# Patient Record
Sex: MALE | Race: WHITE | NOT HISPANIC OR LATINO | Employment: OTHER | URBAN - METROPOLITAN AREA
[De-identification: names, ages, dates, MRNs, and addresses within clinical notes are randomized per-mention and may not be internally consistent; named-entity substitution may affect disease eponyms.]

---

## 2018-07-02 RX ORDER — IBUPROFEN 200 MG
200 TABLET ORAL DAILY PRN
COMMUNITY
Start: 2013-03-25 | End: 2019-01-03 | Stop reason: ALTCHOICE

## 2018-07-02 RX ORDER — TAMSULOSIN HYDROCHLORIDE 0.4 MG/1
0.4 CAPSULE ORAL
COMMUNITY
Start: 2017-07-11 | End: 2019-12-20 | Stop reason: HOSPADM

## 2018-07-02 RX ORDER — FINASTERIDE 5 MG/1
TABLET, FILM COATED ORAL
COMMUNITY
Start: 2017-12-21 | End: 2020-11-05 | Stop reason: ALTCHOICE

## 2018-07-02 RX ORDER — LEVOTHYROXINE SODIUM 0.1 MG/1
TABLET ORAL
COMMUNITY
Start: 2018-04-24

## 2018-07-03 ENCOUNTER — OFFICE VISIT (OUTPATIENT)
Dept: CARDIOLOGY CLINIC | Facility: CLINIC | Age: 63
End: 2018-07-03
Payer: COMMERCIAL

## 2018-07-03 VITALS
BODY MASS INDEX: 29.32 KG/M2 | HEART RATE: 70 BPM | DIASTOLIC BLOOD PRESSURE: 80 MMHG | HEIGHT: 72 IN | WEIGHT: 216.5 LBS | SYSTOLIC BLOOD PRESSURE: 124 MMHG

## 2018-07-03 DIAGNOSIS — I44.0 FIRST DEGREE AV BLOCK: ICD-10-CM

## 2018-07-03 DIAGNOSIS — I49.3 PVC (PREMATURE VENTRICULAR CONTRACTION): Primary | ICD-10-CM

## 2018-07-03 PROCEDURE — 99245 OFF/OP CONSLTJ NEW/EST HI 55: CPT | Performed by: INTERNAL MEDICINE

## 2018-07-03 PROCEDURE — 93000 ELECTROCARDIOGRAM COMPLETE: CPT | Performed by: INTERNAL MEDICINE

## 2018-07-03 RX ORDER — ATENOLOL 25 MG/1
25 TABLET ORAL DAILY
Qty: 30 TABLET | Refills: 3 | Status: SHIPPED | OUTPATIENT
Start: 2018-07-03 | End: 2018-08-24 | Stop reason: SDUPTHER

## 2018-07-03 NOTE — PROGRESS NOTES
[facility] [address]    Outpatient New Consult Today's Date: 18        Patient name: Kelsie Servin  YOB: 1955  Sex: male         Chief Complaint: Referreal from Dr Whitney Grayson for PVC      ASSESSMENT:  Problem List Items Addressed This Visit     None      Visit Diagnoses     PVC (premature ventricular contraction)    -  Primary    Relevant Medications    atenolol (TENORMIN) 25 mg tablet    Other Relevant Orders    POCT ECG    Echo complete with contrast if indicated    Stress test only, exercise    MRI cardiac  w wo contrast    TSH, 3rd generation with T4 reflex    First degree AV block        Relevant Medications    atenolol (TENORMIN) 25 mg tablet    Other Relevant Orders    Echo complete with contrast if indicated    Stress test only, exercise    MRI cardiac  w wo contrast    TSH, 3rd generation with T4 reflex            64 yo male  1) PVC- frequent he had  20 5% of his beats (21,187) PVC, 297 couplets, 1 triplet, PVC on 12 lead is left bundle transition in V5, notched pos inf leads, pos lead I, this is from RVOT free wall  He believes he had PVC as early as 25s, but recently symptomatic for past month, has has had palpitations, feeling irregulary heart beat, dizzyness (at night going to bathroom) and  Intermittent SOB w exercise at gym  He is very fit, works out 3 times a week w intensity, and climbs mountains  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 and noted PVC's and bradycardia, first degree av block  2) First degree av block per patient this is new  3) 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, actually had 2 years of doxy, tick bite in [facility] w rash and arthritis, "foggyness" palpitatons, 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  4) Grandfather  45s, suddenly, "heart attack" after biking on camping trip    PLAN:  1   Recommend cardiac MRI r/o ARVD/Sarcoiddosis especially given FMHx of SCD, RVOT free wall is not always benign morphology pvc and can be associated w arvd, and first degree av block associated w sarcoid  2  Echo r/o cardiomypathy, assess valves (prefer this over mri in our center than MRI alone for valves and EF)  3  Exercise treadmill test see if PVC worse w/ exercise and r/o CAD (low risk for CAD given exercise tolerance, lipid profile (, HDL 58, TC <200) and  PVC location RVOT  4  TSH since on synthroid  5  emeric trial atenelol, if feels worse will stop it  If feels better continue  6  May consider PVC ablation if betablocker doesn't work  We went over risks and benefits in detail      Follow up in: 6 weeks    Orders Placed This Encounter   Procedures    MRI cardiac  w wo contrast    TSH, 3rd generation with T4 reflex    Stress test only, exercise    POCT ECG    Echo complete with contrast if indicated     There are no discontinued medications    HPI/Subjective:   64 yo male  1) PVC- frequent he had  20 5% of his beats (21,187) PVC, 297 couplets, 1 triplet, PVC on 12 lead is left bundle transition in V5, notched pos inf leads, pos lead I, this is from RVOT free wall  He believes he had PVC as early as 25s, but recently symptomatic for past month, has has had palpitations, feeling irregulary heart beat, dizzyness (at night going to bathroom) and  Intermittent SOB w exercise at gym  He is very fit, works out 3 times a week w intensity, and climbs mountains   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 and noted PVC's and bradycardia, first degree av block  2) First degree av block per patient this is new  3) 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, actually had 2 years of doxy, tick bite in [facility] w rash and arthritis, "foggyness" palpitatons, 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  4) Grandfather  45s, suddenly, "heart attack" after biking on camping trip  Please note HPI is listed by problem with with update following it, it is copied again in the assessment above and reflects medical decision making as well  Complete 12 point ROS reviewed and otherwise non pertinent or negative except as per HPI pertinent positives in Cardiovascular and Respiratory emphasized  Please see paper chart for outpatient clinic patients where the patient completed the 12 point ROS survey  No past medical history on file  Allergies   Allergen Reactions    Hydromorphone Hallucinations     Patient saw things that were not there     Medical Tape      Itches      I reviewed the Home Medication list and Allergies in the chart  Scheduled Meds:  Current Outpatient Prescriptions   Medication Sig Dispense Refill    finasteride (PROSCAR) 5 mg tablet TAKE 1 TABLET BY MOUTH DAILY      ibuprofen (MOTRIN) 200 mg tablet Take 200 mg by mouth      levothyroxine 100 mcg tablet TAKE 1 TABLET BY MOUTH DAILY      Multiple Vitamins-Minerals (MULTI COMPLETE PO) one dialy      tamsulosin (FLOMAX) 0 4 mg Take 0 4 mg by mouth      aspirin 81 MG tablet Take 81 mg by mouth      atenolol (TENORMIN) 25 mg tablet Take 1 tablet (25 mg total) by mouth daily 30 tablet 3     No current facility-administered medications for this visit  PRN Meds:  No family history on file  Social History     Social History    Marital status: Unknown     Spouse name: N/A    Number of children: N/A    Years of education: N/A     Occupational History    Not on file       Social History Main Topics    Smoking status: Not on file    Smokeless tobacco: Not on file    Alcohol use Not on file    Drug use: Unknown    Sexual activity: Not on file     Other Topics Concern    Not on file     Social History Narrative    No narrative on file         OBJECTIVE:    /80 (BP Location: Left arm, Patient Position: Sitting, Cuff Size: Large)   Pulse 70   Ht 6' (1 829 m)   Wt 98 2 kg (216 lb 8 oz)   BMI 29 36 kg/m²   Vitals:    07/03/18 0753   Weight: 98 2 kg (216 lb 8 oz)     GEN: No acute distress, Alert and oriented, well appearing  HEENT:Head, neck, ears, oral pharynx: Mucus membranes moist, oral pharynx clear, nares clear  External ears normal  EYES: Pupils equal, sclera anicteric, midline, normal conjuctiva  NECK: No JVD, supple, no obvious masses or thryomegaly or goiter  CARDIOVASCULAR:  RRR, No murmur, rub, gallops S1,S2  LUNGS: Clear To auscultation bilaterally, normal effort, no rales, rhonchi, crackles  ABDOMEN:  nondistended,  without obvious organomegaly or ascites  EXTREMITIES/VASCULAR:  No edema  Radial pulses intact, pedal pulses difficult to palpate, warm an well perfused  PSYCH: Normal Affect, no overt suicidal ideation, linear speech pattern without evidence of psychosis  NEURO: Grossly intact, moving all extremiteis equal, face symmetric, alert and responsive, no obvious focal defecits  GAIT:  Ambulates normally without difficulty  HEME: No bleeding, bruising, petechia, purpura  SKIN: No significant rashes, warm, no diaphoresis or pallor  Lab Results:       LABS:      Chemistry    No results found for: NA, K, CL, CO2, BUN, CREATININE, GLU No results found for: CALCIUM, ALKPHOS, AST, ALT, BILITOT         No results found for: CHOL  No results found for: HDL  No results found for: LDLCALC  No results found for: TRIG  No components found for: CHOLHDL    IMAGING: No results found  Cardiac testing:   No results found for this or any previous visit  No results found for this or any previous visit  No results found for this or any previous visit  No results found for this or any previous visit  I reviewed and interpreted the following LABS/EKG/TELE/IMAGING and below is summary of my interpretation (if data available):    LABS:see lipid profile in discussion    Current EKG and Rhythm Strip: NSR w frequent PVC interpolated   RVOT free wall, first degree av block  HOLTER/EVENT Monitor:Reviewes trips  monomoprhic PVC, looks outflow tract, mostly isolated,      50 min spent face to face, 80 min spent on encounter

## 2018-07-03 NOTE — LETTER
July 3, 2018     Marco Kruger MD  [address] [facility],[address], [place] [id number]    Patient: Yolanda Soares   YOB: 1955   Date of Visit: 7/3/2018       Dear Dr Francia Mcrae: Thank you for referring Brisa Peguero to me for evaluation  Below are my notes for this consultation  If you have questions, please do not hesitate to call me  I look forward to following your patient along with you  Sincerely,        Dania Braun MD        CC: No Recipients  Dania Braun MD  7/3/2018  8:42 AM  Sign at close encounter  [address] [facility]    Outpatient New Consult Today's Date: 07/03/18        Patient name: Yolanda Soares  YOB: 1955  Sex: male         Chief Complaint: Referreal from Dr Francia Mcrae for PVC      ASSESSMENT:  Problem List Items Addressed This Visit     None      Visit Diagnoses     PVC (premature ventricular contraction)    -  Primary    Relevant Medications    atenolol (TENORMIN) 25 mg tablet    Other Relevant Orders    POCT ECG    Echo complete with contrast if indicated    Stress test only, exercise    MRI cardiac  w wo contrast    TSH, 3rd generation with T4 reflex    First degree AV block        Relevant Medications    atenolol (TENORMIN) 25 mg tablet    Other Relevant Orders    Echo complete with contrast if indicated    Stress test only, exercise    MRI cardiac  w wo contrast    TSH, 3rd generation with T4 reflex            66 yo male  1) PVC- frequent he had  20 5% of his beats (21,187) PVC, 297 couplets, 1 triplet, PVC on 12 lead is left bundle transition in V5, notched pos inf leads, pos lead I, this is from RVOT free wall  He believes he had PVC as early as 25s, but recently symptomatic for past month, has has had palpitations, feeling irregulary heart beat, dizzyness (at night going to bathroom) and  Intermittent SOB w exercise at gym   He is very fit, works out 3 times a week w intensity, and climbs mountains  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 and noted PVC's and bradycardia, first degree av block  2) First degree av block per patient this is new  3) 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, actually had 2 years of doxy, tick bite in [facility] w rash and arthritis, "foggyness" palpitatons, 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  4) Grandfather  45s, suddenly, "heart attack" after biking on camping trip    PLAN:  1  Recommend cardiac MRI r/o ARVD/Sarcoiddosis especially given FMHx of SCD, RVOT free wall is not always benign morphology pvc and can be associated w arvd, and first degree av block associated w sarcoid  2  Echo r/o cardiomypathy, assess valves (prefer this over mri in our center than MRI alone for valves and EF)  3  Exercise treadmill test see if PVC worse w/ exercise and r/o CAD (low risk for CAD given exercise tolerance, lipid profile (, HDL 58, TC <200) and  PVC location RVOT  4  TSH since on synthroid  5  emeric trial atenelol, if feels worse will stop it  If feels better continue  6  May consider PVC ablation if betablocker doesn't work  We went over risks and benefits in detail      Follow up in: 6 weeks    Orders Placed This Encounter   Procedures    MRI cardiac  w wo contrast    TSH, 3rd generation with T4 reflex    Stress test only, exercise    POCT ECG    Echo complete with contrast if indicated     There are no discontinued medications    HPI/Subjective:   64 yo male  1) PVC- frequent he had  20 5% of his beats (21,187) PVC, 297 couplets, 1 triplet, PVC on 12 lead is left bundle transition in V5, notched pos inf leads, pos lead I, this is from RVOT free wall   He believes he had PVC as early as 25s, but recently symptomatic for past month, has has had palpitations, feeling irregulary heart beat, dizzyness (at night going to bathroom) and  Intermittent SOB w exercise at gym  He is very fit, works out 3 times a week w intensity, and climbs mountains  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 and noted PVC's and bradycardia, first degree av block  2) First degree av block per patient this is new  3) 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, actually had 2 years of doxy, tick bite in [facility] w rash and arthritis, "foggyness" palpitatons, 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  4) Grandfather  45s, suddenly, "heart attack" after biking on camping trip  Please note HPI is listed by problem with with update following it, it is copied again in the assessment above and reflects medical decision making as well  Complete 12 point ROS reviewed and otherwise non pertinent or negative except as per HPI pertinent positives in Cardiovascular and Respiratory emphasized  Please see paper chart for outpatient clinic patients where the patient completed the 12 point ROS survey  No past medical history on file  Allergies   Allergen Reactions    Hydromorphone Hallucinations     Patient saw things that were not there     Medical Tape      Itches      I reviewed the Home Medication list and Allergies in the chart  Scheduled Meds:  Current Outpatient Prescriptions   Medication Sig Dispense Refill    finasteride (PROSCAR) 5 mg tablet TAKE 1 TABLET BY MOUTH DAILY      ibuprofen (MOTRIN) 200 mg tablet Take 200 mg by mouth      levothyroxine 100 mcg tablet TAKE 1 TABLET BY MOUTH DAILY      Multiple Vitamins-Minerals (MULTI COMPLETE PO) one dialy      tamsulosin (FLOMAX) 0 4 mg Take 0 4 mg by mouth      aspirin 81 MG tablet Take 81 mg by mouth      atenolol (TENORMIN) 25 mg tablet Take 1 tablet (25 mg total) by mouth daily 30 tablet 3     No current facility-administered medications for this visit  PRN Meds:  No family history on file      Social History Social History    Marital status: Unknown     Spouse name: N/A    Number of children: N/A    Years of education: N/A     Occupational History    Not on file  Social History Main Topics    Smoking status: Not on file    Smokeless tobacco: Not on file    Alcohol use Not on file    Drug use: Unknown    Sexual activity: Not on file     Other Topics Concern    Not on file     Social History Narrative    No narrative on file         OBJECTIVE:    /80 (BP Location: Left arm, Patient Position: Sitting, Cuff Size: Large)   Pulse 70   Ht 6' (1 829 m)   Wt 98 2 kg (216 lb 8 oz)   BMI 29 36 kg/m²    Vitals:    07/03/18 0753   Weight: 98 2 kg (216 lb 8 oz)     GEN: No acute distress, Alert and oriented, well appearing  HEENT:Head, neck, ears, oral pharynx: Mucus membranes moist, oral pharynx clear, nares clear  External ears normal  EYES: Pupils equal, sclera anicteric, midline, normal conjuctiva  NECK: No JVD, supple, no obvious masses or thryomegaly or goiter  CARDIOVASCULAR:  RRR, No murmur, rub, gallops S1,S2  LUNGS: Clear To auscultation bilaterally, normal effort, no rales, rhonchi, crackles  ABDOMEN:  nondistended,  without obvious organomegaly or ascites  EXTREMITIES/VASCULAR:  No edema  Radial pulses intact, pedal pulses difficult to palpate, warm an well perfused  PSYCH: Normal Affect, no overt suicidal ideation, linear speech pattern without evidence of psychosis  NEURO: Grossly intact, moving all extremiteis equal, face symmetric, alert and responsive, no obvious focal defecits  GAIT:  Ambulates normally without difficulty  HEME: No bleeding, bruising, petechia, purpura  SKIN: No significant rashes, warm, no diaphoresis or pallor       Lab Results:       LABS:      Chemistry    No results found for: NA, K, CL, CO2, BUN, CREATININE, GLU No results found for: CALCIUM, ALKPHOS, AST, ALT, BILITOT         No results found for: CHOL  No results found for: HDL  No results found for: [address]  No results found for: TRIG  No components found for: CHOLHDL    IMAGING: No results found  Cardiac testing:   No results found for this or any previous visit  No results found for this or any previous visit  No results found for this or any previous visit  No results found for this or any previous visit  I reviewed and interpreted the following LABS/EKG/TELE/IMAGING and below is summary of my interpretation (if data available):    LABS:see lipid profile in discussion    Current EKG and Rhythm Strip: NSR w frequent PVC interpolated  RVOT free wall, first degree av block  HOLTER/EVENT Monitor:Reviewes trips  monomoprhic PVC, looks outflow tract, mostly isolated,      50 min spent face to face, 80 min spent on encounter

## 2018-07-17 DIAGNOSIS — I48.91 ATRIAL FIBRILLATION, UNSPECIFIED TYPE (HCC): Primary | ICD-10-CM

## 2018-07-19 LAB
BUN SERPL-MCNC: 23 MG/DL (ref 7–25)
BUN/CREAT SERPL: ABNORMAL (CALC) (ref 6–22)
CALCIUM SERPL-MCNC: 9 MG/DL (ref 8.6–10.3)
CHLORIDE SERPL-SCNC: 111 MMOL/L (ref 98–110)
CO2 SERPL-SCNC: 24 MMOL/L (ref 20–31)
CREAT SERPL-MCNC: 0.9 MG/DL (ref 0.7–1.25)
GLUCOSE SERPL-MCNC: 93 MG/DL (ref 65–99)
POTASSIUM SERPL-SCNC: 4.1 MMOL/L (ref 3.5–5.3)
SL AMB EGFR AFRICAN AMERICAN: 106 ML/MIN/1.73M2
SL AMB EGFR NON AFRICAN AMERICAN: 92 ML/MIN/1.73M2
SODIUM SERPL-SCNC: 142 MMOL/L (ref 135–146)

## 2018-07-23 ENCOUNTER — HOSPITAL ENCOUNTER (OUTPATIENT)
Dept: NON INVASIVE DIAGNOSTICS | Facility: CLINIC | Age: 63
Discharge: HOME | End: 2018-07-23
Payer: COMMERCIAL

## 2018-07-23 ENCOUNTER — HOSPITAL ENCOUNTER (OUTPATIENT)
Dept: RADIOLOGY | Facility: HOSPITAL | Age: 63
Discharge: HOME | End: 2018-07-23
Attending: INTERNAL MEDICINE
Payer: COMMERCIAL

## 2018-07-23 DIAGNOSIS — I44.0 FIRST DEGREE AV BLOCK: ICD-10-CM

## 2018-07-23 DIAGNOSIS — I49.3 PVC (PREMATURE VENTRICULAR CONTRACTION): ICD-10-CM

## 2018-07-23 LAB
CHEST PAIN STATEMENT: NORMAL
MAX DIASTOLIC BP: 79 MMHG
MAX HEART RATE: 142 BPM
MAX PREDICTED HEART RATE: 159 BPM
MAX. SYSTOLIC BP: 162 MMHG
PROTOCOL NAME: NORMAL
REASON FOR TERMINATION: NORMAL
TARGET HR FORMULA: NORMAL
TEST INDICATION: NORMAL
TIME IN EXERCISE PHASE: NORMAL

## 2018-07-23 PROCEDURE — 93306 TTE W/DOPPLER COMPLETE: CPT

## 2018-07-23 PROCEDURE — 93306 TTE W/DOPPLER COMPLETE: CPT | Performed by: INTERNAL MEDICINE

## 2018-07-23 PROCEDURE — 93018 CV STRESS TEST I&R ONLY: CPT | Performed by: INTERNAL MEDICINE

## 2018-07-23 PROCEDURE — 93017 CV STRESS TEST TRACING ONLY: CPT

## 2018-07-23 PROCEDURE — 93016 CV STRESS TEST SUPVJ ONLY: CPT | Performed by: INTERNAL MEDICINE

## 2018-07-23 PROCEDURE — 75561 CARDIAC MRI FOR MORPH W/DYE: CPT

## 2018-07-23 PROCEDURE — A9585 GADOBUTROL INJECTION: HCPCS | Performed by: INTERNAL MEDICINE

## 2018-07-23 RX ADMIN — GADOBUTROL 20 ML: 604.72 INJECTION INTRAVENOUS at 18:49

## 2018-08-13 DIAGNOSIS — R07.9 CHEST PAIN, UNSPECIFIED TYPE: Primary | ICD-10-CM

## 2018-08-13 DIAGNOSIS — R06.02 SHORTNESS OF BREATH: ICD-10-CM

## 2018-08-17 ENCOUNTER — TELEPHONE (OUTPATIENT)
Dept: CARDIOLOGY CLINIC | Facility: CLINIC | Age: 63
End: 2018-08-17

## 2018-08-17 NOTE — TELEPHONE ENCOUNTER
Pt asking the reason for the Cardiac CT if the MRI was done recently  What info will be obtained from the non-contrast CT that could not be seen on the MRI? Pt is scheduled for Monday, 8/20/18 @ 6:30 PM        Thanks much    Sam Acevedo

## 2018-08-18 NOTE — TELEPHONE ENCOUNTER
Not  A cardiac CT  Just a Chest CT, noncontrast  Looking for any signs of sarcoidosis outside the heart, such as pulmonary lymphadenopathy

## 2018-08-20 ENCOUNTER — TRANSCRIBE ORDERS (OUTPATIENT)
Dept: RADIOLOGY | Facility: HOSPITAL | Age: 63
End: 2018-08-20

## 2018-08-20 ENCOUNTER — HOSPITAL ENCOUNTER (OUTPATIENT)
Dept: RADIOLOGY | Facility: HOSPITAL | Age: 63
Discharge: HOME | End: 2018-08-20
Attending: INTERNAL MEDICINE
Payer: COMMERCIAL

## 2018-08-20 DIAGNOSIS — R07.9 CHEST PAIN, UNSPECIFIED TYPE: ICD-10-CM

## 2018-08-20 DIAGNOSIS — R06.02 SHORTNESS OF BREATH: ICD-10-CM

## 2018-08-20 PROCEDURE — 71250 CT THORAX DX C-: CPT

## 2018-08-20 NOTE — TELEPHONE ENCOUNTER
Called pt-left message with details of SS message  Pt should proceed with Chest CT  Advised pt that I am still waiting for a call back from the staff in Dr Dara Patino office in [place] to get pt in sooner

## 2018-08-24 ENCOUNTER — OFFICE VISIT (OUTPATIENT)
Dept: CARDIOLOGY CLINIC | Facility: CLINIC | Age: 63
End: 2018-08-24
Payer: COMMERCIAL

## 2018-08-24 VITALS
SYSTOLIC BLOOD PRESSURE: 96 MMHG | WEIGHT: 214 LBS | BODY MASS INDEX: 28.99 KG/M2 | HEART RATE: 67 BPM | HEIGHT: 72 IN | DIASTOLIC BLOOD PRESSURE: 60 MMHG

## 2018-08-24 DIAGNOSIS — I49.3 PVC (PREMATURE VENTRICULAR CONTRACTION): ICD-10-CM

## 2018-08-24 DIAGNOSIS — I44.0 FIRST DEGREE AV BLOCK: ICD-10-CM

## 2018-08-24 DIAGNOSIS — R07.9 CHEST PAIN, UNSPECIFIED TYPE: Primary | ICD-10-CM

## 2018-08-24 PROCEDURE — 99214 OFFICE O/P EST MOD 30 MIN: CPT | Performed by: INTERNAL MEDICINE

## 2018-08-24 PROCEDURE — 93000 ELECTROCARDIOGRAM COMPLETE: CPT | Performed by: INTERNAL MEDICINE

## 2018-08-24 RX ORDER — ATENOLOL 25 MG/1
25 TABLET ORAL DAILY
Qty: 90 TABLET | Refills: 3 | Status: SHIPPED | OUTPATIENT
Start: 2018-08-24 | End: 2018-10-24

## 2018-08-24 RX ORDER — RIBOFLAVIN (VITAMIN B2) 100 MG
100 TABLET ORAL DAILY
COMMUNITY

## 2018-08-24 RX ORDER — UBIDECARENONE 75 MG
750 CAPSULE ORAL DAILY
COMMUNITY

## 2018-08-24 RX ORDER — MELATONIN
1000 DAILY
COMMUNITY

## 2018-08-24 RX ORDER — VITAMIN B COMPLEX
1 CAPSULE ORAL DAILY
COMMUNITY

## 2018-08-24 NOTE — PROGRESS NOTES
HEART  [facility]    Outpatient follow upToday's Date: 18        Patient name: Brunilda Srivastava  YOB: 1955  Sex: male         Chief Complaint: f/u pvc    ASSESSMENT:  Problem List Items Addressed This Visit     None      Visit Diagnoses     Chest pain, unspecified type    -  Primary    Relevant Orders    POCT ECG    Angiotensin converting enzyme    TSH, 3rd generation with T4 reflex    Comprehensive metabolic panel    CBC and differential    PVC (premature ventricular contraction)        Relevant Medications    atenolol (TENORMIN) 25 mg tablet    First degree AV block        Relevant Medications    atenolol (TENORMIN) 25 mg tablet            66 yo male  1) PVC- frequent he had  20 5% of his beats (21,187) PVC, 297 couplets, 1 triplet, PVC on 12 lead is left bundle transition in V5, notched pos inf leads, pos lead I, this is from RVOT free wall  He believes he had PVC as early as 25s, but recently symptomatic for past month, has has had palpitations, feeling irregulary heart beat, dizzyness (at night going to bathroom) and  Intermittent SOB w exercise at gym  He is very fit, works out 3 times a week w intensity, and climbs mountains  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 and noted PVC's and bradycardia, first degree av block  STress test and echo were normal but Cardiac MRI did show mild RV enlargement and reduced RV funciton and 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, which correlates to region PVC is coming from  He has atypical CP at rest which likely correlates to PVC, improves w exercise     2) First degree av block per patient this is new  3) 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, actually had 2 years of doxy, tick bite in [facility] w rash and arthritis, "foggyness" palpitatons, 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  4) Grandfather  45s, suddenly, "heart attack" after biking on camping trip    PLAN:  1  Referred to Rheumatology at [facility]  Hopefully they will further w/u w Cardiac FDG PET CT which we do not have access to here  2  Check ACE level  TSH, CBC, CMP  3  Continue atenelol 25mg can try taking extra half as needed w limited w low BP and bradycardia  Also try taking supplemental Magnesium  4  CT chest did not show lymphadenopathy   5  Recommended against mountain climbing for now  HE may continue exercise in  Gym or w partner as long as access to health care  Follow up after sees Rheumatology at [facility]  Orders Placed This Encounter   Procedures    Angiotensin converting enzyme    TSH, 3rd generation with T4 reflex    Comprehensive metabolic panel    CBC and differential    POCT ECG     Medications Discontinued During This Encounter   Medication Reason    atenolol (TENORMIN) 25 mg tablet Reorder             HPI/Subjective:   64 yo male  1) PVC- frequent he had  20 5% of his beats (21,187) PVC, 297 couplets, 1 triplet, PVC on 12 lead is left bundle transition in V5, notched pos inf leads, pos lead I, this is from RVOT free wall  He believes he had PVC as early as 26s, but recently symptomatic for past month, has has had palpitations, feeling irregulary heart beat, dizzyness (at night going to bathroom) and  Intermittent SOB w exercise at gym  He is very fit, works out 3 times a week w intensity, and climbs mountains  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 and noted PVC's and bradycardia, first degree av block  STress test and echo were normal but Cardiac MRI did show mild RV enlargement and reduced RV funciton and 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, which correlates to region PVC is coming from  He has atypical CP at rest which likely correlates to PVC, improves w exercise     2) First degree av block per patient this is new  3) 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, actually had 2 years of doxy, tick bite in [place] w rash and arthritis, "foggyness" palpitatons, 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  4) Grandfather  45s, suddenly, "heart attack" after biking on camping trip    Complete 12 point ROS reviewed and otherwise non pertinent or negative except as per HPI pertinent positives in Cardiovascular and Respiratory emphasized  Please see paper chart for outpatient clinic patients where the patient completed the 12 point ROS survey  No past medical history on file  Allergies   Allergen Reactions    Hydromorphone Hallucinations     Patient saw things that were not there     Medical Tape      Itches      I reviewed the Home Medication list and Allergies in the chart  Scheduled Meds:  Current Outpatient Prescriptions   Medication Sig Dispense Refill    Ascorbic Acid (VITAMIN C) 100 MG tablet Take 100 mg by mouth daily      aspirin 81 MG tablet Take 81 mg by mouth      atenolol (TENORMIN) 25 mg tablet Take 1 tablet (25 mg total) by mouth daily 90 tablet 3    b complex vitamins capsule Take 1 capsule by mouth daily      cholecalciferol (VITAMIN D3) 1,000 units tablet Take 1,000 Units by mouth daily      cyanocobalamin (VITAMIN B-12) 100 mcg tablet Take by mouth daily      finasteride (PROSCAR) 5 mg tablet TAKE 1 TABLET BY MOUTH DAILY      ibuprofen (MOTRIN) 200 mg tablet Take 200 mg by mouth      levothyroxine 100 mcg tablet TAKE 1 TABLET BY MOUTH DAILY      Multiple Vitamins-Minerals (MULTI COMPLETE PO) one dialy      tamsulosin (FLOMAX) 0 4 mg Take 0 4 mg by mouth daily with dinner        vitamin A 10,000 units capsule Take 10,000 Units by mouth daily      vitamin E 100 UNIT capsule Take 100 Units by mouth daily       No current facility-administered medications for this visit  PRN Meds:  No family history on file      Social History     Social History    Marital status: /Civil [employer]     Spouse name: N/A    Number of children: N/A    Years of education: N/A     Occupational History    Not on file  Social History Main Topics    Smoking status: Never Smoker    Smokeless tobacco: Never Used    Alcohol use Not on file    Drug use: Unknown    Sexual activity: Not on file     Other Topics Concern    Not on file     Social History Narrative    No narrative on file         OBJECTIVE:    BP 96/60 (BP Location: Right arm, Patient Position: Sitting, Cuff Size: Standard)   Pulse 67   Ht 6' (1 829 m)   Wt 97 1 kg (214 lb)   BMI 29 02 kg/m²   Vitals:    08/24/18 0901   Weight: 97 1 kg (214 lb)     GEN: No acute distress, Alert and oriented, well appearing  HEENT:Head, neck, ears, oral pharynx: Mucus membranes moist, oral pharynx clear, nares clear  External ears normal  EYES: Pupils equal, sclera anicteric, midline, normal conjuctiva  NECK: No JVD, supple, no obvious masses or thryomegaly or goiter  CARDIOVASCULAR:  RRR, No murmur, rub, gallops S1,S2  LUNGS: Clear To auscultation bilaterally, normal effort, no rales, rhonchi, crackles  ABDOMEN:  nondistended,  without obvious organomegaly or ascites  EXTREMITIES/VASCULAR:  No edema  Radial pulses intact, pedal pulses difficult to palpate, warm an well perfused  PSYCH: Normal Affect, no overt suicidal ideation, linear speech pattern without evidence of psychosis  NEURO: Grossly intact, moving all extremiteis equal, face symmetric, alert and responsive, no obvious focal defecits  GAIT:  Ambulates normally without difficulty  HEME: No bleeding, bruising, petechia, purpura  SKIN: No significant rashes, warm, no diaphoresis or pallor       Lab Results:       LABS:      Chemistry        Component Value Date/Time    BUN 23 07/18/2018 0928    CREATININE 0 90 07/18/2018 0928        Component Value Date/Time    CALCIUM 9 0 07/18/2018 0928            No results found for: CHOL  No results found for: HDL  No results found for: [facility]  No results found for: TRIG  No components found for: CHOLHDL    IMAGING: No results found  I reviewed and interpreted the following LABS/EKG/TELE/IMAGING and below is summary of my interpretation (if data available):    LABS:see lipid profile in discussion    Current EKG and Rhythm Strip: NSR w frequent PVC interpolated  RVOT free wall, first degree av block unchanged from prior but PVC does looks more notched and fractionated  50 min spent face to face, 80 min spent on encounter

## 2018-08-28 NOTE — TELEPHONE ENCOUNTER
I called Dr Felipa Hoyt office to see if any sooner appointments than 11/20/18; nothing sooner-they are booking into January

## 2018-09-04 LAB
ACE SERPL-CCNC: 67 U/L (ref 9–67)
ALBUMIN SERPL-MCNC: 4.1 G/DL (ref 3.6–5.1)
ALBUMIN/GLOB SERPL: 1.5 (CALC) (ref 1–2.5)
ALP SERPL-CCNC: 75 U/L (ref 40–115)
ALT SERPL-CCNC: 18 U/L (ref 9–46)
AST SERPL-CCNC: 22 U/L (ref 10–35)
BASOPHILS # BLD AUTO: 60 CELLS/UL (ref 0–200)
BASOPHILS NFR BLD AUTO: 1 %
BILIRUB SERPL-MCNC: 0.9 MG/DL (ref 0.2–1.2)
BUN SERPL-MCNC: 18 MG/DL (ref 7–25)
BUN/CREAT SERPL: NORMAL (CALC) (ref 6–22)
CALCIUM SERPL-MCNC: 9.4 MG/DL (ref 8.6–10.3)
CHLORIDE SERPL-SCNC: 108 MMOL/L (ref 98–110)
CO2 SERPL-SCNC: 25 MMOL/L (ref 20–32)
CREAT SERPL-MCNC: 0.9 MG/DL (ref 0.7–1.25)
EOSINOPHIL # BLD AUTO: 582 CELLS/UL (ref 15–500)
EOSINOPHIL NFR BLD AUTO: 9.7 %
ERYTHROCYTE [DISTWIDTH] IN BLOOD BY AUTOMATED COUNT: 11.8 % (ref 11–15)
GLOBULIN SER CALC-MCNC: 2.8 G/DL (CALC) (ref 1.9–3.7)
GLUCOSE SERPL-MCNC: 90 MG/DL (ref 65–99)
HCT VFR BLD AUTO: 45.6 % (ref 38.5–50)
HGB BLD-MCNC: 15.3 G/DL (ref 13.2–17.1)
LYMPHOCYTES # BLD AUTO: 1764 CELLS/UL (ref 850–3900)
LYMPHOCYTES NFR BLD AUTO: 29.4 %
MCH RBC QN AUTO: 31.9 PG (ref 27–33)
MCHC RBC AUTO-ENTMCNC: 33.6 G/DL (ref 32–36)
MCV RBC AUTO: 95.2 FL (ref 80–100)
MONOCYTES # BLD AUTO: 384 CELLS/UL (ref 200–950)
MONOCYTES NFR BLD AUTO: 6.4 %
NEUTROPHILS # BLD AUTO: 3210 CELLS/UL (ref 1500–7800)
NEUTROPHILS NFR BLD AUTO: 53.5 %
PLATELET # BLD AUTO: 253 THOUSAND/UL (ref 140–400)
PMV BLD REES-ECKER: 9.4 FL (ref 7.5–12.5)
POTASSIUM SERPL-SCNC: 4.6 MMOL/L (ref 3.5–5.3)
PROT SERPL-MCNC: 6.9 G/DL (ref 6.1–8.1)
RBC # BLD AUTO: 4.79 MILLION/UL (ref 4.2–5.8)
SL AMB EGFR AFRICAN AMERICAN: 106 ML/MIN/1.73M2
SL AMB EGFR NON AFRICAN AMERICAN: 92 ML/MIN/1.73M2
SODIUM SERPL-SCNC: 141 MMOL/L (ref 135–146)
TSH SERPL-ACNC: 0.7 MIU/L (ref 0.4–4.5)
WBC # BLD AUTO: 6 THOUSAND/UL (ref 3.8–10.8)

## 2018-09-13 ENCOUNTER — TELEPHONE (OUTPATIENT)
Dept: CARDIOLOGY CLINIC | Facility: CLINIC | Age: 63
End: 2018-09-13

## 2018-09-13 NOTE — TELEPHONE ENCOUNTER
Pt called-asking if OK to have colonoscopy from cardiac standpoint  OK per Dr Alvina Koehler  Pt notified

## 2018-10-22 ENCOUNTER — TELEPHONE (OUTPATIENT)
Dept: CARDIOLOGY CLINIC | Facility: CLINIC | Age: 63
End: 2018-10-22

## 2018-10-22 NOTE — TELEPHONE ENCOUNTER
Lets get him in to see me Wednesday  I see my last appt is 3:20 so I can see him after that  I called him and left a message and told him to hold atenelol for now        Thanks  Arti Sow

## 2018-10-22 NOTE — TELEPHONE ENCOUNTER
P/C from pt-report last Thursday, 10/18/18 got up out of bed to turn off his fan, and says he passed out  All day, then, felt LH all day  Pt decreased his atenolol to every other day  Still not feeling well  Pt has his OV with Dr Collins Casillas at [address] [place] [address] on November 20,2018   (i called there-no sooner cancellations)  Pt does not have any BP or HR readings to provide

## 2018-10-23 NOTE — TELEPHONE ENCOUNTER
Pt is scheduled for Weds at 3:40  Advised pt to call, in the interim, if any problems  Pt understands

## 2018-10-24 ENCOUNTER — OFFICE VISIT (OUTPATIENT)
Dept: CARDIOLOGY CLINIC | Facility: CLINIC | Age: 63
End: 2018-10-24
Payer: COMMERCIAL

## 2018-10-24 VITALS
BODY MASS INDEX: 29.11 KG/M2 | DIASTOLIC BLOOD PRESSURE: 78 MMHG | SYSTOLIC BLOOD PRESSURE: 136 MMHG | HEART RATE: 73 BPM | WEIGHT: 214.9 LBS | HEIGHT: 72 IN

## 2018-10-24 DIAGNOSIS — I49.3 FREQUENT PVCS: ICD-10-CM

## 2018-10-24 DIAGNOSIS — R55 SYNCOPE, UNSPECIFIED SYNCOPE TYPE: Primary | ICD-10-CM

## 2018-10-24 PROCEDURE — 99214 OFFICE O/P EST MOD 30 MIN: CPT | Performed by: INTERNAL MEDICINE

## 2018-10-24 PROCEDURE — 93000 ELECTROCARDIOGRAM COMPLETE: CPT | Performed by: INTERNAL MEDICINE

## 2018-10-24 RX ORDER — FLECAINIDE ACETATE 50 MG/1
50 TABLET ORAL 2 TIMES DAILY
Qty: 60 TABLET | Refills: 3 | Status: SHIPPED | OUTPATIENT
Start: 2018-10-24 | End: 2018-12-20 | Stop reason: HOSPADM

## 2018-10-24 NOTE — PROGRESS NOTES
HEART  [facility] [place] [facility]    Outpatient follow upToday's Date: 10/24/18        Patient name: Lila Howell  YOB: 1955  Sex: male         Chief Complaint: f/u pvc    ASSESSMENT:  Problem List Items Addressed This Visit     None      Visit Diagnoses     Syncope, unspecified syncope type    -  Primary    Relevant Orders    POCT ECG    MRI brain w wo contrast    Frequent PVCs        Relevant Medications    flecainide (TAMBOCOR) 50 mg tablet            64 yo male  1) PVC- frequent he had  20 5% of his beats (21,187) PVC, 297 couplets, 1 triplet, PVC on 12 lead is left bundle transition in V5, notched pos inf leads, pos lead I, this is from RVOT free wall  He believes he had PVC as early as 26s, but recently symptomatic for past month, has has had palpitations, feeling irregulary heart beat, dizzyness (at night going to bathroom) and  Intermittent SOB w exercise at gym  He is very fit, works out 3 times a week w intensity, and climbs mountains  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 and noted PVC's and bradycardia, first degree av block  STress test and echo were normal but Cardiac MRI did show mild RV enlargement and reduced RV funciton and 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 w rash and arthritis, "foggyness" palpitatons, 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  4) Grandfather  45s, suddenly, "heart attack" after biking on camping trip    ACE level bordeline elevated at 67  PLAN:  1  Referred to Rheumatology at [facility]  Hopefully they will further w/u w Cardiac FDG PET CT which we do not have access to here  He has appt 18    2  Try Flecainide 50mg bid for symptomatic PVC's  Maybe better tolerated than atenelol    3  Brain MRI r/o cerebral sarcoid given CNS symptoms, syncope, memory issues, head ache      Follow up after sees Rheumatology at [facility]  Orders Placed This Encounter   Procedures    MRI brain w wo contrast    POCT ECG     Medications Discontinued During This Encounter   Medication Reason    Multiple Vitamins-Minerals (MULTI COMPLETE PO)     vitamin A 10,000 units capsule     atenolol (TENORMIN) 25 mg tablet              HPI/Subjective:     66 yo male  1) PVC- frequent he had  20 5% of his beats (21,187) PVC, 297 couplets, 1 triplet, PVC on 12 lead is left bundle transition in V5, notched pos inf leads, pos lead I, this is from RVOT free wall  He believes he had PVC as early as 26s, but recently symptomatic for past month, has has had palpitations, feeling irregulary heart beat, dizzyness (at night going to bathroom) and  Intermittent SOB w exercise at gym  He is very fit, works out 3 times a week w intensity, and climbs mountains  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 and noted PVC's and bradycardia, first degree av block  STress test and echo were normal but Cardiac MRI did show mild RV enlargement and reduced RV funciton and 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, which correlates to region PVC is coming from  He has atypical CP at rest which likely correlates to PVC, improves w exercise  Feels very tired and has bradycardia on atenelol   3 days ago getting out of bed to adjust the fan he passed out, unclear what happened, no real prodrome  He and the fan on the floor  Since the had strange sensation on top of his head, feels like he is falling  We weaned atenelol and mildly improved  2) First degree av block per patient this is new  3) 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, actually had 2 years of doxy, tick bite in [place] w rash and arthritis, "foggyness" palpitatons, 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  4) Grandfather  45s, suddenly, "heart attack" after biking on camping trip    ACE level bordeline elevated at 79  Complete 12 point ROS reviewed and otherwise non pertinent or negative except as per HPI pertinent positives in Cardiovascular and Respiratory emphasized  Please see paper chart for outpatient clinic patients where the patient completed the 12 point ROS survey  No past medical history on file  Allergies   Allergen Reactions    Hydromorphone Hallucinations     Patient saw things that were not there     Medical Tape      Itches      I reviewed the Home Medication list and Allergies in the chart     Scheduled Meds:  Current Outpatient Prescriptions   Medication Sig Dispense Refill    Ascorbic Acid (VITAMIN C) 100 MG tablet Take 100 mg by mouth daily      aspirin 81 MG tablet Take 81 mg by mouth      b complex vitamins capsule Take 1 capsule by mouth daily      cholecalciferol (VITAMIN D3) 1,000 units tablet Take 1,000 Units by mouth daily      cyanocobalamin (VITAMIN B-12) 100 mcg tablet Take 750 mcg by mouth daily        finasteride (PROSCAR) 5 mg tablet TAKE 1 TABLET BY MOUTH DAILY      FOLIC ACID PO Take 1 tablet by mouth Every 3-4 days      ibuprofen (MOTRIN) 200 mg tablet Take 200 mg by mouth daily as needed        levothyroxine 100 mcg tablet TAKE 1 TABLET BY MOUTH DAILY      MAGNESIUM PO Take 1 tablet by mouth daily      tamsulosin (FLOMAX) 0 4 mg Take 0 4 mg by mouth daily with dinner        vitamin E 100 UNIT capsule Take 100 Units by mouth daily      flecainide (TAMBOCOR) 50 mg tablet Take 1 tablet (50 mg total) by mouth 2 (two) times a day 60 tablet 3     No current facility-administered medications for this visit  PRN Meds:  No family history on file  Social History     Social History    Marital status: /Civil Union     Spouse name: N/A    Number of children: N/A    Years of education: N/A     Occupational History    Not on file  Social History Main Topics    Smoking status: Never Smoker    Smokeless tobacco: Never Used    Alcohol use Not on file    Drug use: Unknown    Sexual activity: Not on file     Other Topics Concern    Not on file     Social History Narrative    No narrative on file         OBJECTIVE:    /78 (BP Location: Right arm, Patient Position: Sitting, Cuff Size: Large)   Pulse 73   Ht 6' (1 829 m)   Wt 97 5 kg (214 lb 14 4 oz)   BMI 29 15 kg/m²   Vitals:    10/24/18 1556   Weight: 97 5 kg (214 lb 14 4 oz)     GEN: No acute distress, Alert and oriented, well appearing  HEENT:Head, neck, ears, oral pharynx: Mucus membranes moist, oral pharynx clear, nares clear  External ears normal  EYES: Pupils equal, sclera anicteric, midline, normal conjuctiva  NECK: No JVD, supple, no obvious masses or thryomegaly or goiter  CARDIOVASCULAR:  RRR, No murmur, rub, gallops S1,S2  LUNGS: Clear To auscultation bilaterally, normal effort, no rales, rhonchi, crackles  ABDOMEN:  nondistended,  without obvious organomegaly or ascites  EXTREMITIES/VASCULAR:  No edema  Radial pulses intact, pedal pulses difficult to palpate, warm an well perfused  PSYCH: Normal Affect, no overt suicidal ideation, linear speech pattern without evidence of psychosis     NEURO: Grossly intact, moving all extremiteis equal, face symmetric, alert and responsive, no obvious focal defecits  GAIT:  Ambulates normally without difficulty  HEME: No bleeding, bruising, petechia, purpura  SKIN: No significant rashes, warm, no diaphoresis or pallor  Lab Results:       LABS:      Chemistry        Component Value Date/Time    K 4 6 08/31/2018 0912     08/31/2018 0912    CO2 25 08/31/2018 0912    BUN 18 08/31/2018 0912        Component Value Date/Time    CALCIUM 9 4 08/31/2018 0912    ALKPHOS 75 08/31/2018 0912            No results found for: CHOL  No results found for: HDL  No results found for: LDLCALC  No results found for: TRIG  No components found for: CHOLHDL            I reviewed and interpreted the following LABS/EKG/TELE/IMAGING and below is summary of my interpretation (if data available):    LABS:see lipid profile in discussion    Current EKG and Rhythm Strip: NSR w frequent PVC interpolated  RVOT free wall, first degree av block unchanged from prior but PVC does looks more notched and fractionated  50 min spent face to face, 80 min spent on encounter  ADDENDUM ON 12/13/18: Patient seen at [address] [facility] office with Sarcoidosis specialist Dr Afia Solitario who 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

## 2018-10-26 DIAGNOSIS — I49.3 PVC (PREMATURE VENTRICULAR CONTRACTION): Primary | ICD-10-CM

## 2018-10-29 LAB
BUN SERPL-MCNC: 21 MG/DL (ref 7–25)
BUN/CREAT SERPL: NORMAL (CALC) (ref 6–22)
CALCIUM SERPL-MCNC: 9.6 MG/DL (ref 8.6–10.3)
CHLORIDE SERPL-SCNC: 106 MMOL/L (ref 98–110)
CO2 SERPL-SCNC: 29 MMOL/L (ref 20–32)
CREAT SERPL-MCNC: 0.97 MG/DL (ref 0.7–1.25)
GLUCOSE SERPL-MCNC: 83 MG/DL (ref 65–139)
POTASSIUM SERPL-SCNC: 4.6 MMOL/L (ref 3.5–5.3)
SL AMB EGFR AFRICAN AMERICAN: 97 ML/MIN/1.73M2
SL AMB EGFR NON AFRICAN AMERICAN: 84 ML/MIN/1.73M2
SODIUM SERPL-SCNC: 141 MMOL/L (ref 135–146)

## 2018-11-06 ENCOUNTER — HOSPITAL ENCOUNTER (OUTPATIENT)
Dept: MRI IMAGING | Facility: HOSPITAL | Age: 63
Discharge: HOME | End: 2018-11-06
Payer: COMMERCIAL

## 2018-11-06 DIAGNOSIS — R55 SYNCOPE, UNSPECIFIED SYNCOPE TYPE: ICD-10-CM

## 2018-11-06 PROCEDURE — 70553 MRI BRAIN STEM W/O & W/DYE: CPT

## 2018-11-06 PROCEDURE — A9585 GADOBUTROL INJECTION: HCPCS | Performed by: INTERNAL MEDICINE

## 2018-11-06 RX ADMIN — GADOBUTROL 10 ML: 604.72 INJECTION INTRAVENOUS at 20:46

## 2018-11-28 ENCOUNTER — TELEPHONE (OUTPATIENT)
Dept: CARDIOLOGY CLINIC | Facility: CLINIC | Age: 63
End: 2018-11-28

## 2018-11-28 NOTE — TELEPHONE ENCOUNTER
Pt called-had consultation with Dr Celsa Cook and had bloodwork done  Pt is asking what the next step is  He wasn't sure if he was to F/U here, or down there  I will call for the correspondence from the office visit

## 2018-12-06 NOTE — TELEPHONE ENCOUNTER
Pt called back to give update-pt was at urologist recently and said the staff had a difficult time getting his HR  They used an auto machine and says his HR was 39-concerned that this was not correct  Pt felt fine, but on some occasions, he has LH - pt decreased his flecainide 50 mg to once daily-felt as if he may pass out  Also, his PET Scan is scheduled for Friday, 12/14/18 @ [facility], had hand x-ray today, will have F/U with Dr Martin Reina on 1/14/19  Should he come in for EKG to check HR? Also, did you get a chance to review the records from Dr Sharlet Aase office? Thanks

## 2018-12-07 NOTE — TELEPHONE ENCOUNTER
Spoke w him  Will stop flecainide all together  If he continues to feel bad should come in for EKG  Likely plan is ablation and ICD for him for cardiac sarcoid if the PET scan confirms this

## 2018-12-17 ENCOUNTER — TELEPHONE (OUTPATIENT)
Dept: NON INVASIVE DIAGNOSTICS | Facility: HOSPITAL | Age: 63
End: 2018-12-17

## 2018-12-17 ENCOUNTER — TELEPHONE (OUTPATIENT)
Dept: CARDIOLOGY CLINIC | Facility: CLINIC | Age: 63
End: 2018-12-17

## 2018-12-17 NOTE — TELEPHONE ENCOUNTER
Discussed PET results w  Usha Dolan  He has FDG update in [place] Lymphnodes suggestive of sarcoidosis  D/w Dr Nettie Gottron who is referring him to Pulmonary for biopsy of this  There is no active inflammation in the heart, but cardiac MRI did show scar which maybe from prior sarcoidosis  Plan  1) proceed w PVC ablation  2) EP Study and if inducible for VT implant dual chamber ICD at that time  If EP study is negative will await for biopsy results and if confirms sarcoid then will implant ICD at that time  D/w Mr Usha Dolan who agrees to proceed

## 2018-12-19 ENCOUNTER — TELEPHONE (OUTPATIENT)
Dept: INPATIENT UNIT | Facility: HOSPITAL | Age: 63
End: 2018-12-19

## 2018-12-19 RX ORDER — CEFAZOLIN SODIUM 2 G/50ML
2000 SOLUTION INTRAVENOUS ONCE
Status: CANCELLED | OUTPATIENT
Start: 2018-12-19 | End: 2018-12-19

## 2018-12-20 ENCOUNTER — ANESTHESIA (OUTPATIENT)
Dept: SURGERY | Facility: HOSPITAL | Age: 63
End: 2018-12-20
Payer: COMMERCIAL

## 2018-12-20 ENCOUNTER — HOSPITAL ENCOUNTER (OUTPATIENT)
Dept: NON INVASIVE DIAGNOSTICS | Facility: HOSPITAL | Age: 63
Discharge: HOME | End: 2018-12-20
Attending: INTERNAL MEDICINE | Admitting: INTERNAL MEDICINE
Payer: COMMERCIAL

## 2018-12-20 ENCOUNTER — ANESTHESIA EVENT (OUTPATIENT)
Dept: SURGERY | Facility: HOSPITAL | Age: 63
End: 2018-12-20
Payer: COMMERCIAL

## 2018-12-20 VITALS
HEIGHT: 72 IN | RESPIRATION RATE: 18 BRPM | BODY MASS INDEX: 28.85 KG/M2 | HEART RATE: 70 BPM | WEIGHT: 213 LBS | SYSTOLIC BLOOD PRESSURE: 121 MMHG | DIASTOLIC BLOOD PRESSURE: 58 MMHG | TEMPERATURE: 98.2 F | OXYGEN SATURATION: 95 %

## 2018-12-20 DIAGNOSIS — I49.3 VENTRICULAR PREMATURE BEATS: ICD-10-CM

## 2018-12-20 LAB
ANION GAP SERPL CALCULATED.3IONS-SCNC: 1 MMOL/L (ref 4–13)
ATRIAL RATE: 65 BPM
ATRIAL RATE: 66 BPM
BASOPHILS # BLD AUTO: 0.06 THOUSANDS/ΜL (ref 0–0.1)
BASOPHILS NFR BLD AUTO: 1 % (ref 0–1)
BUN SERPL-MCNC: 20 MG/DL (ref 5–25)
CALCIUM SERPL-MCNC: 8.9 MG/DL (ref 8.3–10.1)
CHLORIDE SERPL-SCNC: 111 MMOL/L (ref 100–108)
CO2 SERPL-SCNC: 29 MMOL/L (ref 21–32)
CREAT SERPL-MCNC: 1.01 MG/DL (ref 0.6–1.3)
EOSINOPHIL # BLD AUTO: 0.46 THOUSAND/ΜL (ref 0–0.61)
EOSINOPHIL NFR BLD AUTO: 9 % (ref 0–6)
ERYTHROCYTE [DISTWIDTH] IN BLOOD BY AUTOMATED COUNT: 12 % (ref 11.6–15.1)
GFR SERPL CREATININE-BSD FRML MDRD: 79 ML/MIN/1.73SQ M
GLUCOSE P FAST SERPL-MCNC: 93 MG/DL (ref 65–99)
GLUCOSE SERPL-MCNC: 87 MG/DL (ref 65–140)
GLUCOSE SERPL-MCNC: 93 MG/DL (ref 65–140)
HCT VFR BLD AUTO: 43.8 % (ref 36.5–49.3)
HGB BLD-MCNC: 14.3 G/DL (ref 12–17)
IMM GRANULOCYTES # BLD AUTO: 0.02 THOUSAND/UL (ref 0–0.2)
IMM GRANULOCYTES NFR BLD AUTO: 0 % (ref 0–2)
INR PPP: 0.92 (ref 0.86–1.17)
LYMPHOCYTES # BLD AUTO: 1.53 THOUSANDS/ΜL (ref 0.6–4.47)
LYMPHOCYTES NFR BLD AUTO: 30 % (ref 14–44)
MAGNESIUM SERPL-MCNC: 2.5 MG/DL (ref 1.6–2.6)
MCH RBC QN AUTO: 31.4 PG (ref 26.8–34.3)
MCHC RBC AUTO-ENTMCNC: 32.6 G/DL (ref 31.4–37.4)
MCV RBC AUTO: 96 FL (ref 82–98)
MONOCYTES # BLD AUTO: 0.34 THOUSAND/ΜL (ref 0.17–1.22)
MONOCYTES NFR BLD AUTO: 7 % (ref 4–12)
NEUTROPHILS # BLD AUTO: 2.71 THOUSANDS/ΜL (ref 1.85–7.62)
NEUTS SEG NFR BLD AUTO: 53 % (ref 43–75)
NRBC BLD AUTO-RTO: 0 /100 WBCS
P AXIS: 70 DEGREES
P AXIS: 81 DEGREES
PLATELET # BLD AUTO: 225 THOUSANDS/UL (ref 149–390)
PMV BLD AUTO: 9.4 FL (ref 8.9–12.7)
POTASSIUM SERPL-SCNC: 4.9 MMOL/L (ref 3.5–5.3)
PR INTERVAL: 212 MS
PR INTERVAL: 218 MS
PROTHROMBIN TIME: 12.5 SECONDS (ref 11.8–14.2)
QRS AXIS: 36 DEGREES
QRS AXIS: 50 DEGREES
QRSD INTERVAL: 92 MS
QRSD INTERVAL: 94 MS
QT INTERVAL: 402 MS
QT INTERVAL: 440 MS
QTC INTERVAL: 418 MS
QTC INTERVAL: 461 MS
RBC # BLD AUTO: 4.56 MILLION/UL (ref 3.88–5.62)
SODIUM SERPL-SCNC: 141 MMOL/L (ref 136–145)
T WAVE AXIS: 51 DEGREES
T WAVE AXIS: 70 DEGREES
VENTRICULAR RATE: 65 BPM
VENTRICULAR RATE: 66 BPM
WBC # BLD AUTO: 5.12 THOUSAND/UL (ref 4.31–10.16)

## 2018-12-20 PROCEDURE — C1894 INTRO/SHEATH, NON-LASER: HCPCS | Performed by: INTERNAL MEDICINE

## 2018-12-20 PROCEDURE — 83735 ASSAY OF MAGNESIUM: CPT | Performed by: PHYSICIAN ASSISTANT

## 2018-12-20 PROCEDURE — 82948 REAGENT STRIP/BLOOD GLUCOSE: CPT

## 2018-12-20 PROCEDURE — 93654 COMPRE EP EVAL TX VT: CPT | Performed by: INTERNAL MEDICINE

## 2018-12-20 PROCEDURE — 85610 PROTHROMBIN TIME: CPT | Performed by: PHYSICIAN ASSISTANT

## 2018-12-20 PROCEDURE — 93005 ELECTROCARDIOGRAM TRACING: CPT

## 2018-12-20 PROCEDURE — 85025 COMPLETE CBC W/AUTO DIFF WBC: CPT | Performed by: PHYSICIAN ASSISTANT

## 2018-12-20 PROCEDURE — 93621 COMP EP EVL L PAC&REC C SINS: CPT | Performed by: INTERNAL MEDICINE

## 2018-12-20 PROCEDURE — C1730 CATH, EP, 19 OR FEW ELECT: HCPCS | Performed by: INTERNAL MEDICINE

## 2018-12-20 PROCEDURE — C1732 CATH, EP, DIAG/ABL, 3D/VECT: HCPCS | Performed by: INTERNAL MEDICINE

## 2018-12-20 PROCEDURE — C1893 INTRO/SHEATH, FIXED,NON-PEEL: HCPCS | Performed by: INTERNAL MEDICINE

## 2018-12-20 PROCEDURE — 93623 PRGRMD STIMJ&PACG IV RX NFS: CPT | Performed by: INTERNAL MEDICINE

## 2018-12-20 PROCEDURE — 93010 ELECTROCARDIOGRAM REPORT: CPT | Performed by: INTERNAL MEDICINE

## 2018-12-20 PROCEDURE — 80048 BASIC METABOLIC PNL TOTAL CA: CPT | Performed by: PHYSICIAN ASSISTANT

## 2018-12-20 RX ORDER — PROPOFOL 10 MG/ML
INJECTION, EMULSION INTRAVENOUS CONTINUOUS PRN
Status: DISCONTINUED | OUTPATIENT
Start: 2018-12-20 | End: 2018-12-20 | Stop reason: SURG

## 2018-12-20 RX ORDER — CEFAZOLIN SODIUM 2 G/50ML
2000 SOLUTION INTRAVENOUS ONCE
Status: DISCONTINUED | OUTPATIENT
Start: 2018-12-20 | End: 2018-12-20 | Stop reason: HOSPADM

## 2018-12-20 RX ORDER — FENTANYL CITRATE 50 UG/ML
INJECTION, SOLUTION INTRAMUSCULAR; INTRAVENOUS AS NEEDED
Status: DISCONTINUED | OUTPATIENT
Start: 2018-12-20 | End: 2018-12-20 | Stop reason: SURG

## 2018-12-20 RX ORDER — MIDAZOLAM HYDROCHLORIDE 1 MG/ML
INJECTION INTRAMUSCULAR; INTRAVENOUS AS NEEDED
Status: DISCONTINUED | OUTPATIENT
Start: 2018-12-20 | End: 2018-12-20 | Stop reason: SURG

## 2018-12-20 RX ORDER — LIDOCAINE HYDROCHLORIDE 10 MG/ML
INJECTION, SOLUTION INFILTRATION; PERINEURAL CODE/TRAUMA/SEDATION MEDICATION
Status: COMPLETED | OUTPATIENT
Start: 2018-12-20 | End: 2018-12-20

## 2018-12-20 RX ORDER — SODIUM CHLORIDE 9 MG/ML
INJECTION, SOLUTION INTRAVENOUS CONTINUOUS PRN
Status: DISCONTINUED | OUTPATIENT
Start: 2018-12-20 | End: 2018-12-20 | Stop reason: SURG

## 2018-12-20 RX ORDER — ACETAMINOPHEN 325 MG/1
650 TABLET ORAL EVERY 4 HOURS PRN
Status: DISCONTINUED | OUTPATIENT
Start: 2018-12-20 | End: 2018-12-20 | Stop reason: HOSPADM

## 2018-12-20 RX ADMIN — PROPOFOL 50 MCG/KG/MIN: 10 INJECTION, EMULSION INTRAVENOUS at 10:28

## 2018-12-20 RX ADMIN — SODIUM CHLORIDE: 0.9 INJECTION, SOLUTION INTRAVENOUS at 10:19

## 2018-12-20 RX ADMIN — MIDAZOLAM 2 MG: 1 INJECTION INTRAMUSCULAR; INTRAVENOUS at 10:26

## 2018-12-20 RX ADMIN — FENTANYL CITRATE 25 MCG: 50 INJECTION, SOLUTION INTRAMUSCULAR; INTRAVENOUS at 10:30

## 2018-12-20 RX ADMIN — FENTANYL CITRATE 25 MCG: 50 INJECTION, SOLUTION INTRAMUSCULAR; INTRAVENOUS at 11:13

## 2018-12-20 RX ADMIN — ISOPROTERENOL HYDROCHLORIDE 2 MCG/MIN: 0.2 INJECTION, SOLUTION INTRAMUSCULAR; INTRAVENOUS at 11:38

## 2018-12-20 RX ADMIN — LIDOCAINE HYDROCHLORIDE 8 ML: 10 INJECTION, SOLUTION INFILTRATION; PERINEURAL at 10:51

## 2018-12-20 RX ADMIN — LIDOCAINE HYDROCHLORIDE 80 MG: 20 INJECTION, SOLUTION INTRAVENOUS at 10:26

## 2018-12-20 NOTE — ANESTHESIA POSTPROCEDURE EVALUATION
Post-Op Assessment Note      CV Status:  Stable    Mental Status:  Alert and awake    Hydration Status:  Euvolemic    PONV Controlled:  Controlled    Airway Patency:  Patent    Post Op Vitals Reviewed: Yes          Staff: CRNA           BP   138/62   Temp      Pulse     Resp      SpO2   97

## 2018-12-20 NOTE — ANESTHESIA PREPROCEDURE EVALUATION
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,    Pulmonary  Negative pulmonary ROS        GI/Hepatic  Negative GI/hepatic ROS          Prostatic disorder, benign prostatic hyperplasia       Endo/Other  History of thyroid disease , hypothyroidism,      GYN       Hematology  Negative hematology ROS      Musculoskeletal  Negative musculoskeletal ROS        Neurology  Negative neurology ROS      Psychology   Negative psychology ROS              Physical Exam    Airway    Mallampati score: II  TM Distance: >3 FB  Neck ROM: full     Dental   No notable dental hx     Cardiovascular  Rhythm: regular, Rate: normal,     Pulmonary  Pulmonary exam normal Breath sounds clear to auscultation,     Other Findings        Anesthesia Plan  ASA Score- 2     Anesthesia Type- general and IV sedation with anesthesia with ASA Monitors  Additional Monitors:   Airway Plan: ETT  Plan Factors-    Induction- intravenous  Postoperative Plan-     Informed Consent- Anesthetic plan and risks discussed with patient  I personally reviewed this patient with the CRNA  Discussed and agreed on the Anesthesia Plan with the CRNA  Neftali Eaton

## 2018-12-20 NOTE — H&P
H&P Exam - Cardiology   Louie Francis 59 y o  male MRN: [id number]  Unit/Bed#: [facility] 05-01 Encounter: [id number]    Assessment/Plan     Assessment:  1  Symptomatic PVCs   - was tried on Flacainide but did not feel well on medication   - per Dr Errol Arevalo' office note: "frequent he had  20 5% of his beats (21,187) PVC, 297 couplets, 1 triplet, PVC on 12 lead is left bundle transition in V5, notched pos inf leads, pos lead I, this is from RVOT free wall"  2  1st degree AV block  3  Suspicion of sarcoidosis   - following with [facility] with cardiac FDG PET CT  4  H/o Lyme disease      Plan:  1  PVC ablation with EP study for inducible VT  2  If EP study positive, will implant dual chamber ICD and if negative, will await biopsy results to rule out sarcoidosis  History of Present Illness   HPI:  Louie Francis is a 59y o  year old male with a history of symptomatic PVCs, first degree AV block, and possible sarcoidosis  He was referred to Dr Errol Arevalo by Dr Brayan Acuña for his PVCs  He has become more symptomatic with them over the past couple months, feeling palpitations, dizziness, and intermittent shortness of breath with exertion at the gym  In addition to recommending a cardiac MRI to rule out sarcoidosis, a PVC ablation was recommended  He was also started on Flecainide to reduce PVC burden but he felt bad on the medication  He reports today for PVC ablation and EP study to try and induce VT  Should VT be inducible, an ICD will also be placed at this time  If not, ICD implantation can be reevaluated after the biopsy and MRI results come back  Patient is following with [facility] for MRI and biopsy  Review of Systems  ROS as noted above, otherwise 12 point review of systems was performed and is negative  Historical Information   No past medical history on file  No past surgical history on file  Family History: No family history on file      Social History   History Alcohol use Not on file     History   Drug use: Unknown     History   Smoking Status    Never Smoker   Smokeless Tobacco    Never Used         Meds/Allergies   all medications and allergies reviewed  Home Medications:   Prescriptions Prior to Admission   Medication    Ascorbic Acid (VITAMIN C) 100 MG tablet    aspirin 81 MG tablet    b complex vitamins capsule    cholecalciferol (VITAMIN D3) 1,000 units tablet    cyanocobalamin (VITAMIN B-12) 100 mcg tablet    finasteride (PROSCAR) 5 mg tablet    flecainide (TAMBOCOR) 50 mg tablet    FOLIC ACID PO    levothyroxine 100 mcg tablet    MAGNESIUM PO    tamsulosin (FLOMAX) 0 4 mg    vitamin E 100 UNIT capsule    ibuprofen (MOTRIN) 200 mg tablet       Allergies   Allergen Reactions    Hydromorphone Hallucinations     Patient saw things that were not there     Medical Tape      Itches        Objective   Vitals: Blood pressure 126/59, pulse 56, temperature 98 2 °F (36 8 °C), temperature source Oral, resp  rate 18, height 6' (1 829 m), weight 96 6 kg (213 lb), SpO2 96 %  Orthostatic Blood Pressures      Most Recent Value   Blood Pressure  126/59 filed at 12/20/2018 7432   Patient Position - Orthostatic VS  Lying filed at 12/20/2018 0857          No intake or output data in the 24 hours ending 12/20/18 1484    Invasive Devices     Peripheral Intravenous Line            Peripheral IV 12/20/18 Left Antecubital less than 1 day    Peripheral IV 12/20/18 Right Antecubital less than 1 day                Physical Exam  Physical Exam:   GEN: NAD, alert and oriented, well appearing  SKIN: dry without significant lesions or rashes  HEENT: NCAT, PERRL  NECK: No JVD appreciated  CARDIOVASCULAR: RRR without murmurs, rubs, or gallops appreciated  LUNGS: Good respiratory effort  ABDOMEN: Soft, nontender, nondistended  PSYCH: Normal mood and affect  NEURO: CN ll-Xll grossly intact      Lab Results: I have personally reviewed pertinent lab results        Results from last 7 days  Lab Units 18  0853   WBC Thousand/uL 5 12   HEMOGLOBIN g/dL 14 3   HEMATOCRIT % 43 8   PLATELETS Thousands/uL 225       Results from last 7 days  Lab Units 18  0853   POTASSIUM mmol/L 4 9   CHLORIDE mmol/L 111*   CO2 mmol/L 29   BUN mg/dL 20   CREATININE mg/dL 1 01   CALCIUM mg/dL 8 9           Results from last 7 days  Lab Units 18  0853   MAGNESIUM mg/dL 2 5         Imaging: I have personally reviewed pertinent reports  Results for orders placed during the hospital encounter of 18   Echo complete with contrast if indicated    Narrative [facility], [address]  [phone]    Transthoracic Echocardiogram  2D, M-mode, Doppler, and Color Doppler    Study date:  2018    Patient: Rubi Toledo  MR number: [id number]  Account number: [de-identified]  : 1955  Age: 65 years  Gender: Male  Status: Outpatient  Location: [address] [place] [address]  Height: 72 in  Weight: 215 6 lb  BP: 124/ 80 mmHg    Indications: Premature ventricular contractions, First degree AV block    Diagnoses: I44 0 - Atrioventricular block, first degree, I49 3 - Ventricular premature depolarization    Sonographer:  Anshu Ernst RDCS  Primary Physician:  Perla Victor MD  Referring Physician:  Kristen Macias MD  Group:  [id number]  Interpreting Physician:  Sienna Barros MD    SUMMARY    LEFT VENTRICLE:  Systolic function was at the lower limits of normal  Ejection fraction was estimated to be 55 %  There were no regional wall motion abnormalities  MITRAL VALVE:  There was trace regurgitation  TRICUSPID VALVE:  There was mild regurgitation  AORTA:  The root exhibited mild dilatation  The aortic root diameter was 43 mm  HISTORY: PRIOR HISTORY: Premature ventricular contractions, First degree AV block    PROCEDURE: The study was performed in the [address] [place] [address]   This was a routine study  The transthoracic approach was used  The study included complete 2D imaging, M-mode, complete spectral Doppler, and color Doppler  Images were obtained from the parasternal, apical, subcostal, and suprasternal notch acoustic windows  This was a technically difficult study  LEFT VENTRICLE: Size was normal  Systolic function was at the lower limits of normal  Ejection fraction was estimated to be 55 %  There were no regional wall motion abnormalities  Wall thickness was normal  No evidence of apical thrombus  DOPPLER: Left ventricular diastolic function parameters were normal     RIGHT VENTRICLE: The size was normal  Systolic function was normal  Wall thickness was normal     LEFT ATRIUM: Size was normal     RIGHT ATRIUM: Size was normal     MITRAL VALVE: Valve structure was normal  There was normal leaflet separation  DOPPLER: The transmitral velocity was within the normal range  There was no evidence for stenosis  There was trace regurgitation  AORTIC VALVE: The valve was trileaflet  Leaflets exhibited normal thickness and normal cuspal separation  DOPPLER: Transaortic velocity was within the normal range  There was no evidence for stenosis  There was no significant  regurgitation  TRICUSPID VALVE: The valve structure was normal  There was normal leaflet separation  DOPPLER: The transtricuspid velocity was within the normal range  There was no evidence for stenosis  There was mild regurgitation  PULMONIC VALVE: Leaflets exhibited normal thickness, no calcification, and normal cuspal separation  DOPPLER: The transpulmonic velocity was within the normal range  There was no significant regurgitation  PERICARDIUM: There was no pericardial effusion  The pericardium was normal in appearance  AORTA: The root exhibited mild dilatation  SYSTEMIC VEINS: IVC: The inferior vena cava was normal in size      PULMONARY VEINS: DOPPLER: Doppler flow pattern was normal in the pulmonary vein(s)  MEASUREMENT TABLES    2D MEASUREMENTS  Aorta   (Reference normals)  Root diam   43 mm   (--)    SYSTEM MEASUREMENT TABLES    2D  %FS: 24 8 %  Ao Diam: 4 34 cm  Ao asc: 3 74 cm  EDV(Teich): 121 7 ml  EF Biplane: 57 35 %  EF(Teich): 48 88 %  ESV(Cube): 55 17 ml  ESV(Teich): 62 22 ml  IVSd: 0 94 cm  LA Area: 18 93 cm2  LA Diam: 3 79 cm  LVEDV MOD A2C: 136 12 ml  LVEDV MOD A4C: 120 96 ml  LVEDV MOD BP: 129 72 ml  LVEF MOD A2C: 58 1 %  LVEF MOD A4C: 56 23 %  LVESV MOD A2C: 57 04 ml  LVESV MOD A4C: 52 95 ml  LVESV MOD BP: 55 32 ml  LVIDd: 5 06 cm  LVIDs: 3 81 cm  LVLd A2C: 9 93 cm  LVLd A4C: 9 68 cm  LVLs A2C: 8 39 cm  LVLs A4C: 8 16 cm  LVPWd: 0 88 cm  RA Area: 18 24 cm2  RV Diam : 4 14 cm  SI(Cube): 33 9 ml/m2  SI(Teich): 27 04 ml/m2  SV MOD A2C: 79 08 ml  SV MOD A4C: 68 02 ml  SV(Cube): 74 57 ml  SV(Teich): 59 48 ml    CW  TR Vmax: 1 94 m/s  TR maxPG: 15 06 mmHg    MM  TAPSE: 2 43 cm    PW  AVC: 403 43 ms  E': 0 12 m/s  E/E': 5 58  MV A Johnny: 0 65 m/s  MV Dec Snyder: 2 46 m/s2  MV DecT: 271 53 ms  MV E Johnny: 0 67 m/s  MV E/A Ratio: 1 02    [employer] Accredited Echocardiography Laboratory    Prepared and electronically signed by    Neeru Miller MD  Signed 23-Jul-2018 15:31:34         ECHO (7/23/18):  LEFT VENTRICLE:  Systolic function was at the lower limits of normal  Ejection fraction was estimated to be 55 %  There were no regional wall motion abnormalities      MITRAL VALVE:  There was trace regurgitation      TRICUSPID VALVE:  There was mild regurgitation      AORTA:  The root exhibited mild dilatation  The aortic root diameter was 43 mm      HOLTER (7/9/18): strips with multiple PVCs    Code Status: No Order

## 2019-01-03 ENCOUNTER — OFFICE VISIT (OUTPATIENT)
Dept: CARDIOLOGY CLINIC | Facility: CLINIC | Age: 64
End: 2019-01-03

## 2019-01-03 VITALS
DIASTOLIC BLOOD PRESSURE: 80 MMHG | WEIGHT: 212 LBS | BODY MASS INDEX: 28.1 KG/M2 | HEIGHT: 73 IN | HEART RATE: 54 BPM | SYSTOLIC BLOOD PRESSURE: 100 MMHG

## 2019-01-03 DIAGNOSIS — I49.3 PVC (PREMATURE VENTRICULAR CONTRACTION): Primary | ICD-10-CM

## 2019-01-03 PROCEDURE — 99213 OFFICE O/P EST LOW 20 MIN: CPT | Performed by: INTERNAL MEDICINE

## 2019-01-03 PROCEDURE — 93000 ELECTROCARDIOGRAM COMPLETE: CPT | Performed by: INTERNAL MEDICINE

## 2019-01-03 NOTE — PROGRESS NOTES
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)  Feels much better since ablation  Prior to ablation, PVC 20 5% of his beats (21,187) PVC, 297 couplets, 1 triplet, PVC on 12 lead is left bundle transition in V5, notched pos inf leads, pos lead I, this is from RVOT free wall  He believes he had PVC as early as 25s, but recently symptomatic for past month, has has had palpitations, feeling irregulary heart beat, dizzyness (at night going to bathroom) and  Intermittent SOB w exercise at gym  He is very fit, works out 3 times a week w intensity, and climbs mountains  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 and noted PVC's and bradycardia, first degree av block  STress test and echo were normal but Cardiac MRI did show mild RV enlargement and reduced RV funciton and 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, which correlates to region PVC is coming from  PET CT did NOT show sarcoidosis and bx of LN in abdomen did not show sarcoid but may show lymphoma, he is having full excisional bx soon  2) First degree av block   3) 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 w rash and arthritis, "foggyness" palpitatons, 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  4) Grandfather  45s, suddenly, "heart attack" after biking on camping trip  5) Atherosclerosis of aorta on PET CT      PLAN:  1  No further cardiac restrictions, he can resume exercise   PVC's have dramatically improved and no evidence of sarcoidosis  F/u in 6months  2) F/u w [place] for lymphoma workup  3) Diet recommendatgions for low cholesterol diet  He is very fit and not overweight  Low risk profile for CAD otherwise  Orders Placed This Encounter   Procedures    POCT ECG     Medications Discontinued During This Encounter   Medication Reason    ibuprofen (MOTRIN) 200 mg tablet Therapy completed             HPI/Subjective:   66 yo male  1) 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)  Feels much better since ablation  Prior to ablation, PVC 20 5% of his beats (21,187) PVC, 297 couplets, 1 triplet, PVC on 12 lead is left bundle transition in V5, notched pos inf leads, pos lead I, this is from RVOT free wall  He believes he had PVC as early as 25s, but recently symptomatic for past month, has has had palpitations, feeling irregulary heart beat, dizzyness (at night going to bathroom) and  Intermittent SOB w exercise at gym  He is very fit, works out 3 times a week w intensity, and climbs mountains  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 and noted PVC's and bradycardia, first degree av block  STress test and echo were normal but Cardiac MRI did show mild RV enlargement and reduced RV funciton and 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, which correlates to region PVC is coming from  PET CT did NOT show sarcoidosis and bx of LN in abdomen did not show sarcoid but may show lymphoma, he is having full excisional bx soon  2) First degree av block   3) 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, actually had 2 years of doxy, tick bite in [facility] w rash and arthritis, "foggyness" palpitatons, 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  4) Grandfather  45s, suddenly, "heart attack" after biking on camping trip  5) Atherosclerosis of aorta on PET CT  ACE level bordeline elevated at 79         Complete 12 point ROS reviewed and otherwise non pertinent or negative except as per HPI pertinent positives in Cardiovascular and Respiratory emphasized  Please see paper chart for outpatient clinic patients where the patient completed the 12 point ROS survey  No past medical history on file  Allergies   Allergen Reactions    Hydromorphone Hallucinations     Patient saw things that were not there     Medical Tape      Itches      I reviewed the Home Medication list and Allergies in the chart  Scheduled Meds:  Current Outpatient Prescriptions   Medication Sig Dispense Refill    Ascorbic Acid (VITAMIN C) 100 MG tablet Take 100 mg by mouth daily      aspirin 81 MG tablet Take 81 mg by mouth      b complex vitamins capsule Take 1 capsule by mouth daily      cholecalciferol (VITAMIN D3) 1,000 units tablet Take 1,000 Units by mouth daily      cyanocobalamin (VITAMIN B-12) 100 mcg tablet Take 750 mcg by mouth daily        finasteride (PROSCAR) 5 mg tablet TAKE 1 TABLET BY MOUTH DAILY      FOLIC ACID PO Take 1 tablet by mouth Every 3-4 days      levothyroxine 100 mcg tablet TAKE 1 TABLET BY MOUTH DAILY      MAGNESIUM PO Take 1 tablet by mouth daily      tamsulosin (FLOMAX) 0 4 mg Take 0 4 mg by mouth daily with dinner        vitamin E 100 UNIT capsule Take 100 Units by mouth daily       No current facility-administered medications for this visit  PRN Meds:  No family history on file  Social History     Social History    Marital status: /Civil Union     Spouse name: N/A    Number of children: N/A    Years of education: N/A     Occupational History    Not on file       Social History Main Topics    Smoking status: Never Smoker    Smokeless tobacco: Never Used    Alcohol use Yes      Comment: rare    Drug use: No    Sexual activity: Not on file     Other Topics Concern    Not on file     Social History Narrative    No narrative on file         OBJECTIVE:    /80 (BP Location: Left arm, Patient Position: Sitting, Cuff Size: Large)   Pulse (!) 54   Ht 6' 1" (1 854 m)   Wt 96 2 kg (212 lb)   BMI 27 97 kg/m²   Vitals:    01/03/19 1020   Weight: 96 2 kg (212 lb)     GEN: No acute distress, Alert and oriented, well appearing  HEENT:Head, neck, ears, oral pharynx: Mucus membranes moist, oral pharynx clear, nares clear  External ears normal  EYES: Pupils equal, sclera anicteric, midline, normal conjuctiva  NECK: No JVD, supple, no obvious masses or thryomegaly or goiter  CARDIOVASCULAR:  RRR, No murmur, rub, gallops S1,S2  LUNGS: Clear To auscultation bilaterally, normal effort, no rales, rhonchi, crackles  ABDOMEN:  nondistended,  without obvious organomegaly or ascites  EXTREMITIES/VASCULAR:  No edema  Radial pulses intact, pedal pulses difficult to palpate, warm an well perfused  PSYCH: Normal Affect, no overt suicidal ideation, linear speech pattern without evidence of psychosis  NEURO: Grossly intact, moving all extremiteis equal, face symmetric, alert and responsive, no obvious focal defecits  GAIT:  Ambulates normally without difficulty  HEME: No bleeding, bruising, petechia, purpura  SKIN: No significant rashes, warm, no diaphoresis or pallor       Lab Results:       LABS:      Chemistry        Component Value Date/Time    K 4 9 12/20/2018 0853    K 4 6 10/29/2018 1225     (H) 12/20/2018 0853     10/29/2018 1225    CO2 29 12/20/2018 0853    CO2 29 10/29/2018 1225    BUN 20 12/20/2018 0853    BUN 21 10/29/2018 1225    CREATININE 1 01 12/20/2018 0853        Component Value Date/Time    CALCIUM 8 9 12/20/2018 0853    CALCIUM 9 6 10/29/2018 1225    ALKPHOS 75 08/31/2018 0912            No results found for: CHOL  No results found for: HDL  No results found for: LDLCALC  No results found for: TRIG  No results found for: CHOLHDL            I reviewed and interpreted the following LABS/EKG/TELE/IMAGING and below is summary of my interpretation (if data available):    LABS:see lipid profile in discussion    Current EKG and Rhythm Strip: NSR first degree AV block, no PVC    50 min spent face to face, 80 min spent on encounter

## 2019-09-11 ENCOUNTER — OFFICE VISIT (OUTPATIENT)
Dept: CARDIOLOGY CLINIC | Facility: CLINIC | Age: 64
End: 2019-09-11
Payer: COMMERCIAL

## 2019-09-11 VITALS
HEIGHT: 73 IN | HEART RATE: 69 BPM | SYSTOLIC BLOOD PRESSURE: 120 MMHG | BODY MASS INDEX: 26.64 KG/M2 | DIASTOLIC BLOOD PRESSURE: 86 MMHG | WEIGHT: 201 LBS

## 2019-09-11 DIAGNOSIS — I49.3 PVC (PREMATURE VENTRICULAR CONTRACTION): Primary | ICD-10-CM

## 2019-09-11 PROCEDURE — 99213 OFFICE O/P EST LOW 20 MIN: CPT | Performed by: INTERNAL MEDICINE

## 2019-09-11 PROCEDURE — 93000 ELECTROCARDIOGRAM COMPLETE: CPT | Performed by: INTERNAL MEDICINE

## 2019-09-11 NOTE — PROGRESS NOTES
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)  Feels much better since ablation  Prior to ablation, PVC 20 5% of his beats (21,187) PVC, 297 couplets, 1 triplet, PVC on 12 lead is left bundle transition in V5, notched pos inf leads, pos lead I, this is from RVOT free wall  He believes he had PVC as early as 25s, but recently symptomatic for past month, has has had palpitations, feeling irregulary heart beat, dizzyness (at night going to bathroom) and  Intermittent SOB w exercise at gym  He is very fit, works out 3 times a week w intensity, and climbs mountains  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 and noted PVC's and bradycardia, first degree av block  STress test and echo were normal but Cardiac MRI did show mild RV enlargement and reduced RV funciton and 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, which correlates to region PVC is coming from  PET CT did NOT show sarcoidosis and bx of LN in abdomen did not show sarcoid but may show lymphoma, he is having full excisional bx soon  2) First degree av block  Resolved on EKG today  3) 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, actually had 2 years of doxy, tick bite in [facility] w rash and arthritis, "foggyness" palpitatons, 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  4) Grandfather  45s, suddenly, "heart attack" after biking on camping trip  5) Atherosclerosis of aorta on PET CT  6) Lymphadenopathy, bx at [facility] inconclusive but suggestive of B cell lymphoma  He follows there    PLAN:  1  Doing well, no PVC's  Rare palpitations  Reassurance  2) F/u w BARRETT for lymphoma workup  F/u 1 year      Orders Placed This Encounter   Procedures    POCT ECG     There are no discontinued medications    HPI/Subjective:   60 yo male  1) 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)  Feels much better since ablation  Prior to ablation, PVC 20 5% of his beats (21,187) PVC, 297 couplets, 1 triplet, PVC on 12 lead is left bundle transition in V5, notched pos inf leads, pos lead I, this is from RVOT free wall  He believes he had PVC as early as 25s, but recently symptomatic for past month, has has had palpitations, feeling irregulary heart beat, dizzyness (at night going to bathroom) and  Intermittent SOB w exercise at gym  He is very fit, works out 3 times a week w intensity, and climbs mountains  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 and noted PVC's and bradycardia, first degree av block  STress test and echo were normal but Cardiac MRI did show mild RV enlargement and reduced RV funciton and 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, which correlates to region PVC is coming from  PET CT did NOT show sarcoidosis and bx of LN in abdomen did not show sarcoid but may show lymphoma, he is having full excisional bx soon  2) First degree av block  Resolved on EKG today  3) 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, actually had 2 years of doxy, tick bite in [facility] w rash and arthritis, "foggyness" palpitatons, 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  4) Grandfather  45s, suddenly, "heart attack" after biking on camping trip  5) Atherosclerosis of aorta on PET CT  6) Lymphadenopathy, bx at [facility] inconclusive but suggestive of B cell lymphoma  He follows there    Feels good, climbing mountains, rare CP/palpitations  C/o night sweats sometimes       Complete 12 point ROS reviewed and otherwise non pertinent or negative except as per HPI pertinent positives in Cardiovascular and Respiratory emphasized  Please see paper chart for outpatient clinic patients where the patient completed the 12 point ROS survey  No past medical history on file  Allergies   Allergen Reactions    Hydromorphone Hallucinations and Other (See Comments)     hallucinosis  hallucinosis  Patient saw things that were not there     Medical Tape      Itches      I reviewed the Home Medication list and Allergies in the chart  Scheduled Meds:  Current Outpatient Medications   Medication Sig Dispense Refill    Ascorbic Acid (VITAMIN C) 100 MG tablet Take 100 mg by mouth daily      aspirin 81 MG tablet Take 81 mg by mouth      b complex vitamins capsule Take 1 capsule by mouth daily      cholecalciferol (VITAMIN D3) 1,000 units tablet Take 1,000 Units by mouth daily      cyanocobalamin (VITAMIN B-12) 100 mcg tablet Take 750 mcg by mouth daily        finasteride (PROSCAR) 5 mg tablet TAKE 1 TABLET BY MOUTH DAILY      FOLIC ACID PO Take 1 tablet by mouth Every 3-4 days      levothyroxine 100 mcg tablet TAKE 1 TABLET BY MOUTH DAILY      MAGNESIUM PO Take 1 tablet by mouth daily      tamsulosin (FLOMAX) 0 4 mg Take 0 4 mg by mouth daily with dinner        vitamin E 100 UNIT capsule Take 100 Units by mouth daily       No current facility-administered medications for this visit  PRN Meds:  No family history on file      Social History     Socioeconomic History    Marital status: /Civil Union     Spouse name: Not on file    Number of children: Not on file    Years of education: Not on file    Highest education level: Not on file   Occupational History    Not on file   Social Needs    Financial resource strain: Not on file    Food insecurity:     Worry: Not on file     Inability: Not on file    Transportation needs:     Medical: Not on file     Non-medical: Not on file   Tobacco Use    Smoking status: Never Smoker    Smokeless tobacco: Never Used   Substance and Sexual Activity    Alcohol use: Yes     Comment: rare    Drug use: No    Sexual activity: Not on file   Lifestyle    Physical activity:     Days per week: Not on file     Minutes per session: Not on file    Stress: Not on file   Relationships    Social connections:     Talks on phone: Not on file     Gets together: Not on file     Attends [employer] service: Not on file     Active member of club or organization: Not on file     Attends meetings of clubs or organizations: Not on file     Relationship status: Not on file    Intimate partner violence:     Fear of current or ex partner: Not on file     Emotionally abused: Not on file     Physically abused: Not on file     Forced sexual activity: Not on file   Other Topics Concern    Not on file   Social History Narrative    Not on file         OBJECTIVE:    /86   Pulse 69   Ht 6' 1" (1 854 m)   Wt 91 2 kg (201 lb)   BMI 26 52 kg/m²   Vitals:    09/11/19 1620   Weight: 91 2 kg (201 lb)     GEN: No acute distress, Alert and oriented, well appearing  HEENT:Head, neck, ears, oral pharynx: Mucus membranes moist, oral pharynx clear, nares clear  External ears normal  EYES: Pupils equal, sclera anicteric, midline, normal conjuctiva  NECK: No JVD, supple, no obvious masses or thryomegaly or goiter  CARDIOVASCULAR:  RRR, No murmur, rub, gallops S1,S2  LUNGS: Clear To auscultation bilaterally, normal effort, no rales, rhonchi, crackles  ABDOMEN:  nondistended,  without obvious organomegaly or ascites  EXTREMITIES/VASCULAR:  No edema  Radial pulses intact, pedal pulses difficult to palpate, warm an well perfused  PSYCH: Normal Affect, no overt suicidal ideation, linear speech pattern without evidence of psychosis     NEURO: Grossly intact, moving all extremiteis equal, face symmetric, alert and responsive, no obvious focal defecits  GAIT:  Ambulates normally without difficulty  HEME: No bleeding, bruising, petechia, purpura  SKIN: No significant rashes, warm, no diaphoresis or pallor       Lab Results:       LABS:      Chemistry        Component Value Date/Time    K 4 9 12/20/2018 0853    K 4 6 10/29/2018 1225     (H) 12/20/2018 0853     10/29/2018 1225    CO2 29 12/20/2018 0853    CO2 29 10/29/2018 1225    BUN 20 12/20/2018 0853    BUN 21 10/29/2018 1225    CREATININE 1 01 12/20/2018 0853        Component Value Date/Time    CALCIUM 8 9 12/20/2018 0853    CALCIUM 9 6 10/29/2018 1225    ALKPHOS 75 08/31/2018 0912    AST 22 08/31/2018 0912    ALT 18 08/31/2018 0912            No results found for: CHOL  No results found for: HDL  No results found for: LDLCALC  No results found for: TRIG  No results found for: CHOLHDL            I reviewed and interpreted the following LABS/EKG/TELE/IMAGING and below is summary of my interpretation (if data available):    LABS:see lipid profile in discussion    Current EKG and Rhythm Strip: NSR normal EKG, no PVC

## 2019-12-19 ENCOUNTER — APPOINTMENT (OUTPATIENT)
Dept: RADIOLOGY | Facility: HOSPITAL | Age: 64
End: 2019-12-19
Payer: COMMERCIAL

## 2019-12-19 ENCOUNTER — APPOINTMENT (EMERGENCY)
Dept: RADIOLOGY | Facility: HOSPITAL | Age: 64
End: 2019-12-19
Payer: COMMERCIAL

## 2019-12-19 ENCOUNTER — HOSPITAL ENCOUNTER (OUTPATIENT)
Facility: HOSPITAL | Age: 64
Setting detail: OBSERVATION
Discharge: HOME | End: 2019-12-20
Attending: EMERGENCY MEDICINE | Admitting: INTERNAL MEDICINE
Payer: COMMERCIAL

## 2019-12-19 DIAGNOSIS — R42 DIZZINESS: ICD-10-CM

## 2019-12-19 DIAGNOSIS — R77.8 ELEVATED TROPONIN: ICD-10-CM

## 2019-12-19 DIAGNOSIS — R55 SYNCOPE: ICD-10-CM

## 2019-12-19 DIAGNOSIS — R55 SYNCOPE, UNSPECIFIED SYNCOPE TYPE: Primary | ICD-10-CM

## 2019-12-19 PROBLEM — E03.9 HYPOTHYROID: Status: ACTIVE | Noted: 2019-12-19

## 2019-12-19 PROBLEM — R79.89 ELEVATED TROPONIN: Status: ACTIVE | Noted: 2019-12-19

## 2019-12-19 LAB
ALBUMIN SERPL BCP-MCNC: 3.6 G/DL (ref 3.5–5)
ALP SERPL-CCNC: 68 U/L (ref 46–116)
ALT SERPL W P-5'-P-CCNC: 28 U/L (ref 12–78)
ANION GAP SERPL CALCULATED.3IONS-SCNC: 3 MMOL/L (ref 4–13)
AST SERPL W P-5'-P-CCNC: 21 U/L (ref 5–45)
ATRIAL RATE: 63 BPM
BASOPHILS # BLD AUTO: 0.05 THOUSANDS/ΜL (ref 0–0.1)
BASOPHILS NFR BLD AUTO: 1 % (ref 0–1)
BILIRUB SERPL-MCNC: 0.46 MG/DL (ref 0.2–1)
BUN SERPL-MCNC: 23 MG/DL (ref 5–25)
CALCIUM SERPL-MCNC: 9.2 MG/DL (ref 8.3–10.1)
CHLORIDE SERPL-SCNC: 110 MMOL/L (ref 100–108)
CO2 SERPL-SCNC: 29 MMOL/L (ref 21–32)
CREAT SERPL-MCNC: 0.94 MG/DL (ref 0.6–1.3)
EOSINOPHIL # BLD AUTO: 0.54 THOUSAND/ΜL (ref 0–0.61)
EOSINOPHIL NFR BLD AUTO: 8 % (ref 0–6)
ERYTHROCYTE [DISTWIDTH] IN BLOOD BY AUTOMATED COUNT: 12 % (ref 11.6–15.1)
GFR SERPL CREATININE-BSD FRML MDRD: 86 ML/MIN/1.73SQ M
GLUCOSE SERPL-MCNC: 77 MG/DL (ref 65–140)
HCT VFR BLD AUTO: 44.7 % (ref 36.5–49.3)
HGB BLD-MCNC: 14.5 G/DL (ref 12–17)
IMM GRANULOCYTES # BLD AUTO: 0.01 THOUSAND/UL (ref 0–0.2)
IMM GRANULOCYTES NFR BLD AUTO: 0 % (ref 0–2)
LYMPHOCYTES # BLD AUTO: 1.79 THOUSANDS/ΜL (ref 0.6–4.47)
LYMPHOCYTES NFR BLD AUTO: 26 % (ref 14–44)
MCH RBC QN AUTO: 31 PG (ref 26.8–34.3)
MCHC RBC AUTO-ENTMCNC: 32.4 G/DL (ref 31.4–37.4)
MCV RBC AUTO: 96 FL (ref 82–98)
MONOCYTES # BLD AUTO: 0.44 THOUSAND/ΜL (ref 0.17–1.22)
MONOCYTES NFR BLD AUTO: 6 % (ref 4–12)
NEUTROPHILS # BLD AUTO: 4.12 THOUSANDS/ΜL (ref 1.85–7.62)
NEUTS SEG NFR BLD AUTO: 59 % (ref 43–75)
NRBC BLD AUTO-RTO: 0 /100 WBCS
P AXIS: 63 DEGREES
PLATELET # BLD AUTO: 229 THOUSANDS/UL (ref 149–390)
PMV BLD AUTO: 9.1 FL (ref 8.9–12.7)
POTASSIUM SERPL-SCNC: 4.2 MMOL/L (ref 3.5–5.3)
PR INTERVAL: 220 MS
PROT SERPL-MCNC: 7.1 G/DL (ref 6.4–8.2)
QRS AXIS: 55 DEGREES
QRSD INTERVAL: 94 MS
QT INTERVAL: 410 MS
QTC INTERVAL: 419 MS
RBC # BLD AUTO: 4.67 MILLION/UL (ref 3.88–5.62)
SODIUM SERPL-SCNC: 142 MMOL/L (ref 136–145)
T WAVE AXIS: 51 DEGREES
TROPONIN I SERPL-MCNC: 0.05 NG/ML
TROPONIN I SERPL-MCNC: <0.02 NG/ML
TROPONIN I SERPL-MCNC: <0.02 NG/ML
TSH SERPL DL<=0.05 MIU/L-ACNC: 0.84 UIU/ML (ref 0.36–3.74)
TSH SERPL DL<=0.05 MIU/L-ACNC: 0.89 UIU/ML (ref 0.36–3.74)
VENTRICULAR RATE: 63 BPM
WBC # BLD AUTO: 6.95 THOUSAND/UL (ref 4.31–10.16)

## 2019-12-19 PROCEDURE — 99203 OFFICE O/P NEW LOW 30 MIN: CPT | Performed by: PSYCHIATRY & NEUROLOGY

## 2019-12-19 PROCEDURE — 93010 ELECTROCARDIOGRAM REPORT: CPT | Performed by: INTERNAL MEDICINE

## 2019-12-19 PROCEDURE — 80053 COMPREHEN METABOLIC PANEL: CPT

## 2019-12-19 PROCEDURE — 84484 ASSAY OF TROPONIN QUANT: CPT

## 2019-12-19 PROCEDURE — 70498 CT ANGIOGRAPHY NECK: CPT

## 2019-12-19 PROCEDURE — 99285 EMERGENCY DEPT VISIT HI MDM: CPT

## 2019-12-19 PROCEDURE — 84443 ASSAY THYROID STIM HORMONE: CPT | Performed by: PSYCHIATRY & NEUROLOGY

## 2019-12-19 PROCEDURE — 99285 EMERGENCY DEPT VISIT HI MDM: CPT | Performed by: EMERGENCY MEDICINE

## 2019-12-19 PROCEDURE — 71046 X-RAY EXAM CHEST 2 VIEWS: CPT

## 2019-12-19 PROCEDURE — 84484 ASSAY OF TROPONIN QUANT: CPT | Performed by: GENERAL PRACTICE

## 2019-12-19 PROCEDURE — 85025 COMPLETE CBC W/AUTO DIFF WBC: CPT

## 2019-12-19 PROCEDURE — 93005 ELECTROCARDIOGRAM TRACING: CPT

## 2019-12-19 PROCEDURE — 70496 CT ANGIOGRAPHY HEAD: CPT

## 2019-12-19 PROCEDURE — 99219 PR INITIAL OBSERVATION CARE/DAY 50 MINUTES: CPT | Performed by: GENERAL PRACTICE

## 2019-12-19 PROCEDURE — 36415 COLL VENOUS BLD VENIPUNCTURE: CPT

## 2019-12-19 PROCEDURE — 84443 ASSAY THYROID STIM HORMONE: CPT | Performed by: EMERGENCY MEDICINE

## 2019-12-19 PROCEDURE — 99215 OFFICE O/P EST HI 40 MIN: CPT | Performed by: INTERNAL MEDICINE

## 2019-12-19 RX ORDER — ASPIRIN 81 MG/1
81 TABLET, CHEWABLE ORAL DAILY
Status: DISCONTINUED | OUTPATIENT
Start: 2019-12-19 | End: 2019-12-20 | Stop reason: HOSPADM

## 2019-12-19 RX ORDER — FINASTERIDE 5 MG/1
5 TABLET, FILM COATED ORAL DAILY
Status: DISCONTINUED | OUTPATIENT
Start: 2019-12-19 | End: 2019-12-20 | Stop reason: HOSPADM

## 2019-12-19 RX ORDER — ACETAMINOPHEN 325 MG/1
650 TABLET ORAL EVERY 6 HOURS PRN
Status: DISCONTINUED | OUTPATIENT
Start: 2019-12-19 | End: 2019-12-20 | Stop reason: HOSPADM

## 2019-12-19 RX ORDER — RIBOFLAVIN (VITAMIN B2) 100 MG
100 TABLET ORAL DAILY
Status: DISCONTINUED | OUTPATIENT
Start: 2019-12-19 | End: 2019-12-19 | Stop reason: RX

## 2019-12-19 RX ORDER — LEVOTHYROXINE SODIUM 0.1 MG/1
100 TABLET ORAL
Status: DISCONTINUED | OUTPATIENT
Start: 2019-12-19 | End: 2019-12-20 | Stop reason: HOSPADM

## 2019-12-19 RX ORDER — TAMSULOSIN HYDROCHLORIDE 0.4 MG/1
0.4 CAPSULE ORAL
Status: DISCONTINUED | OUTPATIENT
Start: 2019-12-19 | End: 2019-12-20 | Stop reason: HOSPADM

## 2019-12-19 RX ORDER — MELATONIN
1000 DAILY
Status: DISCONTINUED | OUTPATIENT
Start: 2019-12-19 | End: 2019-12-20 | Stop reason: HOSPADM

## 2019-12-19 RX ADMIN — CYANOCOBALAMIN TAB 500 MCG 750 MCG: 500 TAB at 14:10

## 2019-12-19 RX ADMIN — MELATONIN 1000 UNITS: at 14:11

## 2019-12-19 RX ADMIN — ENOXAPARIN SODIUM 40 MG: 40 INJECTION SUBCUTANEOUS at 14:12

## 2019-12-19 RX ADMIN — ZINC 1 TABLET: TAB ORAL at 18:21

## 2019-12-19 RX ADMIN — TAMSULOSIN HYDROCHLORIDE 0.4 MG: 0.4 CAPSULE ORAL at 18:22

## 2019-12-19 RX ADMIN — FINASTERIDE 5 MG: 5 TABLET, FILM COATED ORAL at 18:23

## 2019-12-19 RX ADMIN — Medication 200 UNITS: at 14:09

## 2019-12-19 RX ADMIN — IOHEXOL 85 ML: 350 INJECTION, SOLUTION INTRAVENOUS at 16:16

## 2019-12-19 NOTE — CONSULTS
Electrophysiology-Cardiology (EP)   Anuel Guzman 62 y o  male MRN: [id number]  Unit/Bed#: [facility] 715-01 Encounter: [id number]        IMPRESSION:  1  Syncope w prodrome suspect vasovagal (in setting of going to restroom from sleep, wife worried he was having a "stroke" and thought he appeared confused for 35s)  Troponon 0 05 minimally elevation would be demand related  Do not suspect ACS  Normal stress test July 2018  2  H/o successful PVC ablation, no recurrence seen, EKG and tele is normal> Successful PV ablation in past  He had inflammatory pattern on septum in MRI  3  Aortic dilation 43mm  4  Questionable h/o lymphoma or other inflammatory condition causing lymphadenopathy  Not on chemo, indolent disease  He went to [facility] and [facility], recent LN bx    PLAN:  1  Reassurance  Likely vasovagal syncope  2  TTE to make sure no change in EF  3  Appreciate neuro recommendations  4  CHeck 2 week Zio patch event monitor outpatient, we will set up    F/u routinely w me in EP office  We will sign off  Referring Physian: Saniya Hayes MD    Chief Complain/Reason for Referal: Syncope  Vianey Joelodore is a 62 y o  Patient Active Problem List    Diagnosis Date Noted    Elevated troponin 12/19/2019     Priority: Low    Hypothyroid 12/19/2019     Priority: Low    Syncope 10/24/2018    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       1  Syncope w prodrome suspect vasovagal (in setting of going to restroom from sleep, wife worried he was having a "stroke" and thought he appeared confused for 35s)  Troponon 0 05 minimally elevation would be demand related  Do not suspect ACS  Normal stress test July 2018  2  H/o successful PVC ablation, no recurrence seen> Successful PV ablation in past  He had inflmmatory pattern on septum in MRI  3  Aortic dilation 43mm  4  Questionable h/o lymphoma or other inflammatory condition causing lymphadenopathy  Not on chemo, indolant disease   He went to [facility] and [facility] Noel, recent LN bx    Danny Ely was getting up from bed to go to [facility] felt light headed and slumped over, wife helped him  No injury  She witness his facial expression change and he seemed confused for about 35s so she worried about a stroke  No CP or SOB  He has been getting dizzy on occasion lately but nothing like this  He has been getting w/u for his possible lymphadenopathy and went to [place] and had bx and reassuring results  Past Medical History:   Diagnosis Date    Disease of thyroid gland        Medications Prior to Admission   Medication    Ascorbic Acid (VITAMIN C) 100 MG tablet    aspirin 81 MG tablet    b complex vitamins capsule    cholecalciferol (VITAMIN D3) 1,000 units tablet    Cholecalciferol 25 MCG (1000 UT) capsule    cyanocobalamin (VITAMIN B-12) 100 mcg tablet    finasteride (PROSCAR) 5 mg tablet    FOLIC ACID PO    levothyroxine 100 mcg tablet    MAGNESIUM PO    tamsulosin (FLOMAX) 0 4 mg    vitamin E 100 UNIT capsule       Scheduled Meds:  Current Facility-Administered Medications:  acetaminophen 650 mg Oral Q6H PRN Diana Davidson, DO   aspirin 81 mg Oral Daily Diana Davidson, DO   cholecalciferol 1,000 Units Oral Daily Diana Davidson, DO   cyanocobalamin 750 mcg Oral Daily Diana Davidson, DO   enoxaparin 40 mg Subcutaneous Daily Diana Davidson, DO   finasteride 5 mg Oral Daily Diana Davidson, DO   levothyroxine 100 mcg Oral Early Morning Diana Davidson, DO   multivitamin stress formula 1 tablet Oral Daily Diana Davidson, DO   tamsulosin 0 4 mg Oral Daily [facility], DO   vitamin E (tocopherol) 200 Units Oral Daily Diana Davidson, DO     Continuous Infusions:   PRN Meds:   acetaminophen  Allergies   Allergen Reactions    Hydromorphone Hallucinations and Other (See Comments)     hallucinosis  hallucinosis  Patient saw things that were not there     Medical Tape      Itches      I reviewed the Home Medication list in the chart  History reviewed   No pertinent family history  Social History     Socioeconomic History    Marital status: /Civil Union     Spouse name: Not on file    Number of children: Not on file    Years of education: Not on file    Highest education level: Not on file   Occupational History    Not on file   Social Needs    Financial resource strain: Not on file    Food insecurity:     Worry: Not on file     Inability: Not on file    Transportation needs:     Medical: Not on file     Non-medical: Not on file   Tobacco Use    Smoking status: Never Smoker    Smokeless tobacco: Never Used   Substance and Sexual Activity    Alcohol use: Yes     Comment: rare    Drug use: No    Sexual activity: Not on file   Lifestyle    Physical activity:     Days per week: Not on file     Minutes per session: Not on file    Stress: Not on file   Relationships    Social connections:     Talks on phone: Not on file     Gets together: Not on file     Attends Mandaeism service: Not on file     Active member of club or organization: Not on file     Attends meetings of clubs or organizations: Not on file     Relationship status: Not on file    Intimate partner violence:     Fear of current or ex partner: Not on file     Emotionally abused: Not on file     Physically abused: Not on file     Forced sexual activity: Not on file   Other Topics Concern    Not on file   Social History Narrative    Not on file       Review of Systems -12 Point ROS reviewed and are negative or noted in chart except for Pertinent Positives Pertaining to Cardiovascular and Respiratory in HPI above  Vitals:    12/19/19 1744   BP:    Pulse: 58   Resp:    Temp:    SpO2: 97%     Vitals:    12/19/19 0944 12/19/19 1740   Weight: 88 5 kg (195 lb) 88 kg (194 lb)   No intake or output data in the 24 hours ending 12/19/19 1804      GEN: Now acute distress, Alert and oriented, well appearing  HEENT:Head, neck, ears, oral pharynx: Mucus membranes moist, oral pharynx clear, nares clear   External ears normal  EYES: Pupils equal, sclera anicteric, midline, normal conjuctiva  NECK: No JVD, supple, no obvious masses or thryomegaly or goiter  CARDIOVASCULAR: RRR, No murmur, rub, gallops S1,S2  LUNGS: Clear To auscultation bilaterally, normal effort, no rales, rhonchi, crackles  ABDOMEN: Soft, nondistended, nontender, without obvious organomegaly or ascites  EXTREMITIES/VASCULAR: No edema  Radial pulses intact, pedal pulses difficult to palpate, warm an well perfused  PSYCH: Normal Affect, no overt suicidal ideation, linear speech pattern without evidence of psychosis  NEURO: Grossly intact, moving all extremiteis equal, face symmetric, alert and responsive, no obvious focal defecits  HEME: No bleeding, bruising, petechia, purpura  SKIN: No significant rashes, warm, no diaphoresis or pallor       Lab Results:     CBC with diff: Results from last 7 days   Lab Units 12/19/19  0954   WBC Thousand/uL 6 95   HEMOGLOBIN g/dL 14 5   HEMATOCRIT % 44 7   MCV fL 96   PLATELETS Thousands/uL 229   MCH pg 31 0   MCHC g/dL 32 4   RDW % 12 0   MPV fL 9 1   NRBC AUTO /100 WBCs 0         CMP:  Results from last 7 days   Lab Units 12/19/19  0954   POTASSIUM mmol/L 4 2   CHLORIDE mmol/L 110*   CO2 mmol/L 29   BUN mg/dL 23   CREATININE mg/dL 0 94   CALCIUM mg/dL 9 2   AST U/L 21   ALT U/L 28   ALK PHOS U/L 68   EGFR ml/min/1 73sq m 86         BMP:  Results from last 7 days   Lab Units 12/19/19  0954   POTASSIUM mmol/L 4 2   CHLORIDE mmol/L 110*   CO2 mmol/L 29   BUN mg/dL 23   CREATININE mg/dL 0 94   CALCIUM mg/dL 9 2       BNP:   Results Reviewed     Procedure Component Value Units Date/Time    TSH, 3rd generation with Free T4 reflex [[id number]]  (Normal) Collected:  12/19/19 1630    Lab Status:  Final result Specimen:  Blood from Arm, Left Updated:  12/19/19 1705     TSH 3RD GENERATON 0 838 uIU/mL     Narrative:       Patients undergoing fluorescein dye angiography may retain small amounts of fluorescein in the body for 48-72 hours post procedure  Samples containing fluorescein can produce falsely depressed TSH values  If the patient had this procedure,a specimen should be resubmitted post fluorescein clearance  Troponin I [[id number]]  (Normal) Collected:  12/19/19 1630    Lab Status:  Final result Specimen:  Blood from Arm, Left Updated:  12/19/19 1700     Troponin I <0 02 ng/mL     TSH, 3rd generation with Free T4 reflex [[id number]]  (Normal) Collected:  12/19/19 0954    Lab Status:  Final result Specimen:  Blood from Arm, Right Updated:  12/19/19 1401     TSH 3RD GENERATON 0 889 uIU/mL     Narrative:       Patients undergoing fluorescein dye angiography may retain small amounts of fluorescein in the body for 48-72 hours post procedure  Samples containing fluorescein can produce falsely depressed TSH values  If the patient had this procedure,a specimen should be resubmitted post fluorescein clearance        Troponin I [[id number]]  (Normal) Collected:  12/19/19 1331    Lab Status:  Final result Specimen:  Blood from Arm, Left Updated:  12/19/19 1356     Troponin I <0 02 ng/mL     Comprehensive metabolic panel [[id number]]  (Abnormal) Collected:  12/19/19 0954    Lab Status:  Final result Specimen:  Blood from Arm, Right Updated:  12/19/19 1031     Sodium 142 mmol/L      Potassium 4 2 mmol/L      Chloride 110 mmol/L      CO2 29 mmol/L      ANION GAP 3 mmol/L      BUN 23 mg/dL      Creatinine 0 94 mg/dL      Glucose 77 mg/dL      Calcium 9 2 mg/dL      AST 21 U/L      ALT 28 U/L      Alkaline Phosphatase 68 U/L      Total Protein 7 1 g/dL      Albumin 3 6 g/dL      Total Bilirubin 0 46 mg/dL      eGFR 86 ml/min/1 73sq m     Narrative:       [facility] guidelines for Chronic Kidney Disease (CKD):     Stage 1 with normal or high GFR (GFR > 90 mL/min/1 73 square meters)    Stage 2 Mild CKD (GFR = 60-89 mL/min/1 73 square meters)    Stage 3A Moderate CKD (GFR = 45-59 mL/min/1 73 square meters)    Stage 3B Moderate CKD (GFR = 30-44 mL/min/1 73 square meters)    Stage 4 Severe CKD (GFR = 15-29 mL/min/1 73 square meters)    Stage 5 End Stage CKD (GFR <15 mL/min/1 73 square meters)  Note: GFR calculation is accurate only with a steady state creatinine    Troponin I [[id number]]  (Abnormal) Collected:  19    Lab Status:  Final result Specimen:  Blood from Arm, Right Updated:  19 1031     Troponin I 0 05 ng/mL     CBC and differential [[id number]]  (Abnormal) Collected:  19    Lab Status:  Final result Specimen:  Blood from Arm, Right Updated:  19 1003     WBC 6 95 Thousand/uL      RBC 4 67 Million/uL      Hemoglobin 14 5 g/dL      Hematocrit 44 7 %      MCV 96 fL      MCH 31 0 pg      MCHC 32 4 g/dL      RDW 12 0 %      MPV 9 1 fL      Platelets 555 Thousands/uL      nRBC 0 /100 WBCs      Neutrophils Relative 59 %      Immat GRANS % 0 %      Lymphocytes Relative 26 %      Monocytes Relative 6 %      Eosinophils Relative 8 %      Basophils Relative 1 %      Neutrophils Absolute 4 12 Thousands/µL      Immature Grans Absolute 0 01 Thousand/uL      Lymphocytes Absolute 1 79 Thousands/µL      Monocytes Absolute 0 44 Thousand/µL      Eosinophils Absolute 0 54 Thousand/µL      Basophils Absolute 0 05 Thousands/µL         No results for input(s): BNP in the last 72 hours       Results from last 7 days   Lab Units 19  1630 19  1331 19  0954   TROPONIN I ng/mL <0 02 <0 02 0 05*         Magnesium:       Coags:       TSH:       Lipid Profile:         Cardiac testing:   Results for orders placed during the hospital encounter of 18   Echo complete with contrast if indicated    Narrative [address] [facility], [address] [facility]  [phone]    Transthoracic Echocardiogram  2D, M-mode, Doppler, and Color Doppler    Study date:  2018    Patient: Melani Roper  MR number: [id number]  Account number: [de-identified]  : [id number]  Age: 65 years  Gender: Male  Status: Outpatient  Location: [address] [place] [address]  Height: 72 in  Weight: 215 6 lb  BP: 124/ 80 mmHg    Indications: Premature ventricular contractions, First degree AV block    Diagnoses: I44 0 - Atrioventricular block, first degree, I49 3 - Ventricular premature depolarization    Sonographer:  Jaleel Sumner RDCS  Primary Physician:  Kerrie Mock MD  Referring Physician:  Jaja Reddy MD  Group:  [facility]  Interpreting Physician:  Danielle Borja MD    SUMMARY    LEFT VENTRICLE:  Systolic function was at the lower limits of normal  Ejection fraction was estimated to be 55 %  There were no regional wall motion abnormalities  MITRAL VALVE:  There was trace regurgitation  TRICUSPID VALVE:  There was mild regurgitation  AORTA:  The root exhibited mild dilatation  The aortic root diameter was 43 mm  HISTORY: PRIOR HISTORY: Premature ventricular contractions, First degree AV block    PROCEDURE: The study was performed in the [address] [place] [address]  This was a routine study  The transthoracic approach was used  The study included complete 2D imaging, M-mode, complete spectral Doppler, and color Doppler  Images were obtained from the parasternal, apical, subcostal, and suprasternal notch acoustic windows  This was a technically difficult study  LEFT VENTRICLE: Size was normal  Systolic function was at the lower limits of normal  Ejection fraction was estimated to be 55 %  There were no regional wall motion abnormalities  Wall thickness was normal  No evidence of apical thrombus  DOPPLER: Left ventricular diastolic function parameters were normal     RIGHT VENTRICLE: The size was normal  Systolic function was normal  Wall thickness was normal     LEFT ATRIUM: Size was normal     RIGHT ATRIUM: Size was normal     MITRAL VALVE: Valve structure was normal  There was normal leaflet separation  DOPPLER: The transmitral velocity was within the normal range  There was no evidence for stenosis  There was trace regurgitation  AORTIC VALVE: The valve was trileaflet  Leaflets exhibited normal thickness and normal cuspal separation  DOPPLER: Transaortic velocity was within the normal range  There was no evidence for stenosis  There was no significant  regurgitation  TRICUSPID VALVE: The valve structure was normal  There was normal leaflet separation  DOPPLER: The transtricuspid velocity was within the normal range  There was no evidence for stenosis  There was mild regurgitation  PULMONIC VALVE: Leaflets exhibited normal thickness, no calcification, and normal cuspal separation  DOPPLER: The transpulmonic velocity was within the normal range  There was no significant regurgitation  PERICARDIUM: There was no pericardial effusion  The pericardium was normal in appearance  AORTA: The root exhibited mild dilatation  SYSTEMIC VEINS: IVC: The inferior vena cava was normal in size  PULMONARY VEINS: DOPPLER: Doppler flow pattern was normal in the pulmonary vein(s)      MEASUREMENT TABLES    2D MEASUREMENTS  Aorta   (Reference normals)  Root diam   43 mm   (--)    SYSTEM MEASUREMENT TABLES    2D  %FS: 24 8 %  Ao Diam: 4 34 cm  Ao asc: 3 74 cm  EDV(Teich): 121 7 ml  EF Biplane: 57 35 %  EF(Teich): 48 88 %  ESV(Cube): 55 17 ml  ESV(Teich): 62 22 ml  IVSd: 0 94 cm  LA Area: 18 93 cm2  LA Diam: 3 79 cm  LVEDV MOD A2C: 136 12 ml  LVEDV MOD A4C: 120 96 ml  LVEDV MOD BP: 129 72 ml  LVEF MOD A2C: 58 1 %  LVEF MOD A4C: 56 23 %  LVESV MOD A2C: 57 04 ml  LVESV MOD A4C: 52 95 ml  LVESV MOD BP: 55 32 ml  LVIDd: 5 06 cm  LVIDs: 3 81 cm  LVLd A2C: 9 93 cm  LVLd A4C: 9 68 cm  LVLs A2C: 8 39 cm  LVLs A4C: 8 16 cm  LVPWd: 0 88 cm  RA Area: 18 24 cm2  RV Diam : 4 14 cm  SI(Cube): 33 9 ml/m2  SI(Teich): 27 04 ml/m2  SV MOD A2C: 79 08 ml  SV MOD A4C: 68 02 ml  SV(Cube): 74 57 ml  SV(Teich): 59 48 ml    CW  TR Vmax: 1 94 m/s  TR maxPG: 15 06 mmHg    MM  TAPSE: 2 43 cm    PW  AVC: 403 43 ms  E': 0 12 m/s  E/E': 5 58  MV A Johnny: 0 65 m/s  MV Dec Frontier: 2 46 m/s2  MV DecT: 271 53 ms  MV E Johnny: 0 67 m/s  MV E/A Ratio: 1 02    [facility] Acc[facility]    Prepared and electronically signed by    Rose Raza MD  Signed 23-Jul-2018 15:31:34       No results found for this or any previous visit  No results found for this or any previous visit  No results found for this or any previous visit  EKG/TELE: NSR first degree av block , otherwise normal  No ischemic St changes

## 2019-12-19 NOTE — CONSULTS
Consultation - Neurology   Swathi Miramontes 62 y o  male MRN: [id number]  Unit/Bed#: [facility] Encounter: [id number]      Assessment/Plan   Assessment:  57-yo male with PMHx of PVCs (s/p ablation winter 2018) presents after suffering a syncopal event  Pt has been feeling lightheaded for the last 1 - 1 5 years  He has lost 30 pounds in this time intentionally, and endorses fasting often  He occasionally has palpitations while exercising  Recommend EEG to evaluate for seizure disorder, but suspect syncope is cardiac in nature  Plan:  - check orthostatic vitals, recommend TEDs due to lightheadedness while standing  - recommend cardiology consult  - recommend EEG to evaluate cortical irritation  - recommend CTA head and neck  - recommend evaluating other causes of syncope including TSH and B12    History of Present Illness     Reason for Consult / Principal Problem: syncope  HPI: Swathi Miramontes is a 62 y o male with PMHx of PVCs s/p ablation and 1 episode of a seizure (when he was 24), who presents after suffering a syncopal episode  He got out of bed this morning, walked about 10 feet to the bathroom, and felt "woozy," at which point he grabbed the door frame to steady himself  He ended up falling back, and lost consciousness for about 20-30 seconds  He has no recollection of what occurred at this time, but this episode was witnessed by his wife, who reports no tongue biting, involuntary movements, or urinary incontinence  Pt states he was confused for a period of time after passing out  His wife, a former ER nurse, states he became pale and clammy  He had a similar episode a few months ago, at which time he also lost consciousness  The patient has been experiencing this "wooziness" for the last 1 - 1 5 years  He cannot think of any factors that trigger this feeling, which he describes as a "rush of blood" in his head   This feeling usually occurs while he is standing upright, but can occur at any time, including while seated  He usually responds to this feeling by taking deep breaths, which he states helps lessen the lightheadedness  He denies any increase in stress, medication changes, or changes in lifestyle during this time  He has lost about 30 pounds, intentionally, starting January 2019  His ablation was done in winter of 2018, and since then, pt reports experiencing occasional irregular beats, mainly while he is doing cardio exercise  The patient did mention his dietary habits and stated he likes to "fast " He usually only eats 1 meal a day, and sometimes will go 1-2 days without eating (does drink liquids)  He denies decreased appetite, nausea, or difficulty swallowing, states he "just enjoys eating this way," and has been doing this since 1980  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, 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  He has not had a seizure episode since then, and is not on any AEDs  Review of Systems   Constitutional: Negative for chills and fever  HENT: Negative for hearing loss and trouble swallowing  Eyes: Negative for visual disturbance  Respiratory: Negative for cough and shortness of breath  Cardiovascular: Positive for palpitations  Negative for chest pain  Gastrointestinal: Negative for abdominal pain, nausea and vomiting  Musculoskeletal: Positive for neck pain  Neurological: Positive for syncope and light-headedness  Negative for tremors, weakness and headaches  Historical Information   Past Medical History:   Diagnosis Date    Disease of thyroid gland      Past Surgical History:   Procedure Laterality Date    ABLATION OF DYSRHYTHMIC FOCUS       Social History   Social History     Substance and Sexual Activity   Alcohol Use Yes    Comment: rare     Social History     Substance and Sexual Activity   Drug Use No     Social History     Tobacco Use   Smoking Status Never Smoker   Smokeless Tobacco Never Used     Family History: History reviewed   No pertinent family history  Pt states that his mom and sister both have orthostatic hypotension? Meds/Allergies   current meds:   Current Facility-Administered Medications   Medication Dose Route Frequency    acetaminophen (TYLENOL) tablet 650 mg  650 mg Oral Q6H PRN    aspirin chewable tablet 81 mg  81 mg Oral Daily    cholecalciferol (VITAMIN D3) tablet 1,000 Units  1,000 Units Oral Daily    cyanocobalamin (VITAMIN B-12) tablet 750 mcg  750 mcg Oral Daily    enoxaparin (LOVENOX) subcutaneous injection 40 mg  40 mg Subcutaneous Daily    finasteride (PROSCAR) tablet 5 mg  5 mg Oral Daily    levothyroxine tablet 100 mcg  100 mcg Oral Early Morning    multivitamin stress formula tablet 1 tablet  1 tablet Oral Daily    tamsulosin (FLOMAX) capsule 0 4 mg  0 4 mg Oral Daily With Dinner    vitamin E (tocopherol) capsule 200 Units  200 Units Oral Daily    and PTA meds:   Prior to Admission Medications   Prescriptions Last Dose Informant Patient Reported? Taking?    Ascorbic Acid (VITAMIN C) 100 MG tablet  Self Yes No   Sig: Take 100 mg by mouth daily   Cholecalciferol 25 MCG (1000 UT) capsule   Yes No   Sig: Take 1,000 Units by mouth   FOLIC ACID PO  Self Yes No   Sig: Take 1 tablet by mouth Every 3-4 days   MAGNESIUM PO  Self Yes No   Sig: Take 1 tablet by mouth daily   aspirin 81 MG tablet  Self Yes No   Sig: Take 81 mg by mouth   b complex vitamins capsule  Self Yes No   Sig: Take 1 capsule by mouth daily   cholecalciferol (VITAMIN D3) 1,000 units tablet  Self Yes No   Sig: Take 1,000 Units by mouth daily   cyanocobalamin (VITAMIN B-12) 100 mcg tablet  Self Yes No   Sig: Take 750 mcg by mouth daily     finasteride (PROSCAR) 5 mg tablet  Self Yes No   Sig: TAKE 1 TABLET BY MOUTH DAILY   levothyroxine 100 mcg tablet  Self Yes No   Sig: TAKE 1 TABLET BY MOUTH DAILY   tamsulosin (FLOMAX) 0 4 mg  Self Yes No   Sig: Take 0 4 mg by mouth daily with dinner     vitamin E 100 UNIT capsule  Self Yes No   Sig: Take 100 Units by mouth daily      Facility-Administered Medications: None       Allergies   Allergen Reactions    Hydromorphone Hallucinations and Other (See Comments)     hallucinosis  hallucinosis  Patient saw things that were not there     Medical Tape      Itches        Objective   Vitals:Blood pressure 133/63, pulse 64, temperature 97 6 °F (36 4 °C), temperature source Oral, resp  rate 18, height 6' 1" (1 854 m), weight 88 5 kg (195 lb), SpO2 97 %  ,Body mass index is 25 73 kg/m²  No intake or output data in the 24 hours ending 12/19/19 1620    Invasive Devices: Invasive Devices     Peripheral Intravenous Line            Peripheral IV 12/19/19 Right Antecubital less than 1 day          Drain            External Urinary Catheter Medium 364 days              Physical Exam:   General: Pleasant male in no acute distress, appears his stated age  Head: normocephalic, atraumatic  Eyes: PERRL  Conjunctiva anicteric  Skin: warm, dry  Cardiac: RRR  Normal S1 and S2  No murmurs, rubs, or gallops     Neurologic exam:  Mental status: Awake and alert  Oriented to person, place, and time  Fluent speech with intact naming  Comprehension intact  Affect appropriate     Cranial nerves:  II: visual acuity intact, pupils 3 to 2 mm bilaterally, fields intact to confrontantion  III/IV/VI: EOMI with nystagmus, no gaze preference  V: facial sensation symmetric to light touch  VII: facial expression symmetric to smile, eyebrow elevation  VIII: hearing intact to voice and finger rub  IX/X: palate elevates symmetrically  XI: shoulder shrug symmetric  XII: tongue midline, not atrophic, no fasciculations     Motor: Bulk, tone, and strength were normal throughout  Pronator drift was absent  No abnormal movements     Sensation: Sensation to light touch, temperature, vibration, pinprick intact      Coordination: Finger-to-nose and heel-to-shin intact  No dysmetria      Reflexes: 2+ throughout the upper and lower extremities   Negative Babinski bilaterally      Gait: Normal gait  Absent Romberg       Lab Results:     Troponin downtrending (1st was 0 05, 2nd was <0 02)    CBC:   Results from last 7 days   Lab Units 12/19/19  0954   WBC Thousand/uL 6 95   RBC Million/uL 4 67   HEMOGLOBIN g/dL 14 5   HEMATOCRIT % 44 7   MCV fL 96   PLATELETS Thousands/uL 229   , BMP/CMP:   Results from last 7 days   Lab Units 12/19/19  0954   SODIUM mmol/L 142   POTASSIUM mmol/L 4 2   CHLORIDE mmol/L 110*   CO2 mmol/L 29   BUN mg/dL 23   CREATININE mg/dL 0 94   CALCIUM mg/dL 9 2   AST U/L 21   ALT U/L 28   ALK PHOS U/L 68   EGFR ml/min/1 73sq m 86   , Vitamin B12:   , HgBA1C:   , TSH:   Results from last 7 days   Lab Units 12/19/19  0954   TSH 3RD GENERATON uIU/mL 0 889     Imaging Studies:   CXR: no acute cardiopulmonary disease    EKG, Pathology, and Other Studies:   EKG: NSR w/ first-degree AV block

## 2019-12-19 NOTE — H&P
H&P- Swathi Miramontes 1955, 62 y o  male MRN: [id number]    Unit/Bed#: [facility] Encounter: [id number]    Primary Care Provider: Mushtaq Mendoza MD   Date and time admitted to hospital: 12/19/2019  9:35 AM        * Syncope  Assessment & Plan  Likely vasovagal in setting of orthostasis  Will check orthostatics  Due to pt being symptomatic with standing, will order him TEDs  Hx of ablation - EP consult  EP also consulted neuro    Hypothyroid  Assessment & Plan  Recent TSH WNL  C/w Synthroid    Elevated troponin  Assessment & Plan  Check echo  Trend trops  No CP and low risk - likely related to syncope  Check FLP and A1C    Frequent PVCs  Assessment & Plan  S/p ablation  EP consulted  Keep K at 4 and Mag at 2    VTE Prophylaxis: Enoxaparin (Lovenox)  / sequential compression device   Code Status: Full  POLST: POLST form is not discussed and not completed at this time  Discussion with family: yes    Anticipated Length of Stay:  Patient will be admitted on an Observation basis with an anticipated length of stay of  Less than 2 midnights  Justification for Hospital Stay: need to monitor on tele    Total Time for Visit, including Counseling / Coordination of Care: 45 minutes  Greater than 50% of this total time spent on direct patient counseling and coordination of care  Chief Complaint:   dizziness    History of Present Illness:    Swathi Miramontes is a 62 y o  male w/ frequent PVCs s/p ablation last year who presents with episode of syncope this AM   Went to bathroom to have BM and then when he got up he had brief LOC  Witnessed by wife  Pt admits that he does get lightheaded if he gets up too quickly from sitting position  Usually takes time getting up from bed  Also says sometimes sitting he gets lightheaded  Also gets lightheaded taking a deep breath  No recent viral illness  Eating and drinking well  Says in past he was dx w/ orthostasis  No f/c/cp/sob/n/v/d      Review of Systems:    Review of Systems   Constitutional: Negative  HENT: Negative  Eyes: Negative  Respiratory: Negative  Cardiovascular: Negative  Gastrointestinal: Negative  Endocrine: Negative  Genitourinary: Negative  Musculoskeletal: Negative  Allergic/Immunologic: Negative  Neurological: Positive for dizziness and syncope  Hematological: Negative  Psychiatric/Behavioral: Negative  Past Medical and Surgical History:     Past Medical History:   Diagnosis Date    Disease of thyroid gland        Past Surgical History:   Procedure Laterality Date    ABLATION OF DYSRHYTHMIC FOCUS         Meds/Allergies:    Prior to Admission medications    Medication Sig Start Date End Date Taking? Authorizing Provider   Ascorbic Acid (VITAMIN C) 100 MG tablet Take 100 mg by mouth daily    Historical Provider, MD   aspirin 81 MG tablet Take 81 mg by mouth    Historical Provider, MD   b complex vitamins capsule Take 1 capsule by mouth daily    Historical Provider, MD   cholecalciferol (VITAMIN D3) 1,000 units tablet Take 1,000 Units by mouth daily    Historical Provider, MD   cyanocobalamin (VITAMIN B-12) 100 mcg tablet Take 750 mcg by mouth daily      Historical Provider, MD   finasteride (PROSCAR) 5 mg tablet TAKE 1 TABLET BY MOUTH DAILY 12/21/17   Historical Provider, MD   FOLIC ACID PO Take 1 tablet by mouth Every 3-4 days    Historical Provider, MD   levothyroxine 100 mcg tablet TAKE 1 TABLET BY MOUTH DAILY 4/24/18   Historical Provider, MD   MAGNESIUM PO Take 1 tablet by mouth daily    Historical Provider, MD   tamsulosin (FLOMAX) 0 4 mg Take 0 4 mg by mouth daily with dinner   7/11/17   Historical Provider, MD   vitamin E 100 UNIT capsule Take 100 Units by mouth daily    Historical Provider, MD     I have reviewed home medications using allscripts  Allergies:    Allergies   Allergen Reactions    Hydromorphone Hallucinations and Other (See Comments)     hallucinosis  hallucinosis  Patient saw things that were not there     Medical Tape      Itches        Social History:     Marital Status: /Civil Union     Substance Use History:   Social History     Substance and Sexual Activity   Alcohol Use Yes    Comment: rare     Social History     Tobacco Use   Smoking Status Never Smoker   Smokeless Tobacco Never Used     Social History     Substance and Sexual Activity   Drug Use No       Family History:    History reviewed  No pertinent family history  Physical Exam:     Vitals:   Blood Pressure: 133/63 (12/19/19 0944)  Pulse: 64 (12/19/19 0944)  Temperature: 97 6 °F (36 4 °C) (12/19/19 1022)  Temp Source: Oral (12/19/19 1022)  Respirations: 18 (12/19/19 0944)  Height: 6' 1" (185 4 cm) (12/19/19 0944)  Weight - Scale: 88 5 kg (195 lb) (12/19/19 0944)  SpO2: 97 % (12/19/19 0944)    Physical Exam   Constitutional: He is oriented to person, place, and time  No distress  HENT:   Head: Normocephalic and atraumatic  Eyes: Conjunctivae and EOM are normal    Neck: Normal range of motion  Neck supple  Cardiovascular: Normal rate and regular rhythm  Pulmonary/Chest: Effort normal and breath sounds normal  He has no wheezes  He has no rales  Abdominal: Soft  Bowel sounds are normal  He exhibits no distension  There is no tenderness  Musculoskeletal: Normal range of motion  He exhibits no edema  Neurological: He is alert and oriented to person, place, and time  No cranial nerve deficit  Skin: Skin is warm and dry  He is not diaphoretic  Additional Data:     Lab Results: I have personally reviewed pertinent reports        Results from last 7 days   Lab Units 12/19/19  0954   WBC Thousand/uL 6 95   HEMOGLOBIN g/dL 14 5   HEMATOCRIT % 44 7   PLATELETS Thousands/uL 229   NEUTROS PCT % 59   LYMPHS PCT % 26   MONOS PCT % 6   EOS PCT % 8*     Results from last 7 days   Lab Units 12/19/19  0954   SODIUM mmol/L 142   POTASSIUM mmol/L 4 2   CHLORIDE mmol/L 110*   CO2 mmol/L 29   BUN mg/dL 23   CREATININE mg/dL 0 94 ANION GAP mmol/L 3*   CALCIUM mg/dL 9 2   ALBUMIN g/dL 3 6   TOTAL BILIRUBIN mg/dL 0 46   ALK PHOS U/L 68   ALT U/L 28   AST U/L 21   GLUCOSE RANDOM mg/dL 77                       Imaging: I have personally reviewed pertinent reports  XR chest 2 views   Final Result by Jericho Caldwell DO (12/19 1115)      No acute cardiopulmonary disease  Workstation performed: [id number]             EKG, Pathology, and Other Studies Reviewed on Admission:   · EKG: NSR w/ 1 def AVB    Allscripts / Epic Records Reviewed: Yes     ** Please Note: This note has been constructed using a voice recognition system   **

## 2019-12-19 NOTE — ED PROVIDER NOTES
Emergency Department Note- Manda Keller 62 y o  male MRN: [id number]    Unit/Bed#: [facility] Encounter: [id number]        History of Present Illness   HPI:  Manda Keller is a 62 y o  male who presents with syncope  Patient states he got up from bed and does not recall exactly what happened but his wife who is a nurse states that he became pale diaphoretic and passed out with no seizure-like activity  Patient's wife stated that the episode lasted 20-30 seconds and the patient states he remembers going into the bathroom and then his wife standing over him  Patient states he has had 1 previous syncopal episode  Patient currently still feels a little lightheaded but no vertigo symptoms  No headache no chest pain no shortness of breath no nausea vomiting no current diaphoresis  No numbness tingling or weakness in his extremities  Patient does have a history of PVCs and had an ablation done  Patient also states there was concern for lymphoma but he recently had bone marrow biopsy which was negative for any cancer  Patient denies drinking or smoking  Denies any other drugs  REVIEW OF SYSTEMS    Constitutional: Negative for chills, fatigue and fever  HENT: Negative for ear pain, sore throat and trouble swallowing  Eyes: Negative for photophobia, pain and visual disturbance  Respiratory: Negative for cough, chest tightness and positive shortness of breath  Cardiovascular: Negative for chest pain and palpitations  Gastrointestinal: Negative for abdominal pain, constipation, diarrhea, nausea and vomiting  Genitourinary: Negative for dysuria, flank pain, frequency and hematuria  Musculoskeletal: Negative for back pain and neck pain  Skin: Negative for color change and rash  Neurological: Negative for dizziness, weakness, positive light-headedness and negative headaches  Psychiatric/Behavioral: Negative for confusion  The patient is not nervous/anxious      All systems reviewed and negative except as noted above or in HPI         Historical Information   History reviewed  No pertinent past medical history  History reviewed  No pertinent surgical history  Social History   Social History     Substance and Sexual Activity   Alcohol Use Yes    Comment: rare     Social History     Substance and Sexual Activity   Drug Use No     Social History     Tobacco Use   Smoking Status Never Smoker   Smokeless Tobacco Never Used     Family History: History reviewed  No pertinent family history  Meds/Allergies     (Not in a hospital admission)  Allergies   Allergen Reactions    Hydromorphone Hallucinations and Other (See Comments)     hallucinosis  hallucinosis  Patient saw things that were not there     Medical Tape      Itches        Objective   Vitals: Blood pressure 133/63, pulse 64, resp  rate 18, weight 88 5 kg (195 lb), SpO2 97 %  PHYSICAL EXAM     General Appearance: alert and oriented, nad, non toxic appearing  Skin:  Warm, dry, intact  HEENT: atraumatic, normocephalic, eomi, perll   Neck: Supple, no JVD, no lymphadenopathy, trachea midline, no bruit  Cardiac: rrr, no murmurs, rub, gallops  Pulmonary: lungs cta, no wheezes, rales, rhonchi  Gastrointestinal: abdomen soft nontender, good bs, no mass or bruits, no cva tenderness  Extremities: no pedal edema, good pulses, no calf tenderness, no clubbing, no cyanosis  Neuro:  no focal motor or sensory deficits, cn intact  Psych:  Normal mood and affect, normal judgement and insight      Lab Results: Lab Results: I have personally reviewed pertinent lab results  Imaging: I have personally reviewed pertinent reports  EKG, Pathology, and Other Studies: I have personally reviewed pertinent films in PACS    Assessment/Plan     ED Medical Decision Making:  I offered the patient admission for his syncope and told him he would be monitored on telemetry and he would likely get an echo and carotid Dopplers  Patient was in agreement with this plan      ECG 12 Lead Documentation  Date/Time: today/date: 12/19/2019  Performed by: Dio Navarrete  Authorized by: Dio Navarrete     ECG reviewed by me, the ED Provider: yes    Patient location:  ED   Previous ECG:  Compared to current, no change   Rate:  ECG rate assessment: normal    Rhythm: sinus rhythm    Ectopy:  : none    QRS axis:  Normal  Intervals: normal   Q waves: None   ST segments:  Normal  T waves: normal      Impression:  First-degree AV block  Portions of the record may have been created with voice recognition software  Occasional wrong word or "sound a like" substitutions may have occurred due to the inherent limitations of voice recognition software  Read the chart carefully and recognize, using context, where substitutions have occurred         Reynaldo Ontiveros MD  12/19/19 [id number]

## 2019-12-19 NOTE — ASSESSMENT & PLAN NOTE
Likely vasovagal in setting of orthostasis  Will check orthostatics  Due to pt being symptomatic with standing, will order him TEDs  Hx of ablation - EP consult  EP also consulted neuro

## 2019-12-19 NOTE — PLAN OF CARE
Problem: Potential for Falls  Goal: Patient will remain free of falls  Description  INTERVENTIONS:  - Assess patient frequently for physical needs  -  Identify cognitive and physical deficits and behaviors that affect risk of falls  -  [place] fall precautions as indicated by assessment   - Educate patient/family on patient safety including physical limitations  - Instruct patient to call for assistance with activity based on assessment  - Modify environment to reduce risk of injury  - Consider OT/PT consult to assist with strengthening/mobility  Outcome: Progressing     Problem: PAIN - ADULT  Goal: Verbalizes/displays adequate comfort level or baseline comfort level  Description  Interventions:  - Encourage patient to monitor pain and request assistance  - Assess pain using appropriate pain scale  - Administer analgesics based on type and severity of pain and evaluate response  - Implement non-pharmacological measures as appropriate and evaluate response  - Notify physician/advanced practitioner if interventions unsuccessful or patient reports new pain   Outcome: Progressing     Problem: INFECTION - ADULT  Goal: Absence or prevention of progression during hospitalization  Description  INTERVENTIONS:  - Assess and monitor for signs and symptoms of infection  - Monitor lab/diagnostic results  - Monitor all insertion sites, i e  indwelling lines, tubes, and drains  - Administer medications as ordered  - Instruct and encourage patient and family to use good hand hygiene technique  - Identify and instruct in appropriate isolation precautions for identified infection/condition   Outcome: Progressing     Problem: SAFETY ADULT  Goal: Patient will remain free of falls  Description  INTERVENTIONS:  - Assess patient frequently for physical needs  -  Identify cognitive and physical deficits and behaviors that affect risk of falls    -  [place] fall precautions as indicated by assessment   - Educate patient/family on patient safety including physical limitations  - Instruct patient to call for assistance with activity based on assessment  - Modify environment to reduce risk of injury  - Consider OT/PT consult to assist with strengthening/mobility  Outcome: Progressing     Problem: DISCHARGE PLANNING  Goal: Discharge to home or other facility with appropriate resources  Description  INTERVENTIONS:  - Identify barriers to discharge w/patient and caregiver  - Arrange for needed discharge resources and transportation as appropriate  - Identify discharge learning needs (meds, wound care, etc )  - Arrange for interpretive services to assist at discharge as needed  - Refer to Case Management Department for coordinating discharge planning if the patient needs post-hospital services based on physician/advanced practitioner order or complex needs related to functional status, cognitive ability, or social support system  Outcome: Progressing     Problem: Knowledge Deficit  Goal: Patient/family/caregiver demonstrates understanding of disease process, treatment plan, medications, and discharge instructions  Description  Complete learning assessment and assess knowledge base    Interventions:  - Provide teaching at level of understanding  - Provide teaching via preferred learning methods  Outcome: Progressing     Problem: NEUROSENSORY - ADULT  Goal: Achieves stable or improved neurological status  Description  INTERVENTIONS  - Monitor and report changes in neurological status  - Monitor vital signs such as temperature, blood pressure, glucose, and any other labs ordered   - Monitor for seizure activity and implement precautions if appropriate       Outcome: Progressing     Problem: CARDIOVASCULAR - ADULT  Goal: Maintains optimal cardiac output and hemodynamic stability  Description  INTERVENTIONS:  - Monitor I/O, vital signs and rhythm  - Monitor for S/S and trends of decreased cardiac output  - Administer and titrate ordered vasoactive medications to optimize hemodynamic stability  - Assess quality of pulses, skin color and temperature  - Assess for signs of decreased coronary artery perfusion  - Instruct patient to report change in severity of symptoms  Outcome: Progressing

## 2019-12-20 ENCOUNTER — TELEPHONE (OUTPATIENT)
Dept: CARDIOLOGY CLINIC | Facility: CLINIC | Age: 64
End: 2019-12-20

## 2019-12-20 ENCOUNTER — APPOINTMENT (OUTPATIENT)
Dept: NON INVASIVE DIAGNOSTICS | Facility: HOSPITAL | Age: 64
End: 2019-12-20
Payer: COMMERCIAL

## 2019-12-20 ENCOUNTER — APPOINTMENT (OUTPATIENT)
Dept: NEUROLOGY | Facility: CLINIC | Age: 64
End: 2019-12-20
Payer: COMMERCIAL

## 2019-12-20 VITALS
HEIGHT: 72 IN | TEMPERATURE: 97.6 F | HEART RATE: 85 BPM | BODY MASS INDEX: 26.28 KG/M2 | OXYGEN SATURATION: 96 % | SYSTOLIC BLOOD PRESSURE: 97 MMHG | WEIGHT: 194 LBS | DIASTOLIC BLOOD PRESSURE: 64 MMHG | RESPIRATION RATE: 20 BRPM

## 2019-12-20 DIAGNOSIS — R55 VASOVAGAL SYNCOPE: Primary | ICD-10-CM

## 2019-12-20 PROBLEM — R77.8 ELEVATED TROPONIN: Status: RESOLVED | Noted: 2019-12-19 | Resolved: 2019-12-20

## 2019-12-20 PROBLEM — R79.89 ELEVATED TROPONIN: Status: RESOLVED | Noted: 2019-12-19 | Resolved: 2019-12-20

## 2019-12-20 LAB
ANION GAP SERPL CALCULATED.3IONS-SCNC: 4 MMOL/L (ref 4–13)
BUN SERPL-MCNC: 17 MG/DL (ref 5–25)
CALCIUM SERPL-MCNC: 9.3 MG/DL (ref 8.3–10.1)
CHLORIDE SERPL-SCNC: 112 MMOL/L (ref 100–108)
CHOLEST SERPL-MCNC: 188 MG/DL (ref 50–200)
CO2 SERPL-SCNC: 27 MMOL/L (ref 21–32)
CREAT SERPL-MCNC: 0.86 MG/DL (ref 0.6–1.3)
ERYTHROCYTE [DISTWIDTH] IN BLOOD BY AUTOMATED COUNT: 12.2 % (ref 11.6–15.1)
EST. AVERAGE GLUCOSE BLD GHB EST-MCNC: 100 MG/DL
GFR SERPL CREATININE-BSD FRML MDRD: 92 ML/MIN/1.73SQ M
GLUCOSE SERPL-MCNC: 85 MG/DL (ref 65–140)
HBA1C MFR BLD: 5.1 % (ref 4.2–6.3)
HCT VFR BLD AUTO: 44.3 % (ref 36.5–49.3)
HDLC SERPL-MCNC: 63 MG/DL
HGB BLD-MCNC: 14.4 G/DL (ref 12–17)
LDLC SERPL CALC-MCNC: 113 MG/DL (ref 0–100)
MAGNESIUM SERPL-MCNC: 2.4 MG/DL (ref 1.6–2.6)
MCH RBC QN AUTO: 31.4 PG (ref 26.8–34.3)
MCHC RBC AUTO-ENTMCNC: 32.5 G/DL (ref 31.4–37.4)
MCV RBC AUTO: 97 FL (ref 82–98)
PLATELET # BLD AUTO: 233 THOUSANDS/UL (ref 149–390)
PMV BLD AUTO: 9.4 FL (ref 8.9–12.7)
POTASSIUM SERPL-SCNC: 4.2 MMOL/L (ref 3.5–5.3)
RBC # BLD AUTO: 4.58 MILLION/UL (ref 3.88–5.62)
SODIUM SERPL-SCNC: 143 MMOL/L (ref 136–145)
TRIGL SERPL-MCNC: 62 MG/DL
VIT B12 SERPL-MCNC: 1060 PG/ML (ref 100–900)
WBC # BLD AUTO: 6.48 THOUSAND/UL (ref 4.31–10.16)

## 2019-12-20 PROCEDURE — 80048 BASIC METABOLIC PNL TOTAL CA: CPT | Performed by: GENERAL PRACTICE

## 2019-12-20 PROCEDURE — 93306 TTE W/DOPPLER COMPLETE: CPT | Performed by: INTERNAL MEDICINE

## 2019-12-20 PROCEDURE — 83735 ASSAY OF MAGNESIUM: CPT | Performed by: GENERAL PRACTICE

## 2019-12-20 PROCEDURE — 99225 PR SBSQ OBSERVATION CARE/DAY 25 MINUTES: CPT | Performed by: PSYCHIATRY & NEUROLOGY

## 2019-12-20 PROCEDURE — 83036 HEMOGLOBIN GLYCOSYLATED A1C: CPT | Performed by: GENERAL PRACTICE

## 2019-12-20 PROCEDURE — 99217 PR OBSERVATION CARE DISCHARGE MANAGEMENT: CPT | Performed by: NURSE PRACTITIONER

## 2019-12-20 PROCEDURE — 95819 EEG AWAKE AND ASLEEP: CPT | Performed by: PSYCHIATRY & NEUROLOGY

## 2019-12-20 PROCEDURE — 82607 VITAMIN B-12: CPT | Performed by: PSYCHIATRY & NEUROLOGY

## 2019-12-20 PROCEDURE — 80061 LIPID PANEL: CPT | Performed by: GENERAL PRACTICE

## 2019-12-20 PROCEDURE — 85027 COMPLETE CBC AUTOMATED: CPT | Performed by: GENERAL PRACTICE

## 2019-12-20 PROCEDURE — 95816 EEG AWAKE AND DROWSY: CPT

## 2019-12-20 PROCEDURE — 93306 TTE W/DOPPLER COMPLETE: CPT

## 2019-12-20 PROCEDURE — NC001 PR NO CHARGE: Performed by: PSYCHIATRY & NEUROLOGY

## 2019-12-20 RX ADMIN — Medication 200 UNITS: at 10:35

## 2019-12-20 RX ADMIN — FINASTERIDE 5 MG: 5 TABLET, FILM COATED ORAL at 17:15

## 2019-12-20 RX ADMIN — MELATONIN 1000 UNITS: at 08:21

## 2019-12-20 RX ADMIN — CYANOCOBALAMIN TAB 500 MCG 750 MCG: 500 TAB at 08:21

## 2019-12-20 RX ADMIN — LEVOTHYROXINE SODIUM 100 MCG: 100 TABLET ORAL at 06:41

## 2019-12-20 RX ADMIN — ASPIRIN 81 MG 81 MG: 81 TABLET ORAL at 08:21

## 2019-12-20 RX ADMIN — TAMSULOSIN HYDROCHLORIDE 0.4 MG: 0.4 CAPSULE ORAL at 17:15

## 2019-12-20 RX ADMIN — ENOXAPARIN SODIUM 40 MG: 40 INJECTION SUBCUTANEOUS at 08:22

## 2019-12-20 RX ADMIN — ZINC 1 TABLET: TAB ORAL at 10:35

## 2019-12-20 NOTE — PLAN OF CARE
Problem: Potential for Falls  Goal: Patient will remain free of falls  Description  INTERVENTIONS:  - Assess patient frequently for physical needs  -  Identify cognitive and physical deficits and behaviors that affect risk of falls  -  [place] fall precautions as indicated by assessment   - Educate patient/family on patient safety including physical limitations  - Instruct patient to call for assistance with activity based on assessment  - Modify environment to reduce risk of injury  - Consider OT/PT consult to assist with strengthening/mobility  Outcome: Completed     Problem: PAIN - ADULT  Goal: Verbalizes/displays adequate comfort level or baseline comfort level  Description  Interventions:  - Encourage patient to monitor pain and request assistance  - Assess pain using appropriate pain scale  - Administer analgesics based on type and severity of pain and evaluate response  - Implement non-pharmacological measures as appropriate and evaluate response  - Notify physician/advanced practitioner if interventions unsuccessful or patient reports new pain   Outcome: Completed     Problem: INFECTION - ADULT  Goal: Absence or prevention of progression during hospitalization  Description  INTERVENTIONS:  - Assess and monitor for signs and symptoms of infection  - Monitor lab/diagnostic results  - Monitor all insertion sites, i e  indwelling lines, tubes, and drains  - Administer medications as ordered  - Instruct and encourage patient and family to use good hand hygiene technique  - Identify and instruct in appropriate isolation precautions for identified infection/condition   Outcome: Completed     Problem: SAFETY ADULT  Goal: Patient will remain free of falls  Description  INTERVENTIONS:  - Assess patient frequently for physical needs  -  Identify cognitive and physical deficits and behaviors that affect risk of falls    -  [place] fall precautions as indicated by assessment   - Educate patient/family on patient safety including physical limitations  - Instruct patient to call for assistance with activity based on assessment  - Modify environment to reduce risk of injury  - Consider OT/PT consult to assist with strengthening/mobility  Outcome: Completed     Problem: DISCHARGE PLANNING  Goal: Discharge to home or other facility with appropriate resources  Description  INTERVENTIONS:  - Identify barriers to discharge w/patient and caregiver  - Arrange for needed discharge resources and transportation as appropriate  - Identify discharge learning needs (meds, wound care, etc )  - Arrange for interpretive services to assist at discharge as needed  - Refer to Case Management Department for coordinating discharge planning if the patient needs post-hospital services based on physician/advanced practitioner order or complex needs related to functional status, cognitive ability, or social support system  Outcome: Completed     Problem: Knowledge Deficit  Goal: Patient/family/caregiver demonstrates understanding of disease process, treatment plan, medications, and discharge instructions  Description  Complete learning assessment and assess knowledge base    Interventions:  - Provide teaching at level of understanding  - Provide teaching via preferred learning methods  Outcome: Completed     Problem: NEUROSENSORY - ADULT  Goal: Achieves stable or improved neurological status  Description  INTERVENTIONS  - Monitor and report changes in neurological status  - Monitor vital signs such as temperature, blood pressure, glucose, and any other labs ordered   - Monitor for seizure activity and implement precautions if appropriate       Outcome: Completed     Problem: CARDIOVASCULAR - ADULT  Goal: Maintains optimal cardiac output and hemodynamic stability  Description  INTERVENTIONS:  - Monitor I/O, vital signs and rhythm  - Monitor for S/S and trends of decreased cardiac output  - Administer and titrate ordered vasoactive medications to optimize hemodynamic stability  - Assess quality of pulses, skin color and temperature  - Assess for signs of decreased coronary artery perfusion  - Instruct patient to report change in severity of symptoms  Outcome: Completed

## 2019-12-20 NOTE — DISCHARGE INSTRUCTIONS
PLEASE CALL TO SCHEDULE FOLLOW UP WITH YOUR PRIMARY CARE DOCTOR   - HOLD YOUR FLOMAX FOR THE NEXT WEEK TO SEE HOW YOU ARE FEELING , FOLLOW UP WITH UROLOGY   - PLEASE WEAR WILI STOCKINGS DAILY PUT ON A LITTLE BEFORE GETTING UP TO MOVE AROUND  - HYDRATE WELL AND DECREASE CAFFEINE INTAKE           Bradycardia   WHAT YOU NEED TO KNOW:   Bradycardia is a slow heart rate of fewer than 60 beats per minute  A slow heart rate is normal for some people, such as athletes, and needs no treatment  Bradycardia may also be caused by health conditions that do need treatment  DISCHARGE INSTRUCTIONS:   Follow up with your healthcare provider or cardiologist as directed:  Write down your questions so you remember to ask them during your visits  You may need to see specialists for more treatment  If you have a pacemaker, your cardiologist needs to make sure that it is working as it should  Heart monitoring at home: You may need to use a heart monitor at home to provide more information about your condition  This device is also called a Holter monitor, event monitor, or mobile telemetry  Bring your monitor with you to follow-up visits with your healthcare provider or cardiologist  Ask for more information about the Holter monitor  For more information:   · [address]   [place] , [place]   Phone: [phone]  Web Address: [email or web address]  [email or web address]   Contact your healthcare provider or cardiologist if:   · You are more tired than usual, even with treatment  · You have questions or concerns about your condition or care  Seek care immediately or call 911 if:   · You feel lightheaded or faint  · You have new or worsening dizziness, shortness of breath, chest pain, or confusion  · Your pulse rate is lower than healthcare providers say it should be, even with treatment    © 2017 Ilan [employer] Information is for End User's use only and may not be sold, redistributed or otherwise used for commercial purposes  All illustrations and images included in CareNotes® are the copyrighted property of A D A M , [employer]  or Melecio Rhodes  The above information is an  only  It is not intended as medical advice for individual conditions or treatments  Talk to your doctor, nurse or pharmacist before following any medical regimen to see if it is safe and effective for you  Tamsulosin (By mouth)   Tamsulosin Hydrochloride (isu-BJI-eae-sin mandeep-droe-KLOR-hood)  Treats benign prostatic hyperplasia (enlarged prostate)  Brand Name(s): Flomax   There may be other brand names for this medicine  When This Medicine Should Not Be Used: This medicine is not right for everyone  Do not use it if you had an allergic reaction to tamsulosin  How to Use This Medicine:   Capsule  · Take your medicine as directed  Your dose may need to be changed several times to find what works best for you  · Take this medicine 30 minutes after the same meal each day  Swallow the capsule whole  Do not crush, chew, or open it  · Read and follow the patient instructions that come with this medicine  Talk to your doctor or pharmacist if you have any questions  · Missed dose: Take a dose as soon as you remember  If it is almost time for your next dose, wait until then and take a regular dose  Do not take extra medicine to make up for a missed dose  If you forget to take this medicine for several days in a row, talk to your doctor before you start taking it again  · Store the medicine in a closed container at room temperature, away from heat, moisture, and direct light  Drugs and Foods to Avoid:   Ask your doctor or pharmacist before using any other medicine, including over-the-counter medicines, vitamins, and herbal products  · Some medicines can affect how tamsulosin works   Tell your doctor if you are using another alpha blocker medicine, cimetidine, erythromycin, ketoconazole, paroxetine, terbinafine, warfarin, or medicine for erectile dysfunction  Warnings While Using This Medicine:   · Tell your doctor if you have kidney disease, liver disease, low blood pressure, prostate cancer, or an allergy to sulfa drugs  · Tell your doctor if you plan to have cataract or glaucoma surgery  This medicine may cause eye problems during surgery  · This medicine may make you dizzy or lightheaded  Do not drive or do anything else that could be dangerous until you know how this medicine affects you  Stand or sit up slowly if you feel dizzy  · Your doctor will check your progress and the effects of this medicine at regular visits  Keep all appointments  · Keep all medicine out of the reach of children  Never share your medicine with anyone  Possible Side Effects While Using This Medicine:   Call your doctor right away if you notice any of these side effects:  · Allergic reaction: Itching or hives, swelling in your face or hands, swelling or tingling in your mouth or throat, chest tightness, trouble breathing  · Blistering, peeling, red skin rash  · Lightheadedness, dizziness, fainting  · Painful, prolonged erection of your penis  If you notice these less serious side effects, talk with your doctor:   · Headache  · Problems with ejaculation  · Runny or stuffy nose  If you notice other side effects that you think are caused by this medicine, tell your doctor  Call your doctor for medical advice about side effects  You may report side effects to FDA at 3-759-FDA-4348  © 2017 [address] [employer] Information is for End User's use only and may not be sold, redistributed or otherwise used for commercial purposes  The above information is an  only  It is not intended as medical advice for individual conditions or treatments  Talk to your doctor, nurse or pharmacist before following any medical regimen to see if it is safe and effective for you            Near Syncope   WHAT YOU NEED TO KNOW:   Near syncope, also called presyncope, is the feeling that you may faint (lose consciousness), but you do not  You can control some health conditions that cause near syncope  Your healthcare providers can help you create a plan to manage near syncope and prevent episodes  DISCHARGE INSTRUCTIONS:   Seek care immediately if:   · You have sudden chest pain  · You have trouble breathing or shortness of breath  · You have vision changes, are sweating, and have nausea while you are sitting or lying down  · You feel dizzy or flushed and your heart is fluttering  · You lose consciousness  · You cannot use your arm, hand, foot, or leg, or it feels weak  · You have trouble speaking or understanding others when they speak  Contact your healthcare provider if:   · You have new or worsening symptoms  · Your heart beats faster or slower than usual      · You have questions or concerns about your condition or care  Follow up with your healthcare provider as directed: You may need more tests to help find the cause of your near syncope episodes  The tests will help healthcare providers plan the best treatment for you  Write down your questions so you remember to ask them during your visits  Manage near syncope:   · Sit or lie down when needed  This includes when you feel dizzy, your throat is getting tight, and your vision changes  Raise your legs above the level of your heart  · Take slow, deep breaths if you start to breathe faster with anxiety or fear  This can help decrease dizziness and the feeling that you might faint  · Keep a record of your near syncope episodes  Include your symptoms and your activity before and after the episode  The record can help your healthcare provider find the cause of your near syncope and help you manage episodes  Prevent a near syncope episode:   · Move slowly and let yourself get used to one position before you move to another position    This is very important when you change from a lying or sitting position to a standing position  Take some deep breaths before you stand up from a lying position  Stand up slowly  Sudden movements may cause a fainting spell  Sit on the side of the bed or couch for a few minutes before you stand up  If you are on bedrest, try to be upright for about 2 hours each day, or as directed  Do not lock your legs if you are standing for a long period of time  Move your legs and bend your knees to keep blood flowing  · Follow your healthcare provider's recommendations  Your provider may  recommend that you drink more liquids to prevent dehydration  You may also need to have more salt to keep your blood pressure from dropping too low and causing syncope  Your provider will tell you how much liquid and sodium to have each day  · Watch for signs of low blood sugar  These include hunger, nervousness, sweating, and fast or fluttery heartbeats  Talk with your healthcare provider about ways to keep your blood sugar level steady  · Check your blood pressure often  This is important if you take medicine to lower your blood pressure  Check your blood pressure when you are lying down and when you are standing  Ask how often to check during the day  Keep a record of your blood pressure numbers  Your healthcare provider may use the record to help plan your treatment  · Do not strain if you are constipated  You may faint if you strain to have a bowel movement  Walking is the best way to get your bowels moving  Eat foods high in fiber to make it easier to have a bowel movement  Good examples are high-fiber cereals, beans, vegetables, and whole-grain breads  Prune juice may help make bowel movements softer  · Do not exercise outside on a hot day  The combination of physical activity and heat can lead to dehydration  This can cause syncope    © 2017 T[employer]anie[employer] David Apodaca Information is for End User's use only and may not be sold, redistributed or otherwise used for commercial purposes  All illustrations and images included in CareNotes® are the copyrighted property of A D A M , Inc  or Melecio Rhodes  The above information is an  only  It is not intended as medical advice for individual conditions or treatments  Talk to your doctor, nurse or pharmacist before following any medical regimen to see if it is safe and effective for you

## 2019-12-20 NOTE — SOCIAL WORK
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

## 2019-12-20 NOTE — TELEPHONE ENCOUNTER
----- Message from Marcy Pinedo MD sent at 12/20/2019  3:17 PM EST -----  Please set up/mail a 2 week zio patch for syncope  He was in hospital yesterday   thanks

## 2019-12-20 NOTE — PLAN OF CARE
Problem: Potential for Falls  Goal: Patient will remain free of falls  Description  INTERVENTIONS:  - Assess patient frequently for physical needs  -  Identify cognitive and physical deficits and behaviors that affect risk of falls    -  [place] fall precautions as indicated by assessment   - Educate patient/family on patient safety including physical limitations  - Instruct patient to call for assistance with activity based on assessment  - Modify environment to reduce risk of injury  - Consider OT/PT consult to assist with strengthening/mobility  12/20/2019 0733 by Delonte Starr RN  Outcome: Progressing  12/19/2019 1755 by Delonte Starr RN  Outcome: Progressing     Problem: PAIN - ADULT  Goal: Verbalizes/displays adequate comfort level or baseline comfort level  Description  Interventions:  - Encourage patient to monitor pain and request assistance  - Assess pain using appropriate pain scale  - Administer analgesics based on type and severity of pain and evaluate response  - Implement non-pharmacological measures as appropriate and evaluate response  - Notify physician/advanced practitioner if interventions unsuccessful or patient reports new pain   12/20/2019 0733 by Delonte Starr RN  Outcome: Progressing  12/19/2019 1755 by Delonte Starr RN  Outcome: Progressing     Problem: INFECTION - ADULT  Goal: Absence or prevention of progression during hospitalization  Description  INTERVENTIONS:  - Assess and monitor for signs and symptoms of infection  - Monitor lab/diagnostic results  - Monitor all insertion sites, i e  indwelling lines, tubes, and drains  - Administer medications as ordered  - Instruct and encourage patient and family to use good hand hygiene technique  - Identify and instruct in appropriate isolation precautions for identified infection/condition   12/20/2019 0733 by Delonte Starr RN  Outcome: Progressing  12/19/2019 1755 by Delonte Starr RN  Outcome: Progressing Problem: SAFETY ADULT  Goal: Patient will remain free of falls  Description  INTERVENTIONS:  - Assess patient frequently for physical needs  -  Identify cognitive and physical deficits and behaviors that affect risk of falls  -  [place] fall precautions as indicated by assessment   - Educate patient/family on patient safety including physical limitations  - Instruct patient to call for assistance with activity based on assessment  - Modify environment to reduce risk of injury  - Consider OT/PT consult to assist with strengthening/mobility  12/20/2019 0733 by Javier Welsh RN  Outcome: Progressing  12/19/2019 1755 by Javier Welsh RN  Outcome: Progressing     Problem: DISCHARGE PLANNING  Goal: Discharge to home or other facility with appropriate resources  Description  INTERVENTIONS:  - Identify barriers to discharge w/patient and caregiver  - Arrange for needed discharge resources and transportation as appropriate  - Identify discharge learning needs (meds, wound care, etc )  - Arrange for interpretive services to assist at discharge as needed  - Refer to Case Management Department for coordinating discharge planning if the patient needs post-hospital services based on physician/advanced practitioner order or complex needs related to functional status, cognitive ability, or social support system  12/20/2019 0733 by Javier Welsh RN  Outcome: Progressing  12/19/2019 1755 by Javier Welsh RN  Outcome: Progressing     Problem: Knowledge Deficit  Goal: Patient/family/caregiver demonstrates understanding of disease process, treatment plan, medications, and discharge instructions  Description  Complete learning assessment and assess knowledge base    Interventions:  - Provide teaching at level of understanding  - Provide teaching via preferred learning methods  12/20/2019 0733 by Javeir Welsh RN  Outcome: Progressing  12/19/2019 1755 by Javier Welsh RN  Outcome: Progressing     Problem: NEUROSENSORY - ADULT  Goal: Achieves stable or improved neurological status  Description  INTERVENTIONS  - Monitor and report changes in neurological status  - Monitor vital signs such as temperature, blood pressure, glucose, and any other labs ordered   - Monitor for seizure activity and implement precautions if appropriate       12/20/2019 0733 by Romeo Cruz RN  Outcome: Progressing  12/19/2019 1755 by Romeo Cruz RN  Outcome: Progressing     Problem: CARDIOVASCULAR - ADULT  Goal: Maintains optimal cardiac output and hemodynamic stability  Description  INTERVENTIONS:  - Monitor I/O, vital signs and rhythm  - Monitor for S/S and trends of decreased cardiac output  - Administer and titrate ordered vasoactive medications to optimize hemodynamic stability  - Assess quality of pulses, skin color and temperature  - Assess for signs of decreased coronary artery perfusion  - Instruct patient to report change in severity of symptoms  12/20/2019 0733 by Romeo Cruz RN  Outcome: Progressing  12/19/2019 1755 by Romeo Cruz RN  Outcome: Progressing

## 2019-12-20 NOTE — UTILIZATION REVIEW
Initial Clinical Review    Admission: Date/Time/Statement:  12/19/19 1036    Orders Placed This Encounter   Procedures    Place in Observation     Standing Status:   Standing     Number of Occurrences:   1     Order Specific Question:   Admitting Physician     Answer:   Mushtaq Trujillo [[id number]]     Order Specific Question:   Level of Care     Answer:   Med Surg [[id number]]     ED Arrival Information     Expected Arrival Acuity Means of Arrival Escorted By Service Admission Type    - 12/19/2019 09:08 Emergent Walk-In Self Hospitalist Emergency    Arrival Complaint    Syncope        Chief Complaint   Patient presents with    Syncope     patient complains of dizziness and states that today he had a syncopal episode  Assessment/Plan    62year old male presents to ed from home for evaluation and treatment of syncope  On arrival he reports that his wife witnessed him rising from bed followed by diaphoresis and loss of consciousness  He remembers going to the bathroom and then his wife standing over him  Episode was less than a minute  PMHX  PVCs with ablation, and recent negative bone marrow to rule out lymphoma  EKG shows first degree AV block  Troponin 0 05-0 02  Treated in ed with vitamin D3 and B 12 and E, lovenox sq  Admit to observation for syncope    Plan to check orthostatics  Consult electrophysiology and neurology,  Monitor on telemetry, obtain echo, trend troponin      New orders  for continued  telemetry and orthostatic blood pressure       ED Triage Vitals   12/19/19 1022 12/19/19 0944 12/19/19 0944 12/19/19 0944 12/19/19 0944   97 6 °F (36 4 °C) 64 18 133/63 97 %      Oral          No Pain       12/19/19 88 kg (194 lb)     Additional Vital Signs      Date/Time  Temp  Pulse  Resp  BP  MAP (mmHg)  SpO2  O2 Device  Patient Position - Orthostatic VS   12/20/19 07:38:38  --  62  --  114/67  83  96 %  --     12/20/19 0730  --  --  --  --  --  --  None (Room air)     12/19/19 2120  --  --  --  107/64 --  --  --  Standing - Orthostatic VS   12/19/19 2117  --  --  --  116/61  --  --  --  Sitting - Orthostatic VS   12/19/19 2115  --  --  --  97/63  --  --  --  Lying - Orthostatic VS   12/19/19 19:06:51  --  66  --  133/74  94  98 %  --     12/19/19 1745  --  --  --  --  --  --  None (Room air)     12/19/19 17:44:04  --  58  --  --  --  97 %  --     12/19/19 1740  --  66  16  133/80  --  97 %  --     12/19/19 1708  --  73  --  94/60  --  --  --  Standing - Orthostatic VS   12/19/19 1707  --  61  --  116/60  --  --  --  Sitting - Orthostatic VS   12/19/19 1706  --  58  --  115/60  --  --  --  Lying - Orthostatic VS           Pertinent Labs/Diagnostic Test Results:     Date/Time: today/date: 12/19/2019      ECG reviewed by me, the ED Provider: yes    Patient location:  ED   Previous ECG:  Compared to current, no change   Rate:  ECG rate assessment: normal    Rhythm: sinus rhythm    Ectopy:  : none    QRS axis:  Normal  Intervals: normal   Q waves: None   ST segments:  Normal  T waves: normal       Impression:  First-degree AV block  CTA head and neck w wo contrast   Final (12/20 0811)      1  No acute intracranial CT abnormality  2   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      3  Unremarkable CT angiogram of the head and neck  XR chest 2 views   Final (12/19 1115)      No acute cardiopulmonary disease            Results from last 7 days   Lab Units 12/20/19  0648 12/19/19  0954   WBC Thousand/uL 6 48 6 95   HEMOGLOBIN g/dL 14 4 14 5   HEMATOCRIT % 44 3 44 7   PLATELETS Thousands/uL 233 229   NEUTROS ABS Thousands/µL  --  4 12         Results from last 7 days   Lab Units 12/20/19  0648 12/19/19  0954   SODIUM mmol/L 143 142   POTASSIUM mmol/L 4 2 4 2   CHLORIDE mmol/L 112* 110*   CO2 mmol/L 27 29   ANION GAP mmol/L 4 3*   BUN mg/dL 17 23   CREATININE mg/dL 0 86 0 94   EGFR ml/min/1 73sq m 92 86   CALCIUM mg/dL 9 3 9 2   MAGNESIUM mg/dL 2 4  --      Results from last 7 days   Lab Units 12/19/19  0954   AST U/L 21   ALT U/L 28   ALK PHOS U/L 68   TOTAL PROTEIN g/dL 7 1   ALBUMIN g/dL 3 6   TOTAL BILIRUBIN mg/dL 0 46         Results from last 7 days   Lab Units 12/20/19  0648 12/19/19  0954   GLUCOSE RANDOM mg/dL 85 77         Results from last 7 days   Lab Units 12/20/19  0648   HEMOGLOBIN A1C % 5 1   EAG mg/dl 100       Results from last 7 days   Lab Units 12/19/19  1630 12/19/19  1331 12/19/19  0954   TROPONIN I ng/mL <0 02 <0 02 0 05*             Results from last 7 days   Lab Units 12/19/19  1630 12/19/19  0954   TSH 3RD GENERATON uIU/mL 0 838 0 889     ED Treatment:   Medication Administration from 12/19/2019 0908 to 12/19/2019 1720       Date/Time Order Dose Route Action     12/19/2019 1411 cholecalciferol (VITAMIN D3) tablet 1,000 Units 1,000 Units Oral Given     12/19/2019 1410 cyanocobalamin (VITAMIN B-12) tablet 750 mcg 750 mcg Oral Given     12/19/2019 1409 vitamin E (tocopherol) capsule 200 Units 200 Units Oral Given     12/19/2019 1412 enoxaparin (LOVENOX) subcutaneous injection 40 mg 40 mg Subcutaneous Given        Past Medical History:   Diagnosis Date    Disease of thyroid gland      Present on Admission:   Syncope   Frequent PVCs      Admitting Diagnosis:     Dizziness [R42]  Syncope [R55]  Elevated troponin [R79 89]  Syncope, unspecified syncope type [R55]    Age/Sex: 62 y o  male     Admission Orders:  Scheduled Medications:    Medications:  aspirin 81 mg Oral Daily   cholecalciferol 1,000 Units Oral Daily   cyanocobalamin 750 mcg Oral Daily   enoxaparin 40 mg Subcutaneous Daily   finasteride 5 mg Oral Daily   levothyroxine 100 mcg Oral Early Morning   multivitamin stress formula 1 tablet Oral Daily   tamsulosin 0 4 mg Oral Daily With Dinner   vitamin E (tocopherol) 200 Units Oral Daily     Continuous IV Infusions:     PRN Meds:    acetaminophen 650 mg Oral Q6H PRN       IP CONSULT TO NEUROLOGY  IP CONSULT TO ELECTROPHYSIOLOGY    Network Utilization Review Department  [email or web address]  ATTENTION: Please call with any questions or concerns to [phone] and carefully listen to the prompts so that you are directed to the right person  All voicemails are confidential   [facility] all requests for admission clinical reviews, approved or denied determinations and any other requests to dedicated fax number below belonging to the campus where the patient is receiving treatment   List of dedicated fax numbers for the Facilities:  [address] DENIALS (Administrative/Medical Necessity) [phone]   [address] (Maternity/NICU/Pediatrics) [phone]   [facility] [phone]   Elva Comes [phone]   [place] [facility] [phone]   [facility] [phone]   [address] [place] [address] [phone]   [facility]  [phone]   [address] [place] [address], [address]  [address] [facility] [address] [facility] [phone]

## 2019-12-20 NOTE — RESTORATIVE TECHNICIAN NOTE
Restorative Specialist Mobility Note       Activity: Ambulate in conte, Ambulate in room, Bathroom privileges, Chair, Dangle, Stand at bedside(Educated/encouraged pt to ambulate with assistance 3-4 x's/day  Bed alarm on   Pt callbell, phone/tray within reach )     Assistive Device: None       Marisabel Richard BS, Restorative Technician, [employer]

## 2019-12-20 NOTE — PROGRESS NOTES
Progress Note - Neurology   Zofia Kruse 62 y o  male MRN: [id number]  [facility] Encounter: [id number]    Assessment/Plan:  Suspect orthostatic hypotension as etiology of near syncope  Neurologic exam nonfocal  CTA head and neck unremarkable  EEG normal  Discussed with patient increasing hydration to 64 oz daily as well as limiting caffeine intake  Neurology will sign off  Please call if further questions or concerns  Subjective:   Patient seen and examined  He states he continues to have occasional lightheaded this however this is not atypical to the symptoms he has been having over the last year  States he has realize that he sits on the side of the bed waits for some time before standing up and walking quickly this resolves  Orthostatic vitals-- first one somewhat positive with BP drop from 115/60 to 94/60 however patient reports being symptomatic  ROS:  Ten point ROS completed and negative other than that noted above    Vitals: Blood pressure 97/64, pulse 85, temperature 97 6 °F (36 4 °C), temperature source Oral, resp  rate 20, height 6' (1 829 m), weight 88 kg (194 lb), SpO2 96 %  ,Body mass index is 26 31 kg/m²  Physical Exam: Gen: NAD  HEENT: NCAT, EOMI  Resp: Symmetric chest rise bl  CVS: RRR  Ext: no edema    AO X 4  CN 2-12 grossly intact  Motor: 5/5 ext x 4  Reflexes: 2+/4 bl patellar and biceps  Sensation: Intact to all modalities x 4  Cerebellar: No dysmetria b/l  Gait: normal range  Romberg negative        Lab, Imaging and other studies: I have personally reviewed pertinent reports  Counseling / Coordination of Care  Total time spent today 25 minutes  Greater than 50% of total time was spent with the patient and / or family counseling and / or coordination of care   A description of the counseling / coordination of care: as detailed above including increasing hydration, and reducing caffeine intake as noted in A/P

## 2020-01-16 ENCOUNTER — CLINICAL SUPPORT (OUTPATIENT)
Dept: CARDIOLOGY CLINIC | Facility: CLINIC | Age: 65
End: 2020-01-16
Payer: COMMERCIAL

## 2020-01-16 DIAGNOSIS — R55 VASOVAGAL SYNCOPE: ICD-10-CM

## 2020-01-16 PROCEDURE — 0298T PR EXT ECG > 48HR TO 21 DAY REVIEW AND INTERPRETATN: CPT | Performed by: INTERNAL MEDICINE

## 2020-11-05 ENCOUNTER — OFFICE VISIT (OUTPATIENT)
Dept: CARDIOLOGY CLINIC | Facility: CLINIC | Age: 65
End: 2020-11-05
Payer: COMMERCIAL

## 2020-11-05 VITALS
TEMPERATURE: 98.3 F | SYSTOLIC BLOOD PRESSURE: 120 MMHG | BODY MASS INDEX: 27.36 KG/M2 | HEIGHT: 72 IN | DIASTOLIC BLOOD PRESSURE: 82 MMHG | HEART RATE: 52 BPM | WEIGHT: 202 LBS

## 2020-11-05 DIAGNOSIS — I49.3 FREQUENT PVCS: Primary | ICD-10-CM

## 2020-11-05 PROCEDURE — 93000 ELECTROCARDIOGRAM COMPLETE: CPT | Performed by: INTERNAL MEDICINE

## 2020-11-05 PROCEDURE — 99214 OFFICE O/P EST MOD 30 MIN: CPT | Performed by: INTERNAL MEDICINE

## 2021-03-10 ENCOUNTER — TELEPHONE (OUTPATIENT)
Dept: CARDIOLOGY CLINIC | Facility: CLINIC | Age: 66
End: 2021-03-10

## 2021-03-10 DIAGNOSIS — Z23 ENCOUNTER FOR IMMUNIZATION: ICD-10-CM

## 2021-03-10 NOTE — TELEPHONE ENCOUNTER
Patient of Jhonatan Castanon was last seen on 11/5/20  Patient called today stating he has been having flutters and SOB  Patient is not on any cardiac medications   Please advise, thank you

## 2021-03-15 ENCOUNTER — TELEPHONE (OUTPATIENT)
Dept: CARDIOLOGY CLINIC | Facility: CLINIC | Age: 66
End: 2021-03-15

## 2021-03-15 ENCOUNTER — OFFICE VISIT (OUTPATIENT)
Dept: CARDIOLOGY CLINIC | Facility: CLINIC | Age: 66
End: 2021-03-15
Payer: COMMERCIAL

## 2021-03-15 VITALS
RESPIRATION RATE: 16 BRPM | HEART RATE: 56 BPM | WEIGHT: 204.7 LBS | DIASTOLIC BLOOD PRESSURE: 70 MMHG | HEIGHT: 72 IN | SYSTOLIC BLOOD PRESSURE: 122 MMHG | BODY MASS INDEX: 27.73 KG/M2

## 2021-03-15 DIAGNOSIS — I49.3 FREQUENT PVCS: Primary | ICD-10-CM

## 2021-03-15 DIAGNOSIS — E03.9 HYPOTHYROIDISM, UNSPECIFIED TYPE: ICD-10-CM

## 2021-03-15 PROCEDURE — 99214 OFFICE O/P EST MOD 30 MIN: CPT | Performed by: PHYSICIAN ASSISTANT

## 2021-03-15 PROCEDURE — 93000 ELECTROCARDIOGRAM COMPLETE: CPT | Performed by: PHYSICIAN ASSISTANT

## 2021-03-15 NOTE — PATIENT INSTRUCTIONS
Please place Zio patch on 3/25 after biopsy  Our office will also call you to make sure no issues with placement but if you need guidance our office staff can be reached at [phone]

## 2021-03-15 NOTE — PROGRESS NOTES
Electrophysiology Office Visit  [address] [facility] Cardiology [facility]  [address] [facility], [place], [facility] [address]     Name: Terry Crigler  : 1955  MRN: [id number]    ASSESSMENT:  1  Frequent PVCs     2  Hypothyroidism, unspecified type         PLAN:  1  History of PVCs  - status post successful ablation of RVOT free wall PVC by Dr Han Varma on 2018  - EF of 55% per echo 2019  - on beta-blocker therapy as an outpatient / side effects with flecainide in the past  - of note, VT not inducible during EP study and therefore no ICD implanted  2  VA prolongation at baseline  3  Lymphadenopathy  - has had workup at [facility] rheumatology and [facility]  - no evidence of cardiac sarcoidosis  4  Hypothyroidism   5  History of Lyme disease  - had been maintained on doxycycline for 2 years    IMPRESSION:  1  PVCs - Patient reports that within the last month he has had brief episodes of his heart fluttering consistent with this PVCs in the past and that 1 did last a little longer  Would like to quantify PVC burden and will therefore have him wear a Zio monitor for 2 weeks  Patient is having prostate biopsy on 3/24 - we will have him placed this just afterwards  Will follow up with him in the office after the results  Patient is to follow up in our EP office in 2 months to discuss his results if needed  He is to call our office with any further questions or concerns in the meantime  HPI:   Interim history: Terry Crigler is a 60 y o  male with symptomatic PVCs status post ablation, first degree AV block, and possible sarcoidosis  He presents today for office visit  He was referred to Dr Han Varma by Dr Harvey Bella for his PVCs back in   He had become more symptomatic with his PVCs with palpitations, dizziness , and intermittent shortness of breath with exertion at the gym  Therefore, he was seen in consultation for his ectopy    He was also worked up for sarcoidosis with cardiac MRI which did show patchy subepicardial fibrosis of the basal inferior septum at the right ventricle insertion point  CT of the chest did not show lymphadenopathy  Therefore, he was referred to [address] [place] [address] rheumatology for further workup with cardiac FDG PET CT  In the interim, with his PVCs he was trialed on flecainide but had side effects from this and this was therefore discontinued  He did present to the hospital in December of 2018 and underwent PVC ablation by Dr Jaron Syed  Of note, during his procedure EP study was performed and VT was not inducible  Therefore, ICD was not pursued at that time  From electrophysiology standpoint, he has done well since  He did have a hospitalization in December 2019 for syncope but this was felt to be vasovagal and 2 week Zio patch that was ordered after discharge showed no significant ventricular ectopy  He presents today due to feeling palpitations at home  He reports that this started about a month ago and he has had about 10 to 12 episodes  He has no inciting factor as he can be sitting in his office, watching TV, or just walking about  Has not changed any of his medications recently  He is not significantly fatigued or short of breath throughout the day but did report that 1 episode 2 weeks ago lasted about 45 seconds and he did become short of breath with this  Patient still remains active going to the gym at least 3 times a week  He is still following with Rheumatology with routine CT scans for surveillance of his lymph nodes  PSA was elevated and will therefore undergo prostate biopsy in about a week  He also does have significant pain in his coccyx region that is being worked up for possible cyst     He denies any shortness of breath with exertion, lower extremity edema, or orthopnea      EKG: sinus bradycardia at 56 beats per minute with IN prolongation, normal axis, no evidence of QT prolongation or PVC seen on rhythm strip    ROS:   Review of Systems   Constitution: Positive for malaise/fatigue  Negative for chills and fever  Cardiovascular: Positive for chest pain, dyspnea on exertion and palpitations  Negative for leg swelling, near-syncope and syncope  Respiratory: Positive for shortness of breath  All other systems reviewed and are negative  OBJECTIVE:   Vitals:   /70   Pulse 56   Resp 16   Ht 6' (1 829 m)   Wt 92 9 kg (204 lb 11 2 oz)   BMI 27 76 kg/m²       Physical Exam   Constitutional: He is oriented to person, place, and time  He appears well-developed and well-nourished  No distress  HENT:   Head: Normocephalic and atraumatic  Eyes: Pupils are equal, round, and reactive to light  Neck: Normal range of motion  No JVD present  Cardiovascular: Normal rate and regular rhythm  Exam reveals no gallop and no friction rub  No murmur heard  Pulmonary/Chest: Effort normal and breath sounds normal  No respiratory distress  He has no wheezes  Abdominal: Soft  Musculoskeletal: Normal range of motion  Neurological: He is alert and oriented to person, place, and time  Skin: Skin is warm and dry  He is not diaphoretic  Psychiatric: He has a normal mood and affect  Nursing note and vitals reviewed        Medications:      Current Outpatient Medications:     Ascorbic Acid (VITAMIN C) 100 MG tablet, Take 100 mg by mouth daily, Disp: , Rfl:     aspirin 81 MG tablet, Take 81 mg by mouth, Disp: , Rfl:     b complex vitamins capsule, Take 1 capsule by mouth daily, Disp: , Rfl:     cholecalciferol (VITAMIN D3) 1,000 units tablet, Take 1,000 Units by mouth daily, Disp: , Rfl:     cyanocobalamin (VITAMIN B-12) 100 mcg tablet, Take 750 mcg by mouth daily  , Disp: , Rfl:     levothyroxine 100 mcg tablet, TAKE 1 TABLET BY MOUTH DAILY, Disp: , Rfl:     MAGNESIUM PO, Take 1 tablet by mouth daily, Disp: , Rfl:     Saw Palmetto, Serenoa repens, (SAW PALMETTO CONCENTRATE PO), Take by mouth, Disp: , Rfl:     vitamin E 100 UNIT capsule, Take 100 Units by mouth daily, Disp: , Rfl:      No family history on file  Social History     Socioeconomic History    Marital status: /Civil Union     Spouse name: Not on file    Number of children: Not on file    Years of education: Not on file    Highest education level: Not on file   Occupational History    Not on file   Social Needs    Financial resource strain: Not on file    Food insecurity     Worry: Not on file     Inability: Not on file   [employer] needs     Medical: Not on file     Non-medical: Not on file   Tobacco Use    Smoking status: Never Smoker    Smokeless tobacco: Never Used   Substance and Sexual Activity    Alcohol use: Yes     Comment: rare    Drug use: No    Sexual activity: Not on file   Lifestyle    Physical activity     Days per week: Not on file     Minutes per session: Not on file    Stress: Not on file   Relationships    Social connections     Talks on phone: Not on file     Gets together: Not on file     Attends Congregation service: Not on file     Active member of club or organization: Not on file     Attends meetings of clubs or organizations: Not on file     Relationship status: Not on file    Intimate partner violence     Fear of current or ex partner: Not on file     Emotionally abused: Not on file     Physically abused: Not on file     Forced sexual activity: Not on file   Other Topics Concern    Not on file   Social History Narrative    Not on file     Social History     Tobacco Use   Smoking Status Never Smoker   Smokeless Tobacco Never Used     Social History     Substance and Sexual Activity   Alcohol Use Yes    Comment: rare       Labs & Results:  Below is the patient's most recent value for Albumin, ALT, AST, BUN, Calcium, Chloride, Cholesterol, CO2, Creatinine, GFR, Glucose, HDL, Hematocrit, Hemoglobin, Hemoglobin A1C, LDL, Magnesium, Phosphorus, Platelets, Potassium, PSA, Sodium, Triglycerides, and WBC  Lab Results   Component Value Date    ALT 28 2019    AST 21 2019    BUN 17 2019    CALCIUM 9 3 2019     (H) 2019    CO2 27 2019    CREATININE 0 86 2019    HDL 63 2019    HCT 44 3 2019    HGB 14 4 2019    HGBA1C 5 1 2019    MG 2 4 2019     2019    K 4 2 2019    TRIG 62 2019    WBC 6 48 2019     Note: for a comprehensive list of the patient's lab results, access the Results Review activity  CARDIAC TESTING:   ECHO:   Results for orders placed during the hospital encounter of 19   Echo complete with contrast if indicated    Narrative [address]  [facility], [address] [place] [address]  [phone]    Transthoracic Echocardiogram  2D, M-mode, Doppler, and Color Doppler    Study date:  20-Dec-2019    Patient: Gloria Jacobs  MR number: [id number]  Account number: [de-identified]  : 1955  Age: 62 years  Gender: Male  Status: Outpatient  Location: Bedside  Height: 73 in  Weight: 195 lb  BP: 120/ 86 mmHg    Indications: Syncope and Collapse    Sonographer:  SHAINA Herring  Primary Physician:  Tony Ferreira MD  Referring Physician:  Zohra Valencia DO  Cardiology Fellow: Elisa Mary MD  Interpreting Physician:  Fredna Soulier, DO    SUMMARY    LEFT VENTRICLE:  Systolic function was normal  Ejection fraction was estimated to be 55 %  There were no regional wall motion abnormalities  The changes were consistent with concentric remodeling (increased wall thickness with normal wall mass)  Left ventricular diastolic function parameters were normal     RIGHT VENTRICLE:  The size was at the upper limits of normal   Systolic function was normal     TRICUSPID VALVE:  There was mild regurgitation  COMPARISONS:  There has been no significant interval change  Comparison was made with the previous study of 2018      HISTORY: PRIOR HISTORY: Lyme disease and AV Block    PROCEDURE: The procedure was performed at the bedside  This was a routine study  The transthoracic approach was used  The study included complete 2D imaging, M-mode, complete spectral Doppler, and color Doppler  The heart rate was 58 bpm,  at the start of the study  Images were obtained from the parasternal, apical, subcostal, and suprasternal notch acoustic windows  Image quality was good  LEFT VENTRICLE: Size was normal  Systolic function was normal  Ejection fraction was estimated to be 55 %  There were no regional wall motion abnormalities  Wall thickness was normal  The changes were consistent with concentric remodeling  (increased wall thickness with normal wall mass)  DOPPLER: The pulmonary vein flow pattern was normal  Left ventricular diastolic function parameters were normal     RIGHT VENTRICLE: The size was at the upper limits of normal  Systolic function was normal     LEFT ATRIUM: Size was normal     RIGHT ATRIUM: Size was normal     MITRAL VALVE: Valve structure was normal  There was normal leaflet separation  DOPPLER: The transmitral velocity was within the normal range  There was no evidence for stenosis  There was trace regurgitation  AORTIC VALVE: The valve was probably trileaflet  Leaflets exhibited normal thickness and normal cuspal separation  DOPPLER: Transaortic velocity was within the normal range  There was no evidence for stenosis  There was no regurgitation  TRICUSPID VALVE: The valve structure was normal  There was normal leaflet separation  DOPPLER: The transtricuspid velocity was within the normal range  There was no evidence for stenosis  There was mild regurgitation  Estimated peak PA  pressure was 20 mmHg  PULMONIC VALVE: Leaflets exhibited normal thickness, no calcification, and normal cuspal separation  DOPPLER: The transpulmonic velocity was within the normal range  There was no evidence for stenosis   There was no significant  regurgitation  PERICARDIUM: There was no pericardial effusion  The pericardium was normal in appearance  AORTA: The root exhibited upper limit of normal size when indexed for BSA  SYSTEMIC VEINS: IVC: The inferior vena cava was normal in size and course  Respirophasic changes were normal     SYSTEM MEASUREMENT TABLES    2D mode  AV Diam: 4 cm  AoR Diam; Mean (2D): 4 cm  LA Diam (2D): 3 7 cm  LA Dimension; Mean (2D): 3 7 cm  LA/Ao (2D): 0 93  EDV (2D-Cubed): 104 cm3  EF (2D-Cubed): 76 5 %  ESV (2D-Cubed): 24 4 cm3  FS (2D-Cubed): 38 3 %  FS (2D-Teich): 38 3 %  IVS/LVPW (2D): 1  IVSd (2D): 1 cm  IVSd; Mean chosen (2D): 1 cm  LVIDd (2D): 4 7 cm  LVIDd; Mean (2D): 4 7 cm  LVIDs (2D): 2 9 cm  LVIDs; Mean (2D): 2 9 cm  LVPWd (2D): 1 cm  LVPWd; Mean (2D): 1 cm  Left Ventricular Ejection Fraction; Teichholz; 2D mode;: 68 4 %  Left Ventricular End Diastolic Volume; Teichholz; 2D mode;: 102 cm3  Left Ventricular End Systolic Volume; Teichholz; 2D mode;: 32 2 cm3  SV (2D-Cubed): 79 6 cm3  Stroke Volume; Teichholz; 2D mode;: 69 8 cm3  RVIDd (2D): 4 cm  RVIDd; Mean (2D): 4 cm  Right and Left Ventricular End Diastolic Diameter Ratio; 2D mode;: 0 85    Apical four chamber  LV MOD Diam; Recent value; End Diastole (A4C): 1 51 cm  LV MOD Diam; Recent value; End Systole (A4C): 0 75 cm  LVEF MOD A4C: 51 4 %  Left Ventricle diastolic major axis; Most recent value chosen; Method of Disks, Single Plane; 2D mode; Apical four chamber;: 8 85 cm  Left Ventricle systolic major axis; Most recent value chosen; Method of Disks, Single Plane; 2D mode; Apical four chamber;: 7 94 cm  Left Ventricular Diastolic Area; Most recent value chosen; Method of Disks, Single Plane; 2D mode; Apical four chamber;: 3320 mm2  Left Ventricular End Diastolic Volume; Most recent value chosen; Method of Disks, Single Plane; 2D mode; Apical four chamber;: 103 cm3  Left Ventricular End Systolic Volume;  Most recent value chosen; Method of Disks, Single Plane; 2D mode; Apical four chamber;: 50 cm3  Left Ventricular Systolic Area; Most recent value chosen; Method of Disks, Single Plane; 2D mode; Apical four chamber;: 2100 mm2  SV MOD A4C: 52 9 cm3    M mode  Tricuspid Annular Plane Systolic Excursion; Mean; Mean value chosen; Tricuspid Annulus; M mode;: 2 4 cm  Tricuspid Annular Plane Systolic Excursion; Tricuspid Annulus; M mode;: 2 4 cm    Tissue Doppler Imaging  LV Peak Early Castañeda Tissue Johnny; Medial MA (TDI): 63 1 mm/s  Left Ventricular Peak Early Diastolic Tissue Velocity; Mean; Mean value chosen; Medial Mitral Annulus; Tissue Doppler Imaging;: 63 1 mm/s    Unspecified Scan Mode  MV Peak Johnny/LV Peak Tissue Johnny E-Wave; Medial MA: 11 6  DT; Antegrade Flow: 271 ms  DT; Mean; Antegrade Flow: 271 ms  Dec [place]; Antegrade Flow: 2700 mm/s2  Dec [place]; Mean; Antegrade Flow: 2700 mm/s2  MV A Johnny: 684 mm/s  MV E Johnny: 732 mm/s  MV E/A Ratio: 1 1  MV Peak A Johnny: 684 mm/s  MV Peak E Johnny; Mean; Antegrade Flow: 732 mm/s  MVA (PHT): 278 mm2  PHT: 79 ms  PHT; Mean: 79 ms  Peak Grad; Mean; Regurgitant Flow: 15 mm[Hg]  Vmax; Mean; Regurgitant Flow: 1940 mm/s  Vmax; Regurgitant Flow: 1740 mm/s    [employer] Accredited Echocardiography Laboratory    Prepared and electronically signed by    Kranthi Constantino DO  Signed 20-Dec-2019 10:47:31       No results found for this or any previous visit  CATH:  No results found for this or any previous visit  STRESS TEST:  No results found for this or any previous visit

## 2021-04-22 ENCOUNTER — CLINICAL SUPPORT (OUTPATIENT)
Dept: CARDIOLOGY CLINIC | Facility: CLINIC | Age: 66
End: 2021-04-22
Payer: COMMERCIAL

## 2021-04-22 DIAGNOSIS — I49.3 FREQUENT PVCS: Primary | ICD-10-CM

## 2021-04-22 DIAGNOSIS — I49.3 FREQUENT PVCS: ICD-10-CM

## 2021-04-22 PROCEDURE — 93248 EXT ECG>7D<15D REV&INTERPJ: CPT | Performed by: INTERNAL MEDICINE

## 2021-06-01 ENCOUNTER — HOSPITAL ENCOUNTER (OUTPATIENT)
Dept: NON INVASIVE DIAGNOSTICS | Age: 66
Discharge: HOME | End: 2021-06-01
Payer: COMMERCIAL

## 2021-06-01 DIAGNOSIS — I49.3 FREQUENT PVCS: ICD-10-CM

## 2021-06-01 PROCEDURE — 93306 TTE W/DOPPLER COMPLETE: CPT

## 2021-06-01 PROCEDURE — 93306 TTE W/DOPPLER COMPLETE: CPT | Performed by: INTERNAL MEDICINE

## 2021-12-07 ENCOUNTER — OFFICE VISIT (OUTPATIENT)
Dept: CARDIOLOGY CLINIC | Facility: CLINIC | Age: 66
End: 2021-12-07
Payer: COMMERCIAL

## 2021-12-07 VITALS
BODY MASS INDEX: 28.12 KG/M2 | HEIGHT: 72 IN | SYSTOLIC BLOOD PRESSURE: 114 MMHG | DIASTOLIC BLOOD PRESSURE: 80 MMHG | WEIGHT: 207.6 LBS | RESPIRATION RATE: 16 BRPM | HEART RATE: 60 BPM

## 2021-12-07 DIAGNOSIS — I49.3 PVC (PREMATURE VENTRICULAR CONTRACTION): Primary | ICD-10-CM

## 2021-12-07 PROCEDURE — 99214 OFFICE O/P EST MOD 30 MIN: CPT | Performed by: PHYSICIAN ASSISTANT

## 2024-04-04 NOTE — TELEPHONE ENCOUNTER
He does not need auth for his Zio Patch per [employer] system  Conf# [id number]
I called the pt to advise him that he may apply the monitor since no auth is needed 
[facility], pt is here in the office for [address] with [employer]  He needs a 2 week, non-live Zio Patch  I will give him the monitor to have to apply on his own, at home, after his prostate biopsy, scheduled for March 24, 2021  Does he need auth? And when I get this back, I can call him to be sure he is aware that he can or cannot put it on  Thanks!  Jade Nicholson
never

## 2024-05-07 ENCOUNTER — TELEPHONE (OUTPATIENT)
Dept: CARDIOLOGY CLINIC | Facility: CLINIC | Age: 69
End: 2024-05-07

## 2024-05-07 NOTE — TELEPHONE ENCOUNTER
Received call from patient. He is requesting for a cardiac clearance for his upcoming nerve biopsy at [facility] on 05/20/2024.    Patient has not been seen in office since 2021.    Transferred call to  to schedule f/u.

## 2024-05-13 ENCOUNTER — OFFICE VISIT (OUTPATIENT)
Dept: CARDIOLOGY CLINIC | Facility: CLINIC | Age: 69
End: 2024-05-13
Payer: COMMERCIAL

## 2024-05-13 VITALS
HEIGHT: 72 IN | DIASTOLIC BLOOD PRESSURE: 80 MMHG | BODY MASS INDEX: 28.62 KG/M2 | WEIGHT: 211.3 LBS | SYSTOLIC BLOOD PRESSURE: 122 MMHG | HEART RATE: 67 BPM

## 2024-05-13 DIAGNOSIS — I49.3 PVCS (PREMATURE VENTRICULAR CONTRACTIONS): Primary | ICD-10-CM

## 2024-05-13 PROCEDURE — 93000 ELECTROCARDIOGRAM COMPLETE: CPT | Performed by: PHYSICIAN ASSISTANT

## 2024-05-13 PROCEDURE — 99214 OFFICE O/P EST MOD 30 MIN: CPT | Performed by: PHYSICIAN ASSISTANT

## 2024-05-13 RX ORDER — DULOXETIN HYDROCHLORIDE 20 MG/1
20 CAPSULE, DELAYED RELEASE ORAL DAILY
COMMUNITY

## 2024-05-13 RX ORDER — TADALAFIL 5 MG/1
5 TABLET ORAL DAILY PRN
COMMUNITY
Start: 2024-04-02 | End: 2024-09-29

## 2024-05-13 NOTE — PROGRESS NOTES
"Electrophysiology Follow Up  Heart & Vascular Center  [facility] [address], [place] [address]    Name: Angel Escoto  : 1955  MRN: [id number]    ASSESSMENT/PLAN:  Hx of PVCs  Diagnosed 2018 with symptomatic PVCs  Seen by Dr. Sheffield and sent for cardiac MRI due to concerns of RVOT free wall  AAD-flecainide 50 mg twice daily unable to tolerate due to side effects  Rate control-atenolol attempted unable to tolerate due to side effects  Status post RFA of PVC from RVOT free wall by Dr. Sheffield in 2018  Discussed with Dr. Sheffield patient is okay to discontinue aspirin going forward.  No indication to restart after his procedure  Hypothyroidism  Prostate CA s/p prostatectomy  Numbness in b/l legs   Patient undergo nerve biopsy at [place] on Monday.  He is overall low risk for perioperative cardiovascular complications       Patient has been instructed to follow up in our EP office PRN. He will call our office with any questions or concerns in the meantime.      CC/HPI:   Interim history: Angel sEcoto is a 68 y.o. male with above past medical history presents today for cardiac clearance.  Overall he has been in his usual state of health he has no new concerns or complaints.  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  He intermittently has brief palpitations but significantly improved from PVC ablation.  Echocardiogram from 2021 unremarkable and given no change in clinical condition no indication for repeat prior to procedure.      EKG: Sinus rhythm    Review of Systems      OBJECTIVE:   Vitals:   /80 (BP Location: Left arm, Patient Position: Sitting, Cuff Size: Standard)   Pulse 67   Ht 5' 11.75\" (1.822 m)   Wt 95.8 kg (211 lb 4.8 oz)   BMI 28.86 kg/m²   Body mass index is 28.86 kg/m².        Physical Exam:   Physical Exam  Constitutional:       General: He " is not in acute distress.     Appearance: He is not ill-appearing.   HENT:      Head: Normocephalic.   Cardiovascular:      Rate and Rhythm: Normal rate and regular rhythm.      Heart sounds: No murmur heard.     No friction rub. No gallop.   Pulmonary:      Effort: Pulmonary effort is normal. No respiratory distress.      Breath sounds: No wheezing, rhonchi or rales.   Chest:      Chest wall: No tenderness.   Skin:     General: Skin is warm and dry.   Neurological:      Mental Status: He is alert.            Medications:      Current Outpatient Medications:     cholecalciferol (VITAMIN D3) 1,000 units tablet, Take 1,000 Units by mouth daily, Disp: , Rfl:     cyanocobalamin (VITAMIN B-12) 100 mcg tablet, Take 750 mcg by mouth daily  , Disp: , Rfl:     DULoxetine (CYMBALTA) 20 mg capsule, Take 20 mg by mouth daily, Disp: , Rfl:     levothyroxine 100 mcg tablet, TAKE 1 TABLET BY MOUTH DAILY, Disp: , Rfl:     MAGNESIUM PO, Take 1 tablet by mouth daily, Disp: , Rfl:     Pyridoxine HCl (VITAMIN B6 PO), Take by mouth daily, Disp: , Rfl:     tadalafil (CIALIS) 5 MG tablet, Take 5 mg by mouth daily as needed, Disp: , Rfl:     Ascorbic Acid (VITAMIN C) 100 MG tablet, Take 100 mg by mouth daily (Patient not taking: Reported on 5/13/2024), Disp: , Rfl:     b complex vitamins capsule, Take 1 capsule by mouth daily (Patient not taking: Reported on 5/13/2024), Disp: , Rfl:     Saw Palmetto, Serenoa repens, (SAW PALMETTO CONCENTRATE PO), Take by mouth (Patient not taking: Reported on 5/13/2024), Disp: , Rfl:     vitamin E 100 UNIT capsule, Take 100 Units by mouth daily (Patient not taking: Reported on 5/13/2024), Disp: , Rfl:        Historical Information   Past Medical History:   Diagnosis Date    Disease of thyroid gland        Past Surgical History:   Procedure Laterality Date    ABLATION OF DYSRHYTHMIC FOCUS         Social History     Substance and Sexual Activity   Alcohol Use Yes    Comment: rare     Social History      Substance and Sexual Activity   Drug Use No     Social History     Tobacco Use   Smoking Status Never   Smokeless Tobacco Never       No family history on file.      Labs & Results:  Below is the patient's most recent value for Albumin, ALT, AST, BUN, Calcium, Chloride, Cholesterol, CO2, Creatinine, GFR, Glucose, HDL, Hematocrit, Hemoglobin, Hemoglobin A1C, LDL, Magnesium, Phosphorus, Platelets, Potassium, PSA, Sodium, Triglycerides, and WBC.   Lab Results   Component Value Date    ALT 25 11/09/2023    AST 27 11/09/2023    BUN 18 11/09/2023    CALCIUM 9.5 11/09/2023     11/09/2023    CO2 26 11/09/2023    CREATININE 0.92 11/09/2023    HDL 63 12/20/2019    HCT 44.3 12/20/2019    HGB 14.4 12/20/2019    HGBA1C 5.1 10/28/2022    MG 2.4 12/20/2019     12/20/2019    K 4.5 11/09/2023    TRIG 62 12/20/2019    WBC 6.48 12/20/2019     Note: for a comprehensive list of the patient's lab results, access the Results Review activity.

## 2024-05-13 NOTE — LETTER
Cardiology Pre Operative Clearance      PRE OPERATIVE CARDIAC RISK ASSESSMENT    05/13/24    Angel Escoto  1955  [id number]    Date of Surgery: 5/20/2024    Type of Surgery: Peripheral biopsy of sural nerve     Surgeon: Dr. Miki Silveira    No Cardiac Contraindication for Planned Surgical Procedures    Anticoagulation: not applicable    Physician Comment: OK to hold ASA    Electronically Signed: Minerva Rodriguez PA-C

## 2024-05-14 ENCOUNTER — TELEPHONE (OUTPATIENT)
Dept: CARDIOLOGY CLINIC | Facility: CLINIC | Age: 69
End: 2024-05-14

## 2024-05-14 NOTE — TELEPHONE ENCOUNTER
Margot from [facility] medical records department calling to get EKG graphing paper uploaded into chart. Call disconnected.

## 2024-10-23 ENCOUNTER — LAB REQUISITION (OUTPATIENT)
Dept: LAB | Facility: HOSPITAL | Age: 69
End: 2024-10-23
Payer: COMMERCIAL

## 2024-10-23 DIAGNOSIS — R11.2 NAUSEA & VOMITING: ICD-10-CM

## 2024-10-23 DIAGNOSIS — E87.70 HYPERVOLEMIA: Primary | ICD-10-CM

## 2024-10-23 DIAGNOSIS — N17.9 ACUTE KIDNEY FAILURE, UNSPECIFIED (HCC): ICD-10-CM

## 2024-10-23 LAB
ANION GAP SERPL CALCULATED.3IONS-SCNC: 10 MMOL/L (ref 4–13)
BUN SERPL-MCNC: 88 MG/DL (ref 5–25)
CALCIUM SERPL-MCNC: 7.9 MG/DL (ref 8.4–10.2)
CHLORIDE SERPL-SCNC: 101 MMOL/L (ref 96–108)
CO2 SERPL-SCNC: 25 MMOL/L (ref 21–32)
CREAT SERPL-MCNC: 4.94 MG/DL (ref 0.6–1.3)
GFR SERPL CREATININE-BSD FRML MDRD: 11 ML/MIN/1.73SQ M
GLUCOSE SERPL-MCNC: 97 MG/DL (ref 65–140)
POTASSIUM SERPL-SCNC: 3.5 MMOL/L (ref 3.5–5.3)
SODIUM SERPL-SCNC: 136 MMOL/L (ref 135–147)

## 2024-10-23 PROCEDURE — 80048 BASIC METABOLIC PNL TOTAL CA: CPT | Performed by: INTERNAL MEDICINE

## 2024-10-23 RX ORDER — PROCHLORPERAZINE MALEATE 5 MG/1
5 TABLET ORAL EVERY 6 HOURS PRN
Qty: 60 TABLET | Refills: 3 | Status: SHIPPED | OUTPATIENT
Start: 2024-10-23

## 2024-10-23 RX ORDER — TORSEMIDE 100 MG/1
100 TABLET ORAL DAILY
Qty: 30 TABLET | Refills: 3 | Status: SHIPPED | OUTPATIENT
Start: 2024-10-23

## 2024-10-25 DIAGNOSIS — E87.70 HYPERVOLEMIA, UNSPECIFIED HYPERVOLEMIA TYPE: Primary | ICD-10-CM

## 2024-10-30 DIAGNOSIS — N10 ACUTE INTERSTITIAL NEPHRITIS: Primary | ICD-10-CM

## 2024-10-30 RX ORDER — PREDNISONE 10 MG/1
TABLET ORAL
Qty: 75 TABLET | Refills: 0 | Status: SHIPPED | OUTPATIENT
Start: 2024-11-02 | End: 2024-11-27

## 2024-10-30 RX ORDER — PREDNISONE 1 MG/1
TABLET ORAL
Qty: 43 TABLET | Refills: 0 | Status: SHIPPED | OUTPATIENT
Start: 2024-11-27 | End: 2024-12-12

## 2024-11-01 DIAGNOSIS — R11.2 NAUSEA AND VOMITING, UNSPECIFIED VOMITING TYPE: Primary | ICD-10-CM

## 2024-11-01 RX ORDER — FAMOTIDINE 20 MG/1
20 TABLET, FILM COATED ORAL DAILY
Qty: 14 TABLET | Refills: 0 | Status: SHIPPED | OUTPATIENT
Start: 2024-11-01

## 2024-11-08 ENCOUNTER — LAB REQUISITION (OUTPATIENT)
Dept: LAB | Facility: HOSPITAL | Age: 69
End: 2024-11-08
Payer: COMMERCIAL

## 2024-11-08 LAB
ANION GAP SERPL CALCULATED.3IONS-SCNC: 10 MMOL/L (ref 4–13)
BUN SERPL-MCNC: 62 MG/DL (ref 5–25)
CALCIUM SERPL-MCNC: 8.3 MG/DL (ref 8.4–10.2)
CHLORIDE SERPL-SCNC: 102 MMOL/L (ref 96–108)
CO2 SERPL-SCNC: 31 MMOL/L (ref 21–32)
CREAT SERPL-MCNC: 1.75 MG/DL (ref 0.6–1.3)
GFR SERPL CREATININE-BSD FRML MDRD: 39 ML/MIN/1.73SQ M
GLUCOSE SERPL-MCNC: 94 MG/DL (ref 65–140)
POTASSIUM SERPL-SCNC: 3.7 MMOL/L (ref 3.5–5.3)
SODIUM SERPL-SCNC: 143 MMOL/L (ref 135–147)

## 2024-11-08 PROCEDURE — 80048 BASIC METABOLIC PNL TOTAL CA: CPT | Performed by: INTERNAL MEDICINE

## 2024-11-11 ENCOUNTER — TELEPHONE (OUTPATIENT)
Dept: NEPHROLOGY | Facility: CLINIC | Age: 69
End: 2024-11-11

## 2024-11-11 NOTE — TELEPHONE ENCOUNTER
Called pt to offer 11/18 at 11am with Dr Vzaquez in the QO. Pt accepted the appt and we will add to the schedule. Thanks!

## 2024-11-11 NOTE — TELEPHONE ENCOUNTER
Pt's wife called in and stated that pt was just discharged from dialysis last Friday and was advised by Dr Vazquez to schedule an appt this week for SALLIE. No availability with Dr Vazquez until May. No availability with AP in the QO until Jan. Please advise for scheduling.

## 2024-11-11 NOTE — TELEPHONE ENCOUNTER
Sorry I didn't realize, this pt would need a consultation appt to be scheduled. He has not been seen in office or hospital. Needs to schedule with Dr Vazquez (or Dr Banks if that is ok), but the pt is requesting Dr Vazquez.

## 2024-11-12 ENCOUNTER — TELEPHONE (OUTPATIENT)
Dept: NEPHROLOGY | Facility: HOSPITAL | Age: 69
End: 2024-11-12

## 2024-11-12 DIAGNOSIS — E03.9 HYPOTHYROIDISM, UNSPECIFIED TYPE: ICD-10-CM

## 2024-11-12 DIAGNOSIS — I49.3 FREQUENT PVCS: Primary | ICD-10-CM

## 2024-11-12 NOTE — TELEPHONE ENCOUNTER
LVM for pt regarding the following message      Devonte Vazquez, DO   Creatinine is much improved.  Can you have him repeat a BMP and have him come in in [place] on November 18 at 11:30 AM during my admin time, he is discussed this with Niesha and Dang to open up the time slot thank you     Asked pt to give us a call back with any questions or concerns. Pt is scheduled for appointment I reminded him to get labs done prior the appointment.     Labs in [facility].

## 2024-11-12 NOTE — TELEPHONE ENCOUNTER
Pt called back. He needs the labs fax[facility].  Please call the pt once they are faxed.

## 2024-11-12 NOTE — TELEPHONE ENCOUNTER
Patient calling back after receiving a missed call. He confirmed his appointment for 11/18 and states he will get labs done tomorrow at the [facility] in [place].  BEKAH

## 2024-11-12 NOTE — TELEPHONE ENCOUNTER
Called pt and let him know I have faxed lab slips to [facility] in [place]. Pt verbalized understanding.

## 2024-11-13 ENCOUNTER — HOSPITAL ENCOUNTER (OUTPATIENT)
Dept: INTERVENTIONAL RADIOLOGY/VASCULAR | Facility: HOSPITAL | Age: 69
Discharge: HOME | End: 2024-11-13
Attending: INTERNAL MEDICINE
Payer: COMMERCIAL

## 2024-11-13 DIAGNOSIS — N18.6 CKD (CHRONIC KIDNEY DISEASE) REQUIRING CHRONIC DIALYSIS (HCC): ICD-10-CM

## 2024-11-13 DIAGNOSIS — Z99.2 CKD (CHRONIC KIDNEY DISEASE) REQUIRING CHRONIC DIALYSIS (HCC): ICD-10-CM

## 2024-11-13 PROCEDURE — 36589 REMOVAL TUNNELED CV CATH: CPT | Performed by: INTERNAL MEDICINE

## 2024-11-13 PROCEDURE — 36589 REMOVAL TUNNELED CV CATH: CPT

## 2024-11-13 NOTE — SEDATION DOCUMENTATION
Rt chest Permacath removal under sterile technique without difficulty. DSD placed. Pt tolerated procedure well.

## 2024-11-13 NOTE — DISCHARGE INSTRUCTIONS
Perma-cath Removal   WHAT YOU NEED TO KNOW:   A perma-cath is a catheter placed through a vein into or near your right atrium. Your right atrium is the right upper chamber of your heart. A perma-cath is used for dialysis in an emergency or until a long-term device is ready to use. Or you no longer need dialysis.    DISCHARGE INSTRUCTIONS:   Call 911 for any of the following:   You feel lightheaded, short of breath, and have chest pain.    You start bleeding    Contact Interventional Radiology at [phone]  if:  Blood soaks through your bandage.   You have new swelling in your arm, neck, face, or chest on your right side.  Your bruises or pain get worse.   You have a fever or chills.  Persistent nausea or vomiting.   Your incision is red, swollen, or draining pus.   You have questions or concerns about your condition or care.  Self-care:   Resume your normal diet. Small sips of flat soda will help with nausea.  Keep your dressings dry. Do not get your perma-cath site wet until the incision closes.  You may take a tub bath, but do not get your dressings wet. Water in your wound can cause bacteria to grow and cause an infection. If your dressing gets wet, remove the wet dressing and apply a dry bandaid. Keep it covered until the incision closes. This should only take a few days to heal. Do not use soaps or ointments.  Immediately after your catheter is removed, no strenuous activity for twenty four hours and stay in an upright sitting position for two hours.   Follow up with your healthcare provider as directed: Write down your questions so you remember to ask them during your visits.

## 2024-11-13 NOTE — BRIEF OP NOTE (RAD/CATH)
INTERVENTIONAL RADIOLOGY PROCEDURE NOTE    Date: 11/13/2024    Procedure:   Procedure Summary       Date: 11/13/24 Room / Location: [facility] Interventional Radiology    Anesthesia Start:  Anesthesia Stop:     Procedure: IR TUNNELED DIALYSIS CATHETER REMOVAL Diagnosis:       CKD (chronic kidney disease) requiring chronic dialysis (HCC)      (No longer needed)    Scheduled Providers:  Responsible Provider:     Anesthesia Type: Not recorded ASA Status: Not recorded            Preoperative diagnosis:   1. CKD (chronic kidney disease) requiring chronic dialysis (HCC)         Postoperative diagnosis: Same.    Surgeon: Jimy Mcdermott MD     Assistant: None. No qualified resident was available.    Blood loss: None    Specimens: None     Findings: Removal of right-sided dialysis catheter.    Complications: None immediate.    Anesthesia: local

## 2024-11-18 ENCOUNTER — CONSULT (OUTPATIENT)
Dept: NEPHROLOGY | Facility: HOSPITAL | Age: 69
End: 2024-11-18
Payer: COMMERCIAL

## 2024-11-18 VITALS
WEIGHT: 212.8 LBS | HEART RATE: 73 BPM | HEIGHT: 72 IN | SYSTOLIC BLOOD PRESSURE: 128 MMHG | DIASTOLIC BLOOD PRESSURE: 68 MMHG | OXYGEN SATURATION: 95 % | BODY MASS INDEX: 28.82 KG/M2

## 2024-11-18 DIAGNOSIS — N04.9 NEPHROTIC SYNDROME: Primary | ICD-10-CM

## 2024-11-18 DIAGNOSIS — K64.9 HEMORRHOIDS, UNSPECIFIED HEMORRHOID TYPE: ICD-10-CM

## 2024-11-18 DIAGNOSIS — N10 ACUTE INTERSTITIAL NEPHRITIS: ICD-10-CM

## 2024-11-18 DIAGNOSIS — C83.10 MANTLE CELL LYMPHOMA (HCC): ICD-10-CM

## 2024-11-18 DIAGNOSIS — R11.2 NAUSEA AND VOMITING, UNSPECIFIED VOMITING TYPE: ICD-10-CM

## 2024-11-18 DIAGNOSIS — N05.0 MINIMAL CHANGE DISEASE: ICD-10-CM

## 2024-11-18 DIAGNOSIS — E08.21 DIABETES MELLITUS DUE TO UNDERLYING CONDITION WITH DIABETIC NEPHROPATHY, WITHOUT LONG-TERM CURRENT USE OF INSULIN (HCC): ICD-10-CM

## 2024-11-18 PROCEDURE — 99204 OFFICE O/P NEW MOD 45 MIN: CPT | Performed by: INTERNAL MEDICINE

## 2024-11-18 RX ORDER — CHOLECALCIFEROL (VITAMIN D3) 25 MCG
2000 TABLET ORAL DAILY
Qty: 180 TABLET | Refills: 3 | Status: SHIPPED | OUTPATIENT
Start: 2024-11-18

## 2024-11-18 RX ORDER — ATOVAQUONE 750 MG/5ML
1500 SUSPENSION ORAL
COMMUNITY
Start: 2024-10-20 | End: 2024-11-18 | Stop reason: SDUPTHER

## 2024-11-18 RX ORDER — CYANOCOBALAMIN (VITAMIN B-12) 500 MCG
1000 TABLET ORAL DAILY
Qty: 180 TABLET | Refills: 3 | Status: SHIPPED | OUTPATIENT
Start: 2024-11-18

## 2024-11-18 RX ORDER — ECHINACEA PURPUREA EXTRACT 125 MG
2 TABLET ORAL
COMMUNITY
Start: 2024-10-19

## 2024-11-18 RX ORDER — SENNOSIDES 8.6 MG
8.6 TABLET ORAL AS NEEDED
COMMUNITY
Start: 2024-10-19

## 2024-11-18 RX ORDER — FAMOTIDINE 20 MG/1
20 TABLET, FILM COATED ORAL DAILY
Qty: 90 TABLET | Refills: 3 | Status: SHIPPED | OUTPATIENT
Start: 2024-11-18

## 2024-11-18 RX ORDER — LOSARTAN POTASSIUM 25 MG/1
25 TABLET ORAL DAILY
Qty: 30 TABLET | Refills: 11 | Status: SHIPPED | OUTPATIENT
Start: 2024-11-18 | End: 2025-11-18

## 2024-11-18 RX ORDER — ATOVAQUONE 750 MG/5ML
1500 SUSPENSION ORAL DAILY
Qty: 300 ML | Refills: 0 | Status: SHIPPED | OUTPATIENT
Start: 2024-11-18 | End: 2024-12-18

## 2024-11-18 RX ORDER — MULTIVITAMIN WITH IRON
100 TABLET ORAL DAILY
Qty: 90 TABLET | Refills: 3 | Status: SHIPPED | OUTPATIENT
Start: 2024-11-18 | End: 2024-11-18

## 2024-11-18 RX ORDER — ACETAMINOPHEN 325 MG/1
TABLET ORAL EVERY 6 HOURS PRN
COMMUNITY
Start: 2024-10-19

## 2024-11-18 RX ORDER — AMLODIPINE BESYLATE 10 MG/1
10 TABLET ORAL DAILY
COMMUNITY
Start: 2024-10-20 | End: 2024-11-18

## 2024-11-18 RX ORDER — LOSARTAN POTASSIUM 25 MG/1
25 TABLET ORAL DAILY
COMMUNITY
Start: 2024-10-23 | End: 2024-11-18 | Stop reason: SDUPTHER

## 2024-11-18 RX ORDER — PREDNISONE 10 MG/1
TABLET ORAL
Qty: 75 TABLET | Refills: 0 | Status: SHIPPED | OUTPATIENT
Start: 2024-11-18 | End: 2024-12-12

## 2024-11-18 RX ORDER — SEVELAMER CARBONATE 800 MG/1
1600 TABLET, FILM COATED ORAL
COMMUNITY

## 2024-11-18 RX ORDER — PREDNISONE 1 MG/1
TABLET ORAL
Qty: 43 TABLET | Refills: 0 | Status: SHIPPED | OUTPATIENT
Start: 2024-11-27 | End: 2024-12-11

## 2024-11-18 RX ORDER — CALCIUM CARBONATE 500 MG/1
1 TABLET, CHEWABLE ORAL DAILY
COMMUNITY
Start: 2024-10-19

## 2024-11-18 NOTE — ASSESSMENT & PLAN NOTE
Ongoing follow-up with the [facility]  Orders:    Basic metabolic panel; Future

## 2024-11-18 NOTE — PROGRESS NOTES
OFFICE CONSULT - Nephrology   Angel Escoto 69 y.o. male MRN: [id number]    Encounter: [id number]      ASSESSMENT AND PLAN    {Assess/PlanSmartLinks:28327}    DISCUSSION:      HPI:  Angel Escoto is a 69 y.o.male who was referred by {POD REFERRING DR:20884} for evaluation of No chief complaint on file.  .  ***    I personally spent over half of a total *** minutes face to face with the patient in counseling and discussion and/or coordination of care as described above.    ROS:    Review of Systems    Allergies: Hydromorphone and Medical tape    Medications:   Current Outpatient Medications:     acetaminophen (TYLENOL) 325 mg tablet, every 6 (six) hours as needed, Disp: , Rfl:     atovaquone (MEPRON) 750 mg/5 mL suspension, Take 10 mL (1,500 mg total) by mouth daily, Disp: 300 mL, Rfl: 0    calcium carbonate (TUMS) 500 mg chewable tablet, Chew 1 tablet daily, Disp: , Rfl:     cholecalciferol (VITAMIN D3) 1,000 units tablet, Take 2 tablets (2,000 Units total) by mouth daily, Disp: 180 tablet, Rfl: 3    cyanocobalamin (VITAMIN B-12) 100 mcg tablet, Take 750 mcg by mouth daily  , Disp: , Rfl:     DULoxetine (CYMBALTA) 20 mg capsule, Take 20 mg by mouth daily, Disp: , Rfl:     famotidine (PEPCID) 20 mg tablet, Take 1 tablet (20 mg total) by mouth in the morning, Disp: 90 tablet, Rfl: 3    levothyroxine 100 mcg tablet, TAKE 1 TABLET BY MOUTH DAILY, Disp: , Rfl:     losartan (COZAAR) 25 mg tablet, Take 1 tablet (25 mg total) by mouth daily, Disp: 30 tablet, Rfl: 11    [START ON 11/27/2024] predniSONE 1 mg tablet, Take 5 tablets (5 mg total) by mouth daily for 5 days, THEN 2.5 tablets (2.5 mg total) daily for 5 days, THEN 1 tablet (1 mg total) daily for 5 days. Do not start before November 27, 2024., Disp: 43 tablet, Rfl: 0    predniSONE 10 mg tablet, Take 5 tablets (50 mg total) by mouth daily for 5 days, THEN 4 tablets (40 mg total) daily for 5 days, THEN 3 tablets (30 mg total) daily for 5 days, THEN 2 tablets (20 mg  total) daily for 5 days, THEN 1 tablet (10 mg total) daily for 5 days., Disp: 75 tablet, Rfl: 0    prochlorperazine (COMPAZINE) 5 mg tablet, Take 1 tablet (5 mg total) by mouth every 6 (six) hours as needed for nausea or vomiting, Disp: 60 tablet, Rfl: 3    senna (SENOKOT) 8.6 mg, Take 8.6 mg by mouth as needed, Disp: , Rfl:     sevelamer carbonate (RENVELA) 800 mg tablet, Take 1,600 mg by mouth 3 (three) times a day with meals, Disp: , Rfl:     torsemide (DEMADEX) 100 mg tablet, Take 1 tablet (100 mg total) by mouth daily, Disp: 30 tablet, Rfl: 3    vitamin B-12 (CYANOCOBALAMIN) 500 MCG TABS, Take 2 tablets (1,000 mcg total) by mouth daily, Disp: 180 tablet, Rfl: 3    Ascorbic Acid (VITAMIN C) 100 MG tablet, Take 100 mg by mouth daily (Patient not taking: Reported on 5/13/2024), Disp: , Rfl:     b complex vitamins capsule, Take 1 capsule by mouth daily (Patient not taking: Reported on 5/13/2024), Disp: , Rfl:     MAGNESIUM PO, Take 1 tablet by mouth daily (Patient not taking: Reported on 11/18/2024), Disp: , Rfl:     Pyridoxine HCl (VITAMIN B6 PO), Take by mouth daily (Patient not taking: Reported on 11/18/2024), Disp: , Rfl:     Saw Palmetto, Serenoa repens, (SAW PALMETTO CONCENTRATE PO), Take by mouth (Patient not taking: Reported on 5/13/2024), Disp: , Rfl:     sodium chloride (OCEAN) 0.65 % nasal spray, 2 sprays into each nostril (Patient not taking: Reported on 11/18/2024), Disp: , Rfl:     tadalafil (CIALIS) 5 MG tablet, Take 5 mg by mouth daily as needed, Disp: , Rfl:     vitamin E 100 UNIT capsule, Take 100 Units by mouth daily (Patient not taking: Reported on 5/13/2024), Disp: , Rfl:     Past Medical History:   Diagnosis Date    Disease of thyroid gland      Past Surgical History:   Procedure Laterality Date    ABLATION OF DYSRHYTHMIC FOCUS      IR TUNNELED DIALYSIS CATHETER REMOVAL  11/13/2024     History reviewed. No pertinent family history.   reports that he has never smoked. He has never used  "smokeless tobacco. He reports current alcohol use. He reports that he does not use drugs.      OBJECTIVE:    Vitals:    11/18/24 1102   BP: 128/68   Pulse: 73   SpO2: 95%   Weight: 96.5 kg (212 lb 12.8 oz)   Height: 5' 11.75\" (1.822 m)        Body mass index is 29.06 kg/m².     [unfilled]     Weight (last 2 days)       Date/Time Weight    11/18/24 1102 96.5 (212.8)             Physical exam:  Physical Exam      Data Review   No results found for this or any previous visit.           Invalid input(s): \"ALBUMIN\"          Portions of the record may have been created with voice recognition software. Occasional wrong word or \"sound a like\" substitutions may have occurred due to the inherent limitations of voice recognition software. Read the chart carefully and recognize, using context, where substitutions have occurred.If you have any questions, please contact the dictating provider.  "

## 2024-11-18 NOTE — PROGRESS NOTES
Name: Angel Escoto      : 1955      MRN: [id number]  Encounter Provider: Devonte Vazquez DO  Encounter Date: 2024   Encounter department: 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 Kel Isaac at the [facility] and was last seen via telemedicine on on  he will then see him on   I did speak with Dr. Isaac on the phone he has been collaborating with the patient's oncologist.  This instance appears to be a minimal-change disease that has been exacerbated by his mantle cell lymphoma which is prompting them to start treatment.  Patient will be started on Rituxan starting next week  For continuity of care patient should continue to follow with Dr. Isaac for his nephrology needs who is working closely with the patient's oncologist at the [facility] and this was discussed with the patient in detail  Given his lower extremity edema we will hold amlodipine and start losartan 25 mg daily, check blood work regularly.  Will put in for a BMP but if Yoandy has blood work scheduled he should continue with that  He is on torsemide 100 mg daily this will likely be down titrated as his edema improves and his nephrotic  syndrome improves he is making about 3.5 L of urine a day    Orders:    losartan (COZAAR) 25 mg tablet; Take 1 tablet (25 mg total) by mouth daily    Ambulatory Referral to Colorectal Surgery; Future    cholecalciferol (VITAMIN D3) 1,000 units tablet; Take 2 tablets (2,000 Units total) by mouth daily    vitamin B-12 (CYANOCOBALAMIN) 500 MCG TABS; Take 2 tablets (1,000 mcg total) by mouth daily    atovaquone (MEPRON) 750 mg/5 mL suspension; Take 10 mL (1,500 mg total) by mouth daily    Basic metabolic panel; Future    Hemorrhoids, unspecified hemorrhoid type  Patient is having significant hemorrhoids I placed a referral to our colorectal surgeons for evaluation    Nausea and vomiting, unspecified vomiting type  This is now improved and he is on Compazine but has not required this    Orders:    famotidine (PEPCID) 20 mg tablet; Take 1 tablet (20 mg total) by mouth in the morning    vitamin B-12 (CYANOCOBALAMIN) 500 MCG TABS; Take 2 tablets (1,000 mcg total) by mouth daily    atovaquone (MEPRON) 750 mg/5 mL suspension; Take 10 mL (1,500 mg total) by mouth daily    Basic metabolic panel; Future    Acute interstitial nephritis  He is currently on a steroid taper and his biopsy did report this  Orders:    predniSONE 10 mg tablet; Take 5 tablets (50 mg total) by mouth daily for 5 days, THEN 4 tablets (40 mg total) daily for 5 days, THEN 3 tablets (30 mg total) daily for 5 days, THEN 2 tablets (20 mg total) daily for 5 days, THEN 1 tablet (10 mg total) daily for 5 days.    predniSONE 1 mg tablet; Take 5 tablets (5 mg total) by mouth daily for 5 days, THEN 2.5 tablets (2.5 mg total) daily for 5 days, THEN 1 tablet (1 mg total) daily for 5 days. Do not start before November 27, 2024.    atovaquone (MEPRON) 750 mg/5 mL suspension; Take 10 mL (1,500 mg total) by mouth daily    Basic metabolic panel; Future    Minimal change disease  He has minimal-change disease likely secondary to his mantle cell lymphoma he is on a prednisone  taper with initiation of rituximab [facility]  He is working with the [facility] we will be initiating rituximab next week    Mantle cell lymphoma (HCC)  Ongoing follow-up with the [facility]  Orders:    Basic metabolic panel; Future    We will see the patient as needed  He was in agreement and had no further questions  He should obtain blood work per the recommendations laid forth by [place]    History of Present Illness     Angel Escoto is a 69 y.o. male who presents for evaluation.  He has a history of mantle cell lymphoma that has remained indolent for 7 years, he presented after a recent trip to [place] with nephrotic syndrome and acute kidney injury he had a renal biopsy that showed minimal-change disease with acute interstitial nephritis he was started on steroids and dialysis he did have renal recovery he is currently on prednisone 20 mg daily.  He was seeing oncology and nephrology at the [facility], his biopsy results were negative for mantle cell in the kidneys however there was concerns that the nephrotic syndrome had contributed to the patient's nephrotic syndrome.  He subsequently presented as an outpatient to the dialysis unit he required dialysis for approximately 2 weeks he was placed on prednisone and then subsequently a prednisone taper, he currently denies any acute chest pain or shortness of breath, no fevers or chills, no nausea vomiting.  He is urinating without difficulty, he has some lower extremity edema, this is improving his weight is improving    History obtained from: patient    Review of Systems   Constitutional:  Negative for chills and fever.   HENT:  Negative for ear pain and sore throat.    Eyes:  Negative for pain and visual disturbance.   Respiratory:  Negative for cough and shortness of breath.    Cardiovascular:  Negative for chest pain and palpitations.   Gastrointestinal:  Negative for abdominal pain and  vomiting.   Genitourinary:  Negative for dysuria and hematuria.   Musculoskeletal:  Negative for arthralgias and back pain.   Skin:  Negative for color change and rash.   Neurological:  Negative for seizures and syncope.   All other systems reviewed and are negative.    Past Medical History   Past Medical History:   Diagnosis Date    Disease of thyroid gland      Past Surgical History:   Procedure Laterality Date    ABLATION OF DYSRHYTHMIC FOCUS      IR TUNNELED DIALYSIS CATHETER REMOVAL  11/13/2024     History reviewed. No pertinent family history.   reports that he has never smoked. He has never used smokeless tobacco. He reports current alcohol use. He reports that he does not use drugs.  Current Outpatient Medications on File Prior to Visit   Medication Sig Dispense Refill    acetaminophen (TYLENOL) 325 mg tablet every 6 (six) hours as needed      calcium carbonate (TUMS) 500 mg chewable tablet Chew 1 tablet daily      cyanocobalamin (VITAMIN B-12) 100 mcg tablet Take 750 mcg by mouth daily        DULoxetine (CYMBALTA) 20 mg capsule Take 20 mg by mouth daily      levothyroxine 100 mcg tablet TAKE 1 TABLET BY MOUTH DAILY      prochlorperazine (COMPAZINE) 5 mg tablet Take 1 tablet (5 mg total) by mouth every 6 (six) hours as needed for nausea or vomiting 60 tablet 3    senna (SENOKOT) 8.6 mg Take 8.6 mg by mouth as needed      sevelamer carbonate (RENVELA) 800 mg tablet Take 1,600 mg by mouth 3 (three) times a day with meals      torsemide (DEMADEX) 100 mg tablet Take 1 tablet (100 mg total) by mouth daily 30 tablet 3    [DISCONTINUED] amLODIPine (NORVASC) 10 mg tablet Take 10 mg by mouth daily      [DISCONTINUED] atovaquone (MEPRON) 750 mg/5 mL suspension Take 1,500 mg by mouth      [DISCONTINUED] cholecalciferol (VITAMIN D3) 1,000 units tablet Take 1,000 Units by mouth daily      [DISCONTINUED] famotidine (PEPCID) 20 mg tablet Take 1 tablet (20 mg total) by mouth in the morning 14 tablet 0    [DISCONTINUED]  predniSONE 10 mg tablet Take 5 tablets (50 mg total) by mouth daily for 5 days, THEN 4 tablets (40 mg total) daily for 5 days, THEN 3 tablets (30 mg total) daily for 5 days, THEN 2 tablets (20 mg total) daily for 5 days, THEN 1 tablet (10 mg total) daily for 5 days. Do not start before November 2, 2024. 75 tablet 0    Ascorbic Acid (VITAMIN C) 100 MG tablet Take 100 mg by mouth daily (Patient not taking: Reported on 5/13/2024)      b complex vitamins capsule Take 1 capsule by mouth daily (Patient not taking: Reported on 5/13/2024)      MAGNESIUM PO Take 1 tablet by mouth daily (Patient not taking: Reported on 11/18/2024)      Pyridoxine HCl (VITAMIN B6 PO) Take by mouth daily (Patient not taking: Reported on 11/18/2024)      Saw Palmetto, Serenoa repens, (SAW PALMETTO CONCENTRATE PO) Take by mouth (Patient not taking: Reported on 5/13/2024)      sodium chloride (OCEAN) 0.65 % nasal spray 2 sprays into each nostril (Patient not taking: Reported on 11/18/2024)      tadalafil (CIALIS) 5 MG tablet Take 5 mg by mouth daily as needed      vitamin E 100 UNIT capsule Take 100 Units by mouth daily (Patient not taking: Reported on 5/13/2024)      [DISCONTINUED] losartan (COZAAR) 25 mg tablet Take 25 mg by mouth daily (Patient not taking: Reported on 11/18/2024)      [DISCONTINUED] predniSONE 1 mg tablet Take 5 tablets (5 mg total) by mouth daily for 5 days, THEN 2.5 tablets (2.5 mg total) daily for 5 days, THEN 1 tablet (1 mg total) daily for 5 days. Do not start before November 27, 2024. (Patient not taking: Reported on 11/18/2024) 43 tablet 0     No current facility-administered medications on file prior to visit.     Allergies   Allergen Reactions    Hydromorphone Hallucinations, Other (See Comments), Delirium, Confusion and Visual Disturbance     hallucinosis    Patient saw things that were not there    Other reaction(s): Delirium, Hallucinations, Mental Status Changes, Other (see comments), Visual Disturbance    hallucinosis   hallucinosis   hallucinosis   hallucinosis   Patient saw things that were not there    hallucinosis   hallucinosis   Patient saw things that were not there    hallucinosis   hallucinosis   hallucinosis   hallucinosis   Patient saw things that were not there    hallucinosis   hallucinosis   hallucinosis   hallucinosis   hallucinosis   hallucinosis   Patient saw things that were not there    hallucinosis   hallucinosis   Patient saw things that were not there    hallucinosis    Medical Tape Rash     Itches    Skin irritate with EKG leads             Medical History Reviewed by provider this encounter:     .  Past Medical History   Past Medical History:   Diagnosis Date    Disease of thyroid gland      Past Surgical History:   Procedure Laterality Date    ABLATION OF DYSRHYTHMIC FOCUS      IR TUNNELED DIALYSIS CATHETER REMOVAL  11/13/2024     History reviewed. No pertinent family history.   reports that he has never smoked. He has never used smokeless tobacco. He reports current alcohol use. He reports that he does not use drugs.  Current Outpatient Medications on File Prior to Visit   Medication Sig Dispense Refill    acetaminophen (TYLENOL) 325 mg tablet every 6 (six) hours as needed      calcium carbonate (TUMS) 500 mg chewable tablet Chew 1 tablet daily      cyanocobalamin (VITAMIN B-12) 100 mcg tablet Take 750 mcg by mouth daily        DULoxetine (CYMBALTA) 20 mg capsule Take 20 mg by mouth daily      levothyroxine 100 mcg tablet TAKE 1 TABLET BY MOUTH DAILY      prochlorperazine (COMPAZINE) 5 mg tablet Take 1 tablet (5 mg total) by mouth every 6 (six) hours as needed for nausea or vomiting 60 tablet 3    senna (SENOKOT) 8.6 mg Take 8.6 mg by mouth as needed      sevelamer carbonate (RENVELA) 800 mg tablet Take 1,600 mg by mouth 3 (three) times a day with meals      torsemide (DEMADEX) 100 mg tablet Take 1 tablet (100 mg total) by mouth daily 30 tablet 3    [DISCONTINUED] amLODIPine (NORVASC) 10 mg  tablet Take 10 mg by mouth daily      [DISCONTINUED] atovaquone (MEPRON) 750 mg/5 mL suspension Take 1,500 mg by mouth      [DISCONTINUED] cholecalciferol (VITAMIN D3) 1,000 units tablet Take 1,000 Units by mouth daily      [DISCONTINUED] famotidine (PEPCID) 20 mg tablet Take 1 tablet (20 mg total) by mouth in the morning 14 tablet 0    [DISCONTINUED] predniSONE 10 mg tablet Take 5 tablets (50 mg total) by mouth daily for 5 days, THEN 4 tablets (40 mg total) daily for 5 days, THEN 3 tablets (30 mg total) daily for 5 days, THEN 2 tablets (20 mg total) daily for 5 days, THEN 1 tablet (10 mg total) daily for 5 days. Do not start before November 2, 2024. 75 tablet 0    Ascorbic Acid (VITAMIN C) 100 MG tablet Take 100 mg by mouth daily (Patient not taking: Reported on 5/13/2024)      b complex vitamins capsule Take 1 capsule by mouth daily (Patient not taking: Reported on 5/13/2024)      MAGNESIUM PO Take 1 tablet by mouth daily (Patient not taking: Reported on 11/18/2024)      Pyridoxine HCl (VITAMIN B6 PO) Take by mouth daily (Patient not taking: Reported on 11/18/2024)      Saw Palmetto, Serenoa repens, (SAW PALMETTO CONCENTRATE PO) Take by mouth (Patient not taking: Reported on 5/13/2024)      sodium chloride (OCEAN) 0.65 % nasal spray 2 sprays into each nostril (Patient not taking: Reported on 11/18/2024)      tadalafil (CIALIS) 5 MG tablet Take 5 mg by mouth daily as needed      vitamin E 100 UNIT capsule Take 100 Units by mouth daily (Patient not taking: Reported on 5/13/2024)      [DISCONTINUED] losartan (COZAAR) 25 mg tablet Take 25 mg by mouth daily (Patient not taking: Reported on 11/18/2024)      [DISCONTINUED] predniSONE 1 mg tablet Take 5 tablets (5 mg total) by mouth daily for 5 days, THEN 2.5 tablets (2.5 mg total) daily for 5 days, THEN 1 tablet (1 mg total) daily for 5 days. Do not start before November 27, 2024. (Patient not taking: Reported on 11/18/2024) 43 tablet 0     No current  facility-administered medications on file prior to visit.     Allergies   Allergen Reactions    Hydromorphone Hallucinations, Other (See Comments), Delirium, Confusion and Visual Disturbance     hallucinosis    Patient saw things that were not there    Other reaction(s): Delirium, Hallucinations, Mental Status Changes, Other (see comments), Visual Disturbance   hallucinosis   hallucinosis   hallucinosis   hallucinosis   Patient saw things that were not there    hallucinosis   hallucinosis   Patient saw things that were not there    hallucinosis   hallucinosis   hallucinosis   hallucinosis   Patient saw things that were not there    hallucinosis   hallucinosis   hallucinosis   hallucinosis   hallucinosis   hallucinosis   Patient saw things that were not there    hallucinosis   hallucinosis   Patient saw things that were not there    hallucinosis    Medical Tape Rash     Itches    Skin irritate with EKG leads      Current Outpatient Medications on File Prior to Visit   Medication Sig Dispense Refill    acetaminophen (TYLENOL) 325 mg tablet every 6 (six) hours as needed      calcium carbonate (TUMS) 500 mg chewable tablet Chew 1 tablet daily      cyanocobalamin (VITAMIN B-12) 100 mcg tablet Take 750 mcg by mouth daily        DULoxetine (CYMBALTA) 20 mg capsule Take 20 mg by mouth daily      levothyroxine 100 mcg tablet TAKE 1 TABLET BY MOUTH DAILY      prochlorperazine (COMPAZINE) 5 mg tablet Take 1 tablet (5 mg total) by mouth every 6 (six) hours as needed for nausea or vomiting 60 tablet 3    senna (SENOKOT) 8.6 mg Take 8.6 mg by mouth as needed      sevelamer carbonate (RENVELA) 800 mg tablet Take 1,600 mg by mouth 3 (three) times a day with meals      torsemide (DEMADEX) 100 mg tablet Take 1 tablet (100 mg total) by mouth daily 30 tablet 3    [DISCONTINUED] amLODIPine (NORVASC) 10 mg tablet Take 10 mg by mouth daily      [DISCONTINUED] atovaquone (MEPRON) 750 mg/5 mL suspension Take 1,500 mg by mouth       [DISCONTINUED] cholecalciferol (VITAMIN D3) 1,000 units tablet Take 1,000 Units by mouth daily      [DISCONTINUED] famotidine (PEPCID) 20 mg tablet Take 1 tablet (20 mg total) by mouth in the morning 14 tablet 0    [DISCONTINUED] predniSONE 10 mg tablet Take 5 tablets (50 mg total) by mouth daily for 5 days, THEN 4 tablets (40 mg total) daily for 5 days, THEN 3 tablets (30 mg total) daily for 5 days, THEN 2 tablets (20 mg total) daily for 5 days, THEN 1 tablet (10 mg total) daily for 5 days. Do not start before November 2, 2024. 75 tablet 0    Ascorbic Acid (VITAMIN C) 100 MG tablet Take 100 mg by mouth daily (Patient not taking: Reported on 5/13/2024)      b complex vitamins capsule Take 1 capsule by mouth daily (Patient not taking: Reported on 5/13/2024)      MAGNESIUM PO Take 1 tablet by mouth daily (Patient not taking: Reported on 11/18/2024)      Pyridoxine HCl (VITAMIN B6 PO) Take by mouth daily (Patient not taking: Reported on 11/18/2024)      Saw Palmetto, Serenoa repens, (SAW PALMETTO CONCENTRATE PO) Take by mouth (Patient not taking: Reported on 5/13/2024)      sodium chloride (OCEAN) 0.65 % nasal spray 2 sprays into each nostril (Patient not taking: Reported on 11/18/2024)      tadalafil (CIALIS) 5 MG tablet Take 5 mg by mouth daily as needed      vitamin E 100 UNIT capsule Take 100 Units by mouth daily (Patient not taking: Reported on 5/13/2024)      [DISCONTINUED] losartan (COZAAR) 25 mg tablet Take 25 mg by mouth daily (Patient not taking: Reported on 11/18/2024)      [DISCONTINUED] predniSONE 1 mg tablet Take 5 tablets (5 mg total) by mouth daily for 5 days, THEN 2.5 tablets (2.5 mg total) daily for 5 days, THEN 1 tablet (1 mg total) daily for 5 days. Do not start before November 27, 2024. (Patient not taking: Reported on 11/18/2024) 43 tablet 0     No current facility-administered medications on file prior to visit.      Social History     Tobacco Use    Smoking status: Never    Smokeless tobacco:  "Never   Vaping Use    Vaping status: Never Used   Substance and Sexual Activity    Alcohol use: Yes     Comment: rare    Drug use: No    Sexual activity: Not on file        Objective   /68   Pulse 73   Ht 5' 11.75\" (1.822 m)   Wt 96.5 kg (212 lb 12.8 oz)   SpO2 95%   BMI 29.06 kg/m²      Physical Exam  Vitals and nursing note reviewed.   Constitutional:       General: He is not in acute distress.     Appearance: He is well-developed.   HENT:      Head: Normocephalic and atraumatic.   Eyes:      Conjunctiva/sclera: Conjunctivae normal.   Cardiovascular:      Rate and Rhythm: Normal rate and regular rhythm.      Heart sounds: No murmur heard.  Pulmonary:      Effort: Pulmonary effort is normal. No respiratory distress.      Breath sounds: Normal breath sounds.   Abdominal:      Palpations: Abdomen is soft.      Tenderness: There is no abdominal tenderness.   Musculoskeletal:         General: No swelling. Normal range of motion.      Cervical back: Neck supple.   Skin:     General: Skin is warm and dry.      Capillary Refill: Capillary refill takes less than 2 seconds.   Neurological:      Mental Status: He is alert.   Psychiatric:         Mood and Affect: Mood normal.           "

## 2024-11-18 NOTE — PATIENT INSTRUCTIONS
"Patient Education     Chronic kidney disease   The Basics   Written by the doctors and editors at [facility]   What is chronic kidney disease? -- Chronic kidney disease, or \"CKD,\" is when the kidneys stop working as well as they should. When they are working normally, the kidneys filter blood and remove waste and excess salt and water (figure 1).  In people with CKD, the kidneys slowly lose the ability to filter blood. In time, the kidneys can stop working completely. That is why it is so important to keep CKD from getting worse.  What are the symptoms of CKD? -- At first, CKD causes no symptoms. As the disease gets worse, it can:   Make your feet, ankles, or legs swell (called \"edema\")   Give you high blood pressure   Make you very tired   Damage your bones  Will I need tests? -- Yes. Your doctor will want to see you regularly. You will probably have appointments at least once a year, and you will get regular tests to check your kidneys. These include blood and urine tests.  If your CKD gets worse over time, you will probably need to see a \"nephrologist.\" This is a doctor who takes care of people with kidney disease.  Is there anything I can do to keep my kidneys from getting worse? -- Yes. If you have CKD, you can protect your kidneys if you:   Take all of your prescribed medicines every day, and follow all of your doctor's instructions for how to take them.   Keep your blood sugar in a healthy range, if you have diabetes.   Change your diet, if your doctor or nurse recommends to. They might suggest working with a dietitian (nutrition expert).   Quit smoking, if you smoke.   Lose weight, if you have excess body weight.   Avoid medicines that can harm the kidneys - One example is nonsteroidal antiinflammatory drugs (\"NSAIDs\"). These medicines include ibuprofen (sample brand names: Advil, Motrin) and naproxen (sample brand name: Aleve). There are other medicines that people with CKD need to avoid, too. Check with your " "doctor, nurse, or kidney specialist before starting any new medicines or supplements, even those you can buy without a prescription.  How is CKD treated? -- People in the early stages of CKD can take medicines to keep the disease from getting worse. For example, many people with CKD should take medicines known as \"ACE inhibitors\" or \"angiotensin receptor blockers.\" If your doctor or nurse prescribes these medicines, it is very important that you take them every day as directed. If they cause side effects or cost too much, tell your doctor or nurse. They might have solutions to offer.  What happens if my kidneys stop working completely? -- If your kidneys can no longer filter blood properly, you can choose between 3 different treatments to take over their job. Your choices are:   Kidney transplant - After transplant surgery, the new kidney can do the job of your own kidneys. If you have a kidney transplant, you will need to take medicines for the rest of your life to keep your body from reacting badly to the new kidney. (You only need 1 kidney to live.)   Hemodialysis - This is a procedure in which a dialysis machine takes over the job of the kidneys. The machine pumps blood out of the body, filters it, and returns it to the body. If you choose hemodialysis, you will need to be hooked up to the machine at least 3 times a week for several hours for the rest of your life. Before you start, you will also need to have surgery to prepare a blood vessel for attachment to the machine.   Peritoneal dialysis - This involves piping a special fluid into the belly every day. If you choose peritoneal dialysis, you will need surgery to have a tube implanted in your belly. Then, you will have to learn how to pipe the fluid in and out through that tube.  How do I choose between the different treatment options? -- You and your doctor will need to work together to find a treatment that's right for you. Kidney transplant surgery is " usually the best option for most people. But often, there are no kidneys available for transplant.  Ask your doctor to explain all of your options and how they might work for you. Then, talk openly with them about how you feel about all of the options. You might even decide that you do not want any treatment. That is your choice.  All topics are updated as new evidence becomes available and our peer review process is complete.  This topic retrieved from [employer] on: May 18, 2024.  Topic 21540 Version 34.0  Release: 32.4.3 - C32.137  © 2024 [employer] and/or its affiliates. All rights reserved.  figure 1: Anatomy of the urinary tract     Urine is made by the kidneys. It passes from the kidneys into the bladder through 2 tubes called the ureters. Then, it leaves the bladder through another tube called the urethra.  Graphic 59935 Version 8.0  Consumer Information Use and Disclaimer   Disclaimer: This generalized information is a limited summary of diagnosis, treatment, and/or medication information. It is not meant to be comprehensive and should be used as a tool to help the user understand and/or assess potential diagnostic and treatment options. It does NOT include all information about conditions, treatments, medications, side effects, or risks that may apply to a specific patient. It is not intended to be medical advice or a substitute for the medical advice, diagnosis, or treatment of a health care provider based on the health care provider's examination and assessment of a patient's specific and unique circumstances. Patients must speak with a health care provider for complete information about their health, medical questions, and treatment options, including any risks or benefits regarding use of medications. This information does not endorse any treatments or medications as safe, effective, or approved for treating a specific patient. [employer] and its affiliates disclaim any warranty or liability  relating to this information or the use thereof.The use of this information is governed by the Terms of Use, available at https://www.wolterskluwer.com/en/know/clinical-effectiveness-terms. 2024© [employer], [employer] and its affiliates and/or licensors. All rights reserved.  Copyright   © 2024 [facility] and/or its affiliates. All rights reserved.

## 2024-11-18 NOTE — ASSESSMENT & PLAN NOTE
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

## 2024-11-18 NOTE — ASSESSMENT & PLAN NOTE
He is currently on a steroid taper and his biopsy did report this  Orders:    predniSONE 10 mg tablet; Take 5 tablets (50 mg total) by mouth daily for 5 days, THEN 4 tablets (40 mg total) daily for 5 days, THEN 3 tablets (30 mg total) daily for 5 days, THEN 2 tablets (20 mg total) daily for 5 days, THEN 1 tablet (10 mg total) daily for 5 days.    predniSONE 1 mg tablet; Take 5 tablets (5 mg total) by mouth daily for 5 days, THEN 2.5 tablets (2.5 mg total) daily for 5 days, THEN 1 tablet (1 mg total) daily for 5 days. Do not start before November 27, 2024.    atovaquone (MEPRON) 750 mg/5 mL suspension; Take 10 mL (1,500 mg total) by mouth daily    Basic metabolic panel; Future

## 2024-11-18 NOTE — ASSESSMENT & PLAN NOTE
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 Kel Isaac at the [facility] and was last seen via telemedicine on on October 24 he will then see him on December 9  I did speak with Dr. Isaac on the phone he has been collaborating with the patient's oncologist.  This instance appears to be a minimal-change disease that has been exacerbated by his mantle cell lymphoma which is prompting them to start treatment.  Patient will be started on Rituxan starting next week  For continuity of care patient should continue to follow with Dr. Isaac for his nephrology needs who is working closely with the patient's oncologist at the [facility] and this was discussed with the patient in detail  Given his lower extremity edema we will hold amlodipine and start losartan 25 mg daily, check blood work regularly.  Will put in for a BMP but if Yoandy has blood work scheduled he should continue with that  He is on torsemide 100 mg daily this will likely be down titrated as his edema improves and his nephrotic syndrome improves he is making about 3.5 L of urine a day    Orders:    losartan (COZAAR) 25 mg tablet; Take 1 tablet (25 mg total) by mouth daily    Ambulatory Referral to Colorectal Surgery; Future    cholecalciferol (VITAMIN D3) 1,000 units tablet; Take 2 tablets (2,000 Units total) by mouth daily    vitamin B-12 (CYANOCOBALAMIN) 500 MCG TABS; Take 2 tablets (1,000 mcg total)  by mouth daily    atovaquone (MEPRON) 750 mg/5 mL suspension; Take 10 mL (1,500 mg total) by mouth daily    Basic metabolic panel; Future

## 2024-12-03 ENCOUNTER — HOSPITAL ENCOUNTER (OUTPATIENT)
Facility: HOSPITAL | Age: 69
Setting detail: OBSERVATION
Discharge: HOME | End: 2024-12-05
Attending: EMERGENCY MEDICINE | Admitting: INTERNAL MEDICINE
Payer: COMMERCIAL

## 2024-12-03 ENCOUNTER — APPOINTMENT (EMERGENCY)
Dept: RADIOLOGY | Facility: HOSPITAL | Age: 69
End: 2024-12-03
Payer: COMMERCIAL

## 2024-12-03 ENCOUNTER — APPOINTMENT (EMERGENCY)
Dept: NON INVASIVE DIAGNOSTICS | Facility: HOSPITAL | Age: 69
End: 2024-12-03
Payer: COMMERCIAL

## 2024-12-03 DIAGNOSIS — K59.01 SLOW TRANSIT CONSTIPATION: ICD-10-CM

## 2024-12-03 DIAGNOSIS — C83.10 MANTLE CELL LYMPHOMA, UNSPECIFIED BODY REGION (HCC): ICD-10-CM

## 2024-12-03 DIAGNOSIS — R06.00 DYSPNEA: ICD-10-CM

## 2024-12-03 DIAGNOSIS — I82.409 DVT (DEEP VENOUS THROMBOSIS) (HCC): Primary | ICD-10-CM

## 2024-12-03 DIAGNOSIS — R79.89 ELEVATED D-DIMER: ICD-10-CM

## 2024-12-03 DIAGNOSIS — N18.9 ACUTE ON CHRONIC RENAL INSUFFICIENCY: ICD-10-CM

## 2024-12-03 DIAGNOSIS — N28.9 ACUTE ON CHRONIC RENAL INSUFFICIENCY: ICD-10-CM

## 2024-12-03 PROBLEM — I82.401 ACUTE DEEP VEIN THROMBOSIS (DVT) OF RIGHT LOWER EXTREMITY (HCC): Status: ACTIVE | Noted: 2024-12-03

## 2024-12-03 PROBLEM — D64.9 ANEMIA: Status: ACTIVE | Noted: 2024-12-03

## 2024-12-03 PROBLEM — N17.9 AKI (ACUTE KIDNEY INJURY) (HCC): Status: ACTIVE | Noted: 2024-12-03

## 2024-12-03 PROBLEM — R06.02 SOB (SHORTNESS OF BREATH): Status: ACTIVE | Noted: 2024-12-03

## 2024-12-03 PROBLEM — N18.4 CKD (CHRONIC KIDNEY DISEASE) STAGE 4, GFR 15-29 ML/MIN (HCC): Status: ACTIVE | Noted: 2024-12-03

## 2024-12-03 LAB
ALBUMIN SERPL BCG-MCNC: 2.8 G/DL (ref 3.5–5)
ALP SERPL-CCNC: 61 U/L (ref 34–104)
ALT SERPL W P-5'-P-CCNC: 16 U/L (ref 7–52)
ANION GAP SERPL CALCULATED.3IONS-SCNC: 4 MMOL/L (ref 4–13)
APTT PPP: 25 SECONDS (ref 23–34)
AST SERPL W P-5'-P-CCNC: 21 U/L (ref 13–39)
BASOPHILS # BLD AUTO: 0.06 THOUSANDS/ÂΜL (ref 0–0.1)
BASOPHILS NFR BLD AUTO: 1 % (ref 0–1)
BILIRUB SERPL-MCNC: 0.48 MG/DL (ref 0.2–1)
BNP SERPL-MCNC: 72 PG/ML (ref 0–100)
BUN SERPL-MCNC: 44 MG/DL (ref 5–25)
CALCIUM ALBUM COR SERPL-MCNC: 9.5 MG/DL (ref 8.3–10.1)
CALCIUM SERPL-MCNC: 8.5 MG/DL (ref 8.4–10.2)
CHLORIDE SERPL-SCNC: 104 MMOL/L (ref 96–108)
CO2 SERPL-SCNC: 30 MMOL/L (ref 21–32)
CREAT SERPL-MCNC: 2.35 MG/DL (ref 0.6–1.3)
D DIMER PPP FEU-MCNC: 14.14 UG/ML FEU
EOSINOPHIL # BLD AUTO: 0.14 THOUSAND/ÂΜL (ref 0–0.61)
EOSINOPHIL NFR BLD AUTO: 2 % (ref 0–6)
ERYTHROCYTE [DISTWIDTH] IN BLOOD BY AUTOMATED COUNT: 13.2 % (ref 11.6–15.1)
GFR SERPL CREATININE-BSD FRML MDRD: 27 ML/MIN/1.73SQ M
GLUCOSE SERPL-MCNC: 102 MG/DL (ref 65–140)
HCT VFR BLD AUTO: 28.2 % (ref 36.5–49.3)
HGB BLD-MCNC: 9 G/DL (ref 12–17)
IMM GRANULOCYTES # BLD AUTO: 0.12 THOUSAND/UL (ref 0–0.2)
IMM GRANULOCYTES NFR BLD AUTO: 2 % (ref 0–2)
INR PPP: 0.89 (ref 0.85–1.19)
LYMPHOCYTES # BLD AUTO: 1.51 THOUSANDS/ÂΜL (ref 0.6–4.47)
LYMPHOCYTES NFR BLD AUTO: 20 % (ref 14–44)
MCH RBC QN AUTO: 31.8 PG (ref 26.8–34.3)
MCHC RBC AUTO-ENTMCNC: 31.9 G/DL (ref 31.4–37.4)
MCV RBC AUTO: 100 FL (ref 82–98)
MONOCYTES # BLD AUTO: 0.59 THOUSAND/ÂΜL (ref 0.17–1.22)
MONOCYTES NFR BLD AUTO: 8 % (ref 4–12)
NEUTROPHILS # BLD AUTO: 5.23 THOUSANDS/ÂΜL (ref 1.85–7.62)
NEUTS SEG NFR BLD AUTO: 67 % (ref 43–75)
NRBC BLD AUTO-RTO: 0 /100 WBCS
PLATELET # BLD AUTO: 228 THOUSANDS/UL (ref 149–390)
PMV BLD AUTO: 8.6 FL (ref 8.9–12.7)
POTASSIUM SERPL-SCNC: 4.4 MMOL/L (ref 3.5–5.3)
PROT SERPL-MCNC: 5.4 G/DL (ref 6.4–8.4)
PROTHROMBIN TIME: 12.6 SECONDS (ref 12.3–15)
RBC # BLD AUTO: 2.83 MILLION/UL (ref 3.88–5.62)
SODIUM SERPL-SCNC: 138 MMOL/L (ref 135–147)
WBC # BLD AUTO: 7.65 THOUSAND/UL (ref 4.31–10.16)

## 2024-12-03 PROCEDURE — 99284 EMERGENCY DEPT VISIT MOD MDM: CPT

## 2024-12-03 PROCEDURE — 99222 1ST HOSP IP/OBS MODERATE 55: CPT | Performed by: INTERNAL MEDICINE

## 2024-12-03 PROCEDURE — 85730 THROMBOPLASTIN TIME PARTIAL: CPT | Performed by: EMERGENCY MEDICINE

## 2024-12-03 PROCEDURE — 85379 FIBRIN DEGRADATION QUANT: CPT | Performed by: EMERGENCY MEDICINE

## 2024-12-03 PROCEDURE — 99285 EMERGENCY DEPT VISIT HI MDM: CPT | Performed by: EMERGENCY MEDICINE

## 2024-12-03 PROCEDURE — 85025 COMPLETE CBC W/AUTO DIFF WBC: CPT | Performed by: EMERGENCY MEDICINE

## 2024-12-03 PROCEDURE — 85610 PROTHROMBIN TIME: CPT | Performed by: EMERGENCY MEDICINE

## 2024-12-03 PROCEDURE — 93005 ELECTROCARDIOGRAM TRACING: CPT

## 2024-12-03 PROCEDURE — 80053 COMPREHEN METABOLIC PANEL: CPT | Performed by: EMERGENCY MEDICINE

## 2024-12-03 PROCEDURE — 93971 EXTREMITY STUDY: CPT

## 2024-12-03 PROCEDURE — 36415 COLL VENOUS BLD VENIPUNCTURE: CPT | Performed by: EMERGENCY MEDICINE

## 2024-12-03 PROCEDURE — 71045 X-RAY EXAM CHEST 1 VIEW: CPT

## 2024-12-03 PROCEDURE — 83880 ASSAY OF NATRIURETIC PEPTIDE: CPT | Performed by: EMERGENCY MEDICINE

## 2024-12-03 RX ORDER — HEPARIN SODIUM 10000 [USP'U]/100ML
3-30 INJECTION, SOLUTION INTRAVENOUS
Status: DISCONTINUED | OUTPATIENT
Start: 2024-12-03 | End: 2024-12-04

## 2024-12-03 RX ORDER — PREDNISONE 1 MG/1
1 TABLET ORAL DAILY
Status: DISCONTINUED | OUTPATIENT
Start: 2024-12-07 | End: 2024-12-05 | Stop reason: HOSPADM

## 2024-12-03 RX ORDER — ACETAMINOPHEN 325 MG/1
650 TABLET ORAL EVERY 6 HOURS PRN
Status: DISCONTINUED | OUTPATIENT
Start: 2024-12-03 | End: 2024-12-05 | Stop reason: HOSPADM

## 2024-12-03 RX ORDER — CALCIUM CARBONATE 500 MG/1
500 TABLET, CHEWABLE ORAL DAILY PRN
Status: DISCONTINUED | OUTPATIENT
Start: 2024-12-03 | End: 2024-12-05 | Stop reason: HOSPADM

## 2024-12-03 RX ORDER — HEPARIN SODIUM 1000 [USP'U]/ML
3600 INJECTION, SOLUTION INTRAVENOUS; SUBCUTANEOUS EVERY 6 HOURS PRN
Status: DISCONTINUED | OUTPATIENT
Start: 2024-12-03 | End: 2024-12-04

## 2024-12-03 RX ORDER — SENNOSIDES 8.6 MG
1 TABLET ORAL DAILY PRN
Status: DISCONTINUED | OUTPATIENT
Start: 2024-12-03 | End: 2024-12-04

## 2024-12-03 RX ORDER — TORSEMIDE 20 MG/1
100 TABLET ORAL DAILY
Status: DISCONTINUED | OUTPATIENT
Start: 2024-12-04 | End: 2024-12-04

## 2024-12-03 RX ORDER — HEPARIN SODIUM 1000 [USP'U]/ML
7200 INJECTION, SOLUTION INTRAVENOUS; SUBCUTANEOUS EVERY 6 HOURS PRN
Status: DISCONTINUED | OUTPATIENT
Start: 2024-12-03 | End: 2024-12-04

## 2024-12-03 RX ORDER — TADALAFIL 5 MG/1
5 TABLET ORAL DAILY
Status: DISCONTINUED | OUTPATIENT
Start: 2024-12-03 | End: 2024-12-05 | Stop reason: HOSPADM

## 2024-12-03 RX ORDER — ATOVAQUONE 750 MG/5ML
1500 SUSPENSION ORAL
Status: DISCONTINUED | OUTPATIENT
Start: 2024-12-04 | End: 2024-12-04

## 2024-12-03 RX ORDER — HEPARIN SODIUM 1000 [USP'U]/ML
7200 INJECTION, SOLUTION INTRAVENOUS; SUBCUTANEOUS ONCE
Status: COMPLETED | OUTPATIENT
Start: 2024-12-03 | End: 2024-12-03

## 2024-12-03 RX ORDER — FAMOTIDINE 20 MG/1
20 TABLET, FILM COATED ORAL DAILY
Status: DISCONTINUED | OUTPATIENT
Start: 2024-12-03 | End: 2024-12-05 | Stop reason: HOSPADM

## 2024-12-03 RX ORDER — PREDNISONE 1 MG/1
2.5 TABLET ORAL DAILY
Status: DISCONTINUED | OUTPATIENT
Start: 2024-12-04 | End: 2024-12-05 | Stop reason: HOSPADM

## 2024-12-03 RX ORDER — LEVOTHYROXINE SODIUM 100 UG/1
100 TABLET ORAL
Status: DISCONTINUED | OUTPATIENT
Start: 2024-12-04 | End: 2024-12-05 | Stop reason: HOSPADM

## 2024-12-03 RX ORDER — LOSARTAN POTASSIUM 25 MG/1
25 TABLET ORAL DAILY
Status: DISCONTINUED | OUTPATIENT
Start: 2024-12-04 | End: 2024-12-04

## 2024-12-03 RX ADMIN — HEPARIN SODIUM 7200 UNITS: 1000 INJECTION INTRAVENOUS; SUBCUTANEOUS at 19:15

## 2024-12-03 RX ADMIN — HEPARIN SODIUM 18 UNITS/KG/HR: 10000 INJECTION, SOLUTION INTRAVENOUS at 19:14

## 2024-12-03 NOTE — Clinical Note
Case was discussed with GLENROY Silver and the patient's admission status was agreed to be Admission Status: observation status to the service of Dr. GLENROY Silver .

## 2024-12-03 NOTE — ED PROVIDER NOTES
Time reflects when diagnosis was documented in both MDM as applicable and the Disposition within this note       Time User Action Codes Description Comment    12/3/2024  7:00 PM Finn Dorantes Add [I82.409] DVT (deep venous thrombosis) (HCC)     12/3/2024  7:00 PM Finn Dorantes Modify [I82.409] DVT (deep venous thrombosis) (HCC) acute right leg    12/3/2024  7:00 PM Finn Dorantes Add [R06.00] Dyspnea     12/3/2024  7:01 PM Finn Dorantes Add [N28.9,  N18.9] Acute on chronic renal insufficiency     12/3/2024  7:01 PM Finn Dorantes Add [R79.89] Elevated d-dimer     12/3/2024  8:08 PM Romina Alexah Add [C83.10] Mantle cell lymphoma, unspecified body region (HCC)           ED Disposition       ED Disposition   No Disposition Selected    Condition   --    Date/Time   Tue Dec 3, 2024  7:05 PM    Comment   Case was discussed with GLENROY Silver and the patient's admission status was agreed to be Admission Status: observation status to the service of Dr. GLENROY Silver .               Assessment & Plan       Medical Decision Making  Diff includes PE, chf, pulm edema, right leg dvt, peripheral edema, will check for renal insufficiency    Amount and/or Complexity of Data Reviewed  Labs: ordered.  Radiology: ordered and independent interpretation performed.    Risk  Prescription drug management.        ED Course as of 12/04/24 0008   Tue Dec 03, 2024   1901 Reviewed new anemia with patient - he admits to feeling fatigue recently.  He denies any blood in stool, but he was having blood from hemorrhoids before this week.       Medications   heparin (porcine) 25,000 units in 0.45% NaCl 250 mL infusion (premix) (18 Units/kg/hr × 90 kg (Order-Specific) Intravenous New Bag 12/3/24 1914)   heparin (porcine) injection 7,200 Units (has no administration in time range)   heparin (porcine) injection 3,600 Units (has no administration in time range)   Cholecalciferol (VITAMIN D3) tablet 2,000 Units (has no administration in time range)   losartan  (COZAAR) tablet 25 mg (has no administration in time range)   famotidine (PEPCID) tablet 20 mg (20 mg Oral Not Given 12/3/24 2040)   calcium carbonate (TUMS) chewable tablet 500 mg (has no administration in time range)   levothyroxine tablet 100 mcg (has no administration in time range)   predniSONE tablet 2.5 mg (has no administration in time range)     Followed by   predniSONE tablet 1 mg (has no administration in time range)   senna (SENOKOT) tablet 8.6 mg (has no administration in time range)   tadalafil (CIALIS) tablet 5 mg (5 mg Oral Not Given 12/3/24 2223)   torsemide (DEMADEX) tablet 100 mg (has no administration in time range)   acetaminophen (TYLENOL) tablet 650 mg (has no administration in time range)   atovaquone (MEPRON) oral suspension 1,500 mg (has no administration in time range)   heparin (porcine) injection 7,200 Units (7,200 Units Intravenous Given 12/3/24 1915)       ED Risk Strat Scores                           SBIRT 22yo+      Flowsheet Row Most Recent Value   Initial Alcohol Screen: US AUDIT-C     1. How often do you have a drink containing alcohol? 0 Filed at: 12/03/2024 1634   2. How many drinks containing alcohol do you have on a typical day you are drinking?  0 Filed at: 12/03/2024 1634   3a. Male UNDER 65: How often do you have five or more drinks on one occasion? 0 Filed at: 12/03/2024 1634   3b. FEMALE Any Age, or MALE 65+: How often do you have 4 or more drinks on one occassion? 0 Filed at: 12/03/2024 1634   Audit-C Score 0 Filed at: 12/03/2024 1634   JOSÉ MIGUEL: How many times in the past year have you...    Used an illegal drug or used a prescription medication for non-medical reasons? Never Filed at: 12/03/2024 1634            Wells' Criteria for PE      Flowsheet Row Most Recent Value   Wells' Criteria for PE    Clinical signs and symptoms of DVT 3 Filed at: 12/03/2024 1646   PE is primary diagnosis or equally likely 0 Filed at: 12/03/2024 1646   HR >100 0 Filed at: 12/03/2024 1646    Immobilization at least 3 days or Surgery in the previous 4 weeks 0 Filed at: 12/03/2024 1646   Previous, objectively diagnosed PE or DVT 0 Filed at: 12/03/2024 1646   Hemoptysis 0 Filed at: 12/03/2024 1646   Malignancy with treatment within 6 months or palliative 1 Filed at: 12/03/2024 1646   Wells' Criteria Total 4 Filed at: 12/03/2024 1646          Wells' Criteria for DVT      Flowsheet Row Most Recent Value   Wells' Criteria for DVT    Active cancer Treatment or palliation within 6 months 1 Filed at: 12/03/2024 1647   Bedridden recently >3 days or major surgery within 12 weeks 0 Filed at: 12/03/2024 1647   Calf swelling >3 cm compared to the other leg 0 Filed at: 12/03/2024 1647   Entire leg swollen 0 Filed at: 12/03/2024 1647   Collateral (nonvaricose) superficial veins present 0 Filed at: 12/03/2024 1647   Localized tenderness along the deep venous system 1 Filed at: 12/03/2024 1647   Pitting edema, confined to symptomatic leg 1 Filed at: 12/03/2024 1647   Paralysis, paresis, or recent plaster immobilization of the lower extremity 0 Filed at: 12/03/2024 1647   Previously documented DVT 0 Filed at: 12/03/2024 1647   Alternative diagnosis to DVT as likely or more likely 0 Filed at: 12/03/2024 1647   Wells DVT Critera Score 3 Filed at: 12/03/2024 1647                      History of Present Illness       Chief Complaint   Patient presents with    Leg Swelling     Pt reports was dx with SALLIE. Pt losing weight but states right leg bigger than left and painful to touch       Past Medical History:   Diagnosis Date    Disease of thyroid gland       Past Surgical History:   Procedure Laterality Date    ABLATION OF DYSRHYTHMIC FOCUS      IR TUNNELED DIALYSIS CATHETER REMOVAL  11/13/2024      History reviewed. No pertinent family history.   Social History     Tobacco Use    Smoking status: Never    Smokeless tobacco: Never   Vaping Use    Vaping status: Never Used   Substance Use Topics    Alcohol use: Yes     Comment:  rare    Drug use: No      E-Cigarette/Vaping    E-Cigarette Use Never User       E-Cigarette/Vaping Substances    Nicotine No     THC No     CBD No     Flavoring No     Other No     Unknown No       I have reviewed and agree with the history as documented.     Hx from patient and medical records - [facility] kidney disease required temporary dialysis couple months ago.  Since then peripheral edema getting better in legs except for today noticed right lower leg painful at calf, redness and increased swelling.  Associated sob with deep breaths for about 1 month now.  Started chemo for new dx of mantle cell lymphoma shortly.        Review of Systems   Constitutional:  Positive for fatigue. Negative for chills and fever.   HENT:  Negative for rhinorrhea and sore throat.    Respiratory:  Positive for shortness of breath.    Cardiovascular:  Negative for chest pain.   Gastrointestinal:  Negative for constipation, diarrhea, nausea and vomiting.   Genitourinary:  Negative for dysuria and frequency.   Skin:  Positive for rash.   All other systems reviewed and are negative.          Objective       ED Triage Vitals [12/03/24 1635]   Temperature Pulse Blood Pressure Respirations SpO2 Patient Position - Orthostatic VS   98 °F (36.7 °C) 85 168/77 18 96 % Sitting      Temp Source Heart Rate Source BP Location FiO2 (%) Pain Score    Temporal Monitor Right arm -- --      Vitals      Date and Time Temp Pulse SpO2 Resp BP Pain Score FACES Pain Rating User   12/03/24 2200 -- 75 100 % 15 153/72 -- --    12/03/24 2100 -- 76 98 % 13 148/70 -- --    12/03/24 2030 -- 79 93 % 16 145/77 -- --    12/03/24 1930 -- 73 98 % 15 169/99 -- --    12/03/24 1915 -- 69 99 % 14 160/77 -- --    12/03/24 1800 -- 65 99 % 14 142/72 -- --    12/03/24 1635 98 °F (36.7 °C) 85 96 % 18 168/77 -- -- CM            Physical Exam  Vitals and nursing note reviewed.   Constitutional:       Appearance: He is well-developed.   HENT:      Head: Normocephalic and  atraumatic.      Right Ear: External ear normal.      Left Ear: External ear normal.      Nose: Nose normal.   Eyes:      Conjunctiva/sclera: Conjunctivae normal.      Pupils: Pupils are equal, round, and reactive to light.   Cardiovascular:      Rate and Rhythm: Normal rate and regular rhythm.      Heart sounds: Normal heart sounds.   Pulmonary:      Effort: Pulmonary effort is normal. No respiratory distress.      Breath sounds: Normal breath sounds. No wheezing.   Abdominal:      General: Bowel sounds are normal. There is no distension.      Palpations: Abdomen is soft.      Tenderness: There is no abdominal tenderness.   Musculoskeletal:         General: No deformity. Normal range of motion.      Cervical back: Normal range of motion and neck supple. No spinous process tenderness.      Right lower leg: Swelling and tenderness present. 3+ Edema present.      Right ankle: Swelling present. Normal pulse.      Comments: See media   Skin:     General: Skin is warm and dry.      Findings: No rash.   Neurological:      General: No focal deficit present.      Mental Status: He is alert.      GCS: GCS eye subscore is 4. GCS verbal subscore is 5. GCS motor subscore is 6.      Sensory: No sensory deficit.   Psychiatric:         Mood and Affect: Mood normal.         Results Reviewed       Procedure Component Value Units Date/Time    APTT [[id number]]     Lab Status: No result Specimen: Blood     D-Dimer [[id number]]  (Abnormal) Collected: 12/03/24 1702    Lab Status: Final result Specimen: Blood from Arm, Left Updated: 12/03/24 1809     D-Dimer, Quant 14.14 ug/ml FEU     Narrative:      In the evaluation for possible pulmonary embolism, in the appropriate (Well's Score of 4 or less) patient, the age adjusted d-dimer cutoff for this patient can be calculated as:    Age x 0.01 (in ug/mL) for Age-adjusted D-dimer exclusion threshold for a patient over 50 years.    B-Type Natriuretic Peptide(BNP) [[id number]]  (Normal) Collected:  12/03/24 1702    Lab Status: Final result Specimen: Blood from Arm, Left Updated: 12/03/24 1800     BNP 72 pg/mL     Protime-INR [[id number]]  (Normal) Collected: 12/03/24 1702    Lab Status: Final result Specimen: Blood from Arm, Left Updated: 12/03/24 1743     Protime 12.6 seconds      INR 0.89    Narrative:      INR Therapeutic Range    Indication                                             INR Range      Atrial Fibrillation                                               2.0-3.0  Hypercoagulable State                                    2.0.2.3  Left Ventricular Asist Device                            2.0-3.0  Mechanical Heart Valve                                  -    Aortic(with afib, MI, embolism, HF, LA enlargement,    and/or coagulopathy)                                     2.0-3.0 (2.5-3.5)     Mitral                                                             2.5-3.5  Prosthetic/Bioprosthetic Heart Valve               2.0-3.0  Venous thromboembolism (VTE: VT, PE        2.0-3.0    APTT [[id number]]  (Normal) Collected: 12/03/24 1702    Lab Status: Final result Specimen: Blood from Arm, Left Updated: 12/03/24 1743     PTT 25 seconds     Comprehensive metabolic panel [[id number]]  (Abnormal) Collected: 12/03/24 1702    Lab Status: Final result Specimen: Blood from Arm, Left Updated: 12/03/24 1727     Sodium 138 mmol/L      Potassium 4.4 mmol/L      Chloride 104 mmol/L      CO2 30 mmol/L      ANION GAP 4 mmol/L      BUN 44 mg/dL      Creatinine 2.35 mg/dL      Glucose 102 mg/dL      Calcium 8.5 mg/dL      Corrected Calcium 9.5 mg/dL      AST 21 U/L      ALT 16 U/L      Alkaline Phosphatase 61 U/L      Total Protein 5.4 g/dL      Albumin 2.8 g/dL      Total Bilirubin 0.48 mg/dL      eGFR 27 ml/min/1.73sq m     Narrative:      National Kid[employer] Disease Foundation guidelines for Chronic Kidney Disease (CKD):     Stage 1 with normal or high GFR (GFR > 90 mL/min/1.73 square meters)    Stage 2 Mild CKD (GFR = 60-89  mL/min/1.73 square meters)    Stage 3A Moderate CKD (GFR = 45-59 mL/min/1.73 square meters)    Stage 3B Moderate CKD (GFR = 30-44 mL/min/1.73 square meters)    Stage 4 Severe CKD (GFR = 15-29 mL/min/1.73 square meters)    Stage 5 End Stage CKD (GFR <15 mL/min/1.73 square meters)  Note: GFR calculation is accurate only with a steady state creatinine    CBC and differential [[id number]]  (Abnormal) Collected: 12/03/24 1702    Lab Status: Final result Specimen: Blood from Arm, Left Updated: 12/03/24 1712     WBC 7.65 Thousand/uL      RBC 2.83 Million/uL      Hemoglobin 9.0 g/dL      Hematocrit 28.2 %       fL      MCH 31.8 pg      MCHC 31.9 g/dL      RDW 13.2 %      MPV 8.6 fL      Platelets 228 Thousands/uL      nRBC 0 /100 WBCs      Segmented % 67 %      Immature Grans % 2 %      Lymphocytes % 20 %      Monocytes % 8 %      Eosinophils Relative 2 %      Basophils Relative 1 %      Absolute Neutrophils 5.23 Thousands/µL      Absolute Immature Grans 0.12 Thousand/uL      Absolute Lymphocytes 1.51 Thousands/µL      Absolute Monocytes 0.59 Thousand/µL      Eosinophils Absolute 0.14 Thousand/µL      Basophils Absolute 0.06 Thousands/µL             XR chest 1 view portable   ED Interpretation by Finn Dorantes DO (12/03 1728)   No acute abnl, no ptx, effusion, infiltrate, no obvious rib fracture, no widened mediastinum.  Interpreted by me              VAS VENOUS DUPLEX -LOWER LIMB UNILATERAL    (Results Pending)   NM inpatient order    (Results Pending)   Venous duplex acute right DVT    ECG 12 Lead Documentation Only    Date/Time: 12/3/2024 7:07 PM    Performed by: Finn Dorantes DO  Authorized by: Finn Dorantes DO    Indications / Diagnosis:  Sob  ECG reviewed by me, the ED Provider: yes    Patient location:  ED  Interpretation:     Interpretation: normal    Rate:     ECG rate:  67    ECG rate assessment: normal    Rhythm:     Rhythm: sinus rhythm    Ectopy:     Ectopy: none    QRS:     QRS axis:  Normal    QRS  intervals:  Normal  Conduction:     Conduction: abnormal      Abnormal conduction: 1st degree    ST segments:     ST segments:  Normal  T waves:     T waves: normal    Comments:      This EKG was interpreted by me.      ED Medication and Procedure Management   Prior to Admission Medications   Prescriptions Last Dose Informant Patient Reported? Taking?   DULoxetine (CYMBALTA) 20 mg capsule Not Taking Self Yes No   Sig: Take 20 mg by mouth daily   Patient not taking: Reported on 12/3/2024   MAGNESIUM PO  Self Yes No   Sig: Take 1 tablet by mouth daily   Patient not taking: Reported on 11/18/2024   Pyridoxine HCl (VITAMIN B6 PO)  Self Yes No   Sig: Take by mouth daily   Patient not taking: Reported on 11/18/2024   Saw Palmetto, Serenoa repens, (SAW PALMETTO CONCENTRATE PO)  Self Yes No   Sig: Take by mouth   Patient not taking: Reported on 5/13/2024   acetaminophen (TYLENOL) 325 mg tablet   Yes No   Sig: every 6 (six) hours as needed   atovaquone (MEPRON) 750 mg/5 mL suspension   No No   Sig: Take 10 mL (1,500 mg total) by mouth daily   calcium carbonate (TUMS) 500 mg chewable tablet   Yes No   Sig: Chew 1 tablet daily   cholecalciferol (VITAMIN D3) 1,000 units tablet   No No   Sig: Take 2 tablets (2,000 Units total) by mouth daily   cyanocobalamin (VITAMIN B-12) 100 mcg tablet Not Taking Self Yes No   Sig: Take 750 mcg by mouth daily     Patient not taking: Reported on 12/3/2024   famotidine (PEPCID) 20 mg tablet   No No   Sig: Take 1 tablet (20 mg total) by mouth in the morning   levothyroxine 100 mcg tablet  Self Yes No   Sig: TAKE 1 TABLET BY MOUTH DAILY   losartan (COZAAR) 25 mg tablet   No No   Sig: Take 1 tablet (25 mg total) by mouth daily   predniSONE 1 mg tablet   No No   Sig: Take 5 tablets (5 mg total) by mouth daily for 5 days, THEN 2.5 tablets (2.5 mg total) daily for 5 days, THEN 1 tablet (1 mg total) daily for 5 days. Do not start before November 27, 2024.   predniSONE 10 mg tablet   No No   Sig: Take 5  tablets (50 mg total) by mouth daily for 5 days, THEN 4 tablets (40 mg total) daily for 5 days, THEN 3 tablets (30 mg total) daily for 5 days, THEN 2 tablets (20 mg total) daily for 5 days, THEN 1 tablet (10 mg total) daily for 5 days.   prochlorperazine (COMPAZINE) 5 mg tablet   No No   Sig: Take 1 tablet (5 mg total) by mouth every 6 (six) hours as needed for nausea or vomiting   senna (SENOKOT) 8.6 mg   Yes No   Sig: Take 8.6 mg by mouth as needed   sevelamer carbonate (RENVELA) 800 mg tablet   Yes No   Sig: Take 1,600 mg by mouth 3 (three) times a day with meals   sodium chloride (OCEAN) 0.65 % nasal spray   Yes No   Si sprays into each nostril   Patient not taking: Reported on 2024   tadalafil (CIALIS) 5 MG tablet  Self Yes No   Sig: Take 5 mg by mouth in the morning   torsemide (DEMADEX) 100 mg tablet   No No   Sig: Take 1 tablet (100 mg total) by mouth daily   vitamin B-12 (CYANOCOBALAMIN) 500 MCG TABS   No No   Sig: Take 2 tablets (1,000 mcg total) by mouth daily      Facility-Administered Medications: None     Patient's Medications   Discharge Prescriptions    No medications on file     No discharge procedures on file.  ED SEPSIS DOCUMENTATION   Time reflects when diagnosis was documented in both MDM as applicable and the Disposition within this note       Time User Action Codes Description Comment    12/3/2024  7:00 PM Finn Dorantes [I82.409] DVT (deep venous thrombosis) ([facility])     12/3/2024  7:00 PM Finn Dorantes Modify [I82.409] DVT (deep venous thrombosis) (HCC) acute right leg    12/3/2024  7:00 PM Finn Dorantes [R06.00] Dyspnea     12/3/2024  7:01 PM Finn Dorantes [N28.9,  N18.9] Acute on chronic renal insufficiency     12/3/2024  7:01 PM Finn Dorantes [R79.89] Elevated d-dimer     12/3/2024  8:08 PM Edna Alex [C83.10] Mantle cell lymphoma, unspecified body region (HCC)                  Finn Dorantes DO  24 0008

## 2024-12-04 ENCOUNTER — APPOINTMENT (OUTPATIENT)
Dept: NON INVASIVE DIAGNOSTICS | Facility: HOSPITAL | Age: 69
End: 2024-12-04
Payer: COMMERCIAL

## 2024-12-04 ENCOUNTER — APPOINTMENT (OUTPATIENT)
Dept: NUCLEAR MEDICINE | Facility: HOSPITAL | Age: 69
End: 2024-12-04
Payer: COMMERCIAL

## 2024-12-04 ENCOUNTER — RESULTS FOLLOW-UP (OUTPATIENT)
Dept: CARDIOLOGY CLINIC | Facility: CLINIC | Age: 69
End: 2024-12-04

## 2024-12-04 PROBLEM — R80.8 OTHER PROTEINURIA: Status: ACTIVE | Noted: 2024-12-04

## 2024-12-04 PROBLEM — N18.9 CHRONIC KIDNEY DISEASE: Status: ACTIVE | Noted: 2024-12-04

## 2024-12-04 PROBLEM — N17.9 ACUTE KIDNEY INJURY (HCC): Status: ACTIVE | Noted: 2024-12-04

## 2024-12-04 LAB
ANION GAP SERPL CALCULATED.3IONS-SCNC: 5 MMOL/L (ref 4–13)
AORTIC ROOT: 4.6 CM
AORTIC VALVE MEAN VELOCITY: 8 M/S
APICAL FOUR CHAMBER EJECTION FRACTION: 55 %
APTT PPP: 122 SECONDS (ref 23–34)
APTT PPP: 68 SECONDS (ref 23–34)
AV LVOT MEAN GRADIENT: 2 MMHG
AV LVOT PEAK GRADIENT: 3 MMHG
AV MEAN GRADIENT: 3 MMHG
AV PEAK GRADIENT: 5 MMHG
AV VELOCITY RATIO: 0.83
BASOPHILS # BLD AUTO: 0.08 THOUSANDS/ÂΜL (ref 0–0.1)
BASOPHILS NFR BLD AUTO: 1 % (ref 0–1)
BSA FOR ECHO PROCEDURE: 2.13 M2
BUN SERPL-MCNC: 42 MG/DL (ref 5–25)
CALCIUM SERPL-MCNC: 8.1 MG/DL (ref 8.4–10.2)
CHLORIDE SERPL-SCNC: 108 MMOL/L (ref 96–108)
CO2 SERPL-SCNC: 28 MMOL/L (ref 21–32)
CREAT SERPL-MCNC: 2.19 MG/DL (ref 0.6–1.3)
CREAT UR-MCNC: 32.6 MG/DL
DOP CALC AO PEAK VEL: 1.12 M/S
DOP CALC AO VTI: 22.05 CM
DOP CALC LVOT PEAK VEL VTI: 18.36 CM
DOP CALC LVOT PEAK VEL: 0.93 M/S
E WAVE DECELERATION TIME: 151 MS
E/A RATIO: 0.81
EOSINOPHIL # BLD AUTO: 0.23 THOUSAND/ÂΜL (ref 0–0.61)
EOSINOPHIL NFR BLD AUTO: 3 % (ref 0–6)
ERYTHROCYTE [DISTWIDTH] IN BLOOD BY AUTOMATED COUNT: 13.6 % (ref 11.6–15.1)
FERRITIN SERPL-MCNC: 408 NG/ML (ref 24–336)
FOLATE SERPL-MCNC: 6.7 NG/ML
FRACTIONAL SHORTENING: 23 (ref 28–44)
GFR SERPL CREATININE-BSD FRML MDRD: 29 ML/MIN/1.73SQ M
GLOBAL LONGITUIDAL STRAIN: -17 %
GLUCOSE P FAST SERPL-MCNC: 88 MG/DL (ref 65–99)
GLUCOSE SERPL-MCNC: 88 MG/DL (ref 65–140)
HCT VFR BLD AUTO: 26.4 % (ref 36.5–49.3)
HGB BLD-MCNC: 8.4 G/DL (ref 12–17)
IMM GRANULOCYTES # BLD AUTO: 0.11 THOUSAND/UL (ref 0–0.2)
IMM GRANULOCYTES NFR BLD AUTO: 1 % (ref 0–2)
INTERVENTRICULAR SEPTUM IN DIASTOLE (PARASTERNAL SHORT AXIS VIEW): 1.1 CM
INTERVENTRICULAR SEPTUM: 1.1 CM (ref 0.6–1.1)
IRON SATN MFR SERPL: 39 % (ref 15–50)
IRON SERPL-MCNC: 72 UG/DL (ref 50–212)
IVC: 24 MM
LDH SERPL-CCNC: 175 U/L (ref 140–271)
LEFT ATRIUM SIZE: 3.9 CM
LEFT INTERNAL DIMENSION IN SYSTOLE: 4.4 CM (ref 2.1–4)
LEFT VENTRICLE DIASTOLIC VOLUME (MOD BIPLANE): 193 ML
LEFT VENTRICLE DIASTOLIC VOLUME INDEX (MOD BIPLANE): 90.6 ML/M2
LEFT VENTRICLE SYSTOLIC VOLUME (MOD BIPLANE): 83 ML
LEFT VENTRICLE SYSTOLIC VOLUME INDEX (MOD BIPLANE): 39 ML/M2
LEFT VENTRICULAR INTERNAL DIMENSION IN DIASTOLE: 5.7 CM (ref 3.5–6)
LEFT VENTRICULAR POSTERIOR WALL IN END DIASTOLE: 0.9 CM
LEFT VENTRICULAR STROKE VOLUME: 75 ML
LV EF: 57 %
LVSV (TEICH): 75 ML
LYMPHOCYTES # BLD AUTO: 2.37 THOUSANDS/ÂΜL (ref 0.6–4.47)
LYMPHOCYTES NFR BLD AUTO: 29 % (ref 14–44)
MAGNESIUM SERPL-MCNC: 2.2 MG/DL (ref 1.9–2.7)
MCH RBC QN AUTO: 31.7 PG (ref 26.8–34.3)
MCHC RBC AUTO-ENTMCNC: 31.8 G/DL (ref 31.4–37.4)
MCV RBC AUTO: 100 FL (ref 82–98)
MONOCYTES # BLD AUTO: 0.71 THOUSAND/ÂΜL (ref 0.17–1.22)
MONOCYTES NFR BLD AUTO: 9 % (ref 4–12)
MV E'TISSUE VEL-LAT: 10 CM/S
MV E'TISSUE VEL-SEP: 8 CM/S
MV PEAK A VEL: 0.84 M/S
MV PEAK E VEL: 68 CM/S
MV STENOSIS PRESSURE HALF TIME: 44 MS
MV VALVE AREA P 1/2 METHOD: 5
NEUTROPHILS # BLD AUTO: 4.65 THOUSANDS/ÂΜL (ref 1.85–7.62)
NEUTS SEG NFR BLD AUTO: 57 % (ref 43–75)
NRBC BLD AUTO-RTO: 0 /100 WBCS
PHOSPHATE SERPL-MCNC: 4.3 MG/DL (ref 2.3–4.1)
PLATELET # BLD AUTO: 199 THOUSANDS/UL (ref 149–390)
PMV BLD AUTO: 8.7 FL (ref 8.9–12.7)
POTASSIUM SERPL-SCNC: 4 MMOL/L (ref 3.5–5.3)
PROT UR-MCNC: 202.5 MG/DL
PROT/CREAT UR: 6.2 MG/G{CREAT} (ref 0–0.1)
RA PRESSURE ESTIMATED: 8 MMHG
RBC # BLD AUTO: 2.65 MILLION/UL (ref 3.88–5.62)
RETICS # AUTO: ABNORMAL 10*3/UL (ref 14356–105094)
RETICS # CALC: 2.42 % (ref 0.37–1.87)
SINOTUBULAR JUNCTION: 3.2 CM
SL CV LV EF: 55
SL CV PED ECHO LEFT VENTRICLE DIASTOLIC VOLUME (MOD BIPLANE) 2D: 163 ML
SL CV PED ECHO LEFT VENTRICLE SYSTOLIC VOLUME (MOD BIPLANE) 2D: 88 ML
SL CV SINUS OF VALSALVA 2D: 4.3 CM
SODIUM SERPL-SCNC: 141 MMOL/L (ref 135–147)
STJ: 3.2 CM
T4 FREE SERPL-MCNC: 1.41 NG/DL (ref 0.61–1.12)
TIBC SERPL-MCNC: 186 UG/DL (ref 250–450)
TSH SERPL DL<=0.05 MIU/L-ACNC: 4.92 UIU/ML (ref 0.45–4.5)
UIBC SERPL-MCNC: 114 UG/DL (ref 155–355)
VIT B12 SERPL-MCNC: 829 PG/ML (ref 180–914)
WBC # BLD AUTO: 8.15 THOUSAND/UL (ref 4.31–10.16)

## 2024-12-04 PROCEDURE — 84100 ASSAY OF PHOSPHORUS: CPT | Performed by: INTERNAL MEDICINE

## 2024-12-04 PROCEDURE — 99245 OFF/OP CONSLTJ NEW/EST HI 55: CPT | Performed by: NURSE PRACTITIONER

## 2024-12-04 PROCEDURE — 84156 ASSAY OF PROTEIN URINE: CPT | Performed by: NURSE PRACTITIONER

## 2024-12-04 PROCEDURE — 84439 ASSAY OF FREE THYROXINE: CPT | Performed by: NURSE PRACTITIONER

## 2024-12-04 PROCEDURE — A9540 TC99M MAA: HCPCS

## 2024-12-04 PROCEDURE — 82746 ASSAY OF FOLIC ACID SERUM: CPT | Performed by: NURSE PRACTITIONER

## 2024-12-04 PROCEDURE — 85025 COMPLETE CBC W/AUTO DIFF WBC: CPT | Performed by: INTERNAL MEDICINE

## 2024-12-04 PROCEDURE — 93971 EXTREMITY STUDY: CPT | Performed by: SURGERY

## 2024-12-04 PROCEDURE — 80048 BASIC METABOLIC PNL TOTAL CA: CPT | Performed by: INTERNAL MEDICINE

## 2024-12-04 PROCEDURE — 84443 ASSAY THYROID STIM HORMONE: CPT | Performed by: NURSE PRACTITIONER

## 2024-12-04 PROCEDURE — A9558 XE133 XENON 10MCI: HCPCS

## 2024-12-04 PROCEDURE — 93325 DOPPLER ECHO COLOR FLOW MAPG: CPT | Performed by: INTERNAL MEDICINE

## 2024-12-04 PROCEDURE — 82570 ASSAY OF URINE CREATININE: CPT | Performed by: NURSE PRACTITIONER

## 2024-12-04 PROCEDURE — 99232 SBSQ HOSP IP/OBS MODERATE 35: CPT | Performed by: INTERNAL MEDICINE

## 2024-12-04 PROCEDURE — 93308 TTE F-UP OR LMTD: CPT

## 2024-12-04 PROCEDURE — 93321 DOPPLER ECHO F-UP/LMTD STD: CPT | Performed by: INTERNAL MEDICINE

## 2024-12-04 PROCEDURE — 83540 ASSAY OF IRON: CPT | Performed by: NURSE PRACTITIONER

## 2024-12-04 PROCEDURE — 85045 AUTOMATED RETICULOCYTE COUNT: CPT | Performed by: NURSE PRACTITIONER

## 2024-12-04 PROCEDURE — 36415 COLL VENOUS BLD VENIPUNCTURE: CPT | Performed by: INTERNAL MEDICINE

## 2024-12-04 PROCEDURE — 93308 TTE F-UP OR LMTD: CPT | Performed by: INTERNAL MEDICINE

## 2024-12-04 PROCEDURE — 85730 THROMBOPLASTIN TIME PARTIAL: CPT | Performed by: INTERNAL MEDICINE

## 2024-12-04 PROCEDURE — 99245 OFF/OP CONSLTJ NEW/EST HI 55: CPT | Performed by: INTERNAL MEDICINE

## 2024-12-04 PROCEDURE — 82728 ASSAY OF FERRITIN: CPT | Performed by: NURSE PRACTITIONER

## 2024-12-04 PROCEDURE — 93325 DOPPLER ECHO COLOR FLOW MAPG: CPT

## 2024-12-04 PROCEDURE — 93321 DOPPLER ECHO F-UP/LMTD STD: CPT

## 2024-12-04 PROCEDURE — 83735 ASSAY OF MAGNESIUM: CPT | Performed by: INTERNAL MEDICINE

## 2024-12-04 PROCEDURE — 78582 LUNG VENTILAT&PERFUS IMAGING: CPT

## 2024-12-04 PROCEDURE — 99417 PROLNG OP E/M EACH 15 MIN: CPT | Performed by: NURSE PRACTITIONER

## 2024-12-04 PROCEDURE — 83615 LACTATE (LD) (LDH) ENZYME: CPT | Performed by: NURSE PRACTITIONER

## 2024-12-04 PROCEDURE — 93356 MYOCRD STRAIN IMG SPCKL TRCK: CPT | Performed by: INTERNAL MEDICINE

## 2024-12-04 PROCEDURE — 83550 IRON BINDING TEST: CPT | Performed by: NURSE PRACTITIONER

## 2024-12-04 PROCEDURE — 82607 VITAMIN B-12: CPT | Performed by: NURSE PRACTITIONER

## 2024-12-04 RX ORDER — DOCUSATE SODIUM 100 MG/1
100 CAPSULE, LIQUID FILLED ORAL EVERY 12 HOURS PRN
Status: DISCONTINUED | OUTPATIENT
Start: 2024-12-04 | End: 2024-12-04

## 2024-12-04 RX ORDER — LOSARTAN POTASSIUM 25 MG/1
25 TABLET ORAL DAILY
Status: DISCONTINUED | OUTPATIENT
Start: 2024-12-05 | End: 2024-12-05 | Stop reason: HOSPADM

## 2024-12-04 RX ORDER — FUROSEMIDE 10 MG/ML
80 INJECTION INTRAMUSCULAR; INTRAVENOUS ONCE
Status: COMPLETED | OUTPATIENT
Start: 2024-12-04 | End: 2024-12-04

## 2024-12-04 RX ORDER — DULOXETIN HYDROCHLORIDE 20 MG/1
20 CAPSULE, DELAYED RELEASE ORAL DAILY
Status: DISCONTINUED | OUTPATIENT
Start: 2024-12-04 | End: 2024-12-05 | Stop reason: HOSPADM

## 2024-12-04 RX ORDER — SULFAMETHOXAZOLE AND TRIMETHOPRIM 800; 160 MG/1; MG/1
1 TABLET ORAL DAILY
Status: ON HOLD | COMMUNITY
End: 2024-12-05 | Stop reason: CLARIF

## 2024-12-04 RX ORDER — TORSEMIDE 20 MG/1
40 TABLET ORAL DAILY
Status: DISCONTINUED | OUTPATIENT
Start: 2024-12-05 | End: 2024-12-04

## 2024-12-04 RX ORDER — DOCUSATE SODIUM 100 MG/1
100 CAPSULE, LIQUID FILLED ORAL DAILY
Status: DISCONTINUED | OUTPATIENT
Start: 2024-12-04 | End: 2024-12-05

## 2024-12-04 RX ORDER — SULFAMETHOXAZOLE AND TRIMETHOPRIM 800; 160 MG/1; MG/1
1 TABLET ORAL DAILY
Status: DISCONTINUED | OUTPATIENT
Start: 2024-12-05 | End: 2024-12-05 | Stop reason: HOSPADM

## 2024-12-04 RX ORDER — SULFAMETHOXAZOLE AND TRIMETHOPRIM 800; 160 MG/1; MG/1
1 TABLET ORAL DAILY
Status: DISCONTINUED | OUTPATIENT
Start: 2024-12-04 | End: 2024-12-04

## 2024-12-04 RX ORDER — SULFAMETHOXAZOLE AND TRIMETHOPRIM 400; 80 MG/1; MG/1
1 TABLET ORAL DAILY
COMMUNITY
Start: 2024-11-20 | End: 2025-01-01

## 2024-12-04 RX ADMIN — Medication 2000 UNITS: at 08:54

## 2024-12-04 RX ADMIN — FAMOTIDINE 20 MG: 20 TABLET, FILM COATED ORAL at 08:44

## 2024-12-04 RX ADMIN — APIXABAN 10 MG: 5 TABLET, FILM COATED ORAL at 17:47

## 2024-12-04 RX ADMIN — DULOXETINE HYDROCHLORIDE 20 MG: 20 CAPSULE, DELAYED RELEASE ORAL at 11:35

## 2024-12-04 RX ADMIN — LOSARTAN POTASSIUM 25 MG: 25 TABLET, FILM COATED ORAL at 08:44

## 2024-12-04 RX ADMIN — ATOVAQUONE 1500 MG: 750 SUSPENSION ORAL at 08:44

## 2024-12-04 RX ADMIN — TADALAFIL 5 MG: 5 TABLET ORAL at 08:58

## 2024-12-04 RX ADMIN — ACETAMINOPHEN 650 MG: 325 TABLET, FILM COATED ORAL at 21:57

## 2024-12-04 RX ADMIN — HEPARIN SODIUM 15 UNITS/KG/HR: 10000 INJECTION, SOLUTION INTRAVENOUS at 13:15

## 2024-12-04 RX ADMIN — DOCUSATE SODIUM 100 MG: 100 CAPSULE, LIQUID FILLED ORAL at 11:35

## 2024-12-04 RX ADMIN — FUROSEMIDE 80 MG: 10 INJECTION, SOLUTION INTRAVENOUS at 15:31

## 2024-12-04 RX ADMIN — ACETAMINOPHEN 650 MG: 325 TABLET, FILM COATED ORAL at 02:18

## 2024-12-04 RX ADMIN — LEVOTHYROXINE SODIUM 100 MCG: 100 TABLET ORAL at 05:08

## 2024-12-04 RX ADMIN — TORSEMIDE 100 MG: 20 TABLET ORAL at 08:43

## 2024-12-04 RX ADMIN — PREDNISONE 2.5 MG: 1 TABLET ORAL at 08:44

## 2024-12-04 RX ADMIN — ACETAMINOPHEN 650 MG: 325 TABLET, FILM COATED ORAL at 08:41

## 2024-12-04 NOTE — H&P
H&P - Hospitalist   Name: Angel Escoto 69 y.o. male I MRN: [id number]  Unit/Bed#: ED 06 I Date of Admission: 12/3/2024   Date of Service: 12/3/2024 I Hospital Day: 0     Assessment & Plan  Acute deep vein thrombosis (DVT) of right lower extremity (HCC)  Right lower legs painful at calf, erythema and increased swelling   Ddimer elevated   Venous duplex   Right lower limb   Evaluation shows acute non-occlusive thrombosis in the popliteal vein, posterior tibial veins, and peroneal veins.  No evidence of superficial thrombophlebitis noted.  Doppler evaluation shows a normal response to augmentation maneuvers.  Popliteal, posterior tibial and anterior tibial arterial Doppler waveforms are triphasic.  Left lower limb negative   Vte heparin drip ordered  Consult hematology   SOB (shortness of breath)  SOB with deep breaths x 1 month   Elevated Ddimer. Unable to have contast due to GFR  Started on VTE high heparin drip (confirmed DVT)  Order VQ scan   BNP WNL   CXR without obvious infiltrate. Denies fevers, chills, cough or congestion.   Obtian ECHO     CKD (chronic kidney disease) stage 4, GFR 15-29 ml/min (Formerly [facility])  Lab Results   Component Value Date    EGFR 27 12/03/2024    EGFR 39 11/08/2024    EGFR 11 10/23/2024    CREATININE 2.35 (H) 12/03/2024    CREATININE 1.75 (H) 11/08/2024    CREATININE 4.94 (H) 10/23/2024   Biopsy proven minimal change disease developed about 6 weeks ago. Had gained about 40 lbs of water weight. Temporary dialysis catheter placed for 2 weeks, removed about 1 month ago.   On prednisone taper. Currently on 2.5 mg.   Follows with Dr. Kel Isaac at [facility] and recently saw Dr Vazquez with [facility]   Creatine now up to 2.35. Had been down to 1.75 about 3 weeks ago. Unclear baseline at this point.   Monitor I/O  Patient has been tracking output at home. Had been out about 8430-7074 per day. On 12/2 only out about 2000 ml out. Denies emesis/diarrhea or decreased intake.   Consult nephrology    Anemia  Hg 9.0   Had been 10.5 about 3 weeks ago.   Here with DVT and worsening creatinine   Denies hematuria, hemoptysis or known melena   Patient on heparin drip due to confirmed DVT. Monitor H&H.   Transfuse < 7  Hypothyroid  Continue levothyroxine   Mantle cell lymphoma (HCC)  Recent diagnosis. Following with [facility]. Scheduled to start Rituxan on 12/10.       VTE Pharmacologic Prophylaxis: VTE Score: 3 Moderate Risk (Score 3-4) - Pharmacological DVT Prophylaxis Ordered: heparin drip.  Code Status: Level 1 - Full Code   Discussion with family: Patient declined call to .     Anticipated Length of Stay: Patient will be admitted on an observation basis with an anticipated length of stay of less than 2 midnights secondary to DVT, SOB.    History of Present Illness   Chief Complaint: Right leg swelling     Angel Escoto is a 69 y.o. male with a PMH of hypothyroidism, mantle cell lymphoma, anemia, CKD who presents with right lower leg swelling, redness with pain to touch and ambulation.  Noted by patient this morning upon waking.  Had been able to walk yesterday without issue or discomfort.  Also notes shortness of breath with deep breaths over the past month.  On room air without tachycardia or tachypnea.  Denies fevers, chills, cough or congestion.  Found to have DVT.  Started on heparin drip.  Denies prior DVT or PE history.  No recent travel.  Has been more sedentary than usual with recent hospitalization. Recently diagnosed with mantle cell lymphoma and minimal-change disease.  About 6 weeks ago had woken up with face swelling which then rapidly progressed throughout his whole body.  Gained approximately 40 pounds of water weight.  Was hospitalized at [facility].  Temporarily required dialysis for approximately 2 weeks.  This was then discontinued about 1 month ago.  Continues to be on a prednisone taper.  Has been monitoring his intake and output.  Thought he was doing well until yesterday his urine  output decreased from 4475-3769 down to 2000ml.  He is not sure why it since his intake has been consistent.  Denies emesis or diarrhea.  Has been compliant with his home medications.  He is scheduled to start on Rituxan for his mantle cell lymphoma on 12/10 also through . [place].     Review of Systems   Constitutional:  Positive for fatigue. Negative for fever.   HENT:  Negative for sore throat.    Respiratory:  Negative for cough, chest tightness and shortness of breath.    Cardiovascular:  Positive for leg swelling (R > L). Negative for chest pain.   Gastrointestinal:  Negative for abdominal distention, abdominal pain, diarrhea, nausea and vomiting.   Genitourinary:  Positive for decreased urine volume. Negative for difficulty urinating.   Musculoskeletal:  Negative for arthralgias.   Neurological:  Negative for weakness and headaches.   Psychiatric/Behavioral:  Negative for agitation and behavioral problems.    All other systems reviewed and are negative.      Historical Information   Past Medical History:   Diagnosis Date    Disease of thyroid gland      Past Surgical History:   Procedure Laterality Date    ABLATION OF DYSRHYTHMIC FOCUS      IR TUNNELED DIALYSIS CATHETER REMOVAL  11/13/2024     Social History     Tobacco Use    Smoking status: Never    Smokeless tobacco: Never   Vaping Use    Vaping status: Never Used   Substance and Sexual Activity    Alcohol use: Yes     Comment: rare    Drug use: No    Sexual activity: Not on file     E-Cigarette/Vaping    E-Cigarette Use Never User      E-Cigarette/Vaping Substances    Nicotine No     THC No     CBD No     Flavoring No     Other No     Unknown No      History reviewed. No pertinent family history.  Social History:  Marital Status: /Civil Union   Occupation: unknown   Patient Pre-hospital Living Situation: Home  Patient Pre-hospital Level of Mobility: walks  Patient Pre-hospital Diet Restrictions: regular    Meds/Allergies   I have reviewed home  medications with patient personally.  Prior to Admission medications    Medication Sig Start Date End Date Taking? Authorizing Provider   acetaminophen (TYLENOL) 325 mg tablet every 6 (six) hours as needed 10/19/24   Historical Provider, MD   Ascorbic Acid (VITAMIN C) 100 MG tablet Take 100 mg by mouth daily  Patient not taking: Reported on 5/13/2024    Historical Provider, MD   atovaquone (MEPRON) 750 mg/5 mL suspension Take 10 mL (1,500 mg total) by mouth daily 11/18/24 12/18/24  Devonte Vazquez DO   b complex vitamins capsule Take 1 capsule by mouth daily  Patient not taking: Reported on 5/13/2024    Historical Provider, MD   calcium carbonate (TUMS) 500 mg chewable tablet Chew 1 tablet daily 10/19/24   Historical Provider, MD   cholecalciferol (VITAMIN D3) 1,000 units tablet Take 2 tablets (2,000 Units total) by mouth daily 11/18/24   Devonte Vazquez DO   cyanocobalamin (VITAMIN B-12) 100 mcg tablet Take 750 mcg by mouth daily      Historical Provider, MD   DULoxetine (CYMBALTA) 20 mg capsule Take 20 mg by mouth daily    Historical Provider, MD   famotidine (PEPCID) 20 mg tablet Take 1 tablet (20 mg total) by mouth in the morning 11/18/24   Devonte Vazquez DO   levothyroxine 100 mcg tablet TAKE 1 TABLET BY MOUTH DAILY 4/24/18   Historical Provider, MD   losartan (COZAAR) 25 mg tablet Take 1 tablet (25 mg total) by mouth daily 11/18/24 11/18/25  Devonte Vazquez DO   MAGNESIUM PO Take 1 tablet by mouth daily  Patient not taking: Reported on 11/18/2024    Historical Provider, MD   predniSONE 1 mg tablet Take 5 tablets (5 mg total) by mouth daily for 5 days, THEN 2.5 tablets (2.5 mg total) daily for 5 days, THEN 1 tablet (1 mg total) daily for 5 days. Do not start before November 27, 2024. 11/27/24 12/11/24  Devonte Vazquez DO   predniSONE 10 mg tablet Take 5 tablets (50 mg total) by mouth daily for 5 days, THEN 4 tablets (40 mg total) daily for 5 days, THEN 3 tablets (30 mg total) daily for 5 days, THEN 2 tablets (20 mg  total) daily for 5 days, THEN 1 tablet (10 mg total) daily for 5 days. 11/18/24 12/12/24  Devonte Vazquez, DO   prochlorperazine (COMPAZINE) 5 mg tablet Take 1 tablet (5 mg total) by mouth every 6 (six) hours as needed for nausea or vomiting 10/23/24   Devonte Vazquez, DO   Pyridoxine HCl (VITAMIN B6 PO) Take by mouth daily  Patient not taking: Reported on 11/18/2024    Historical Provider, Heri Kemp, (SAW PALMETTO CONCENTRATE PO) Take by mouth  Patient not taking: Reported on 5/13/2024    Historical Provider, MD   senna (SENOKOT) 8.6 mg Take 8.6 mg by mouth as needed 10/19/24   Historical Provider, MD   sevelamer carbonate (RENVELA) 800 mg tablet Take 1,600 mg by mouth 3 (three) times a day with meals    Historical Provider, MD   sodium chloride (OCEAN) 0.65 % nasal spray 2 sprays into each nostril  Patient not taking: Reported on 11/18/2024 10/19/24   Historical Provider, MD   tadalafil (CIALIS) 5 MG tablet Take 5 mg by mouth daily as needed 4/2/24 9/29/24  Historical Provider, MD   torsemide (DEMADEX) 100 mg tablet Take 1 tablet (100 mg total) by mouth daily 10/23/24   Devonte Vazquez, DO   vitamin B-12 (CYANOCOBALAMIN) 500 MCG TABS Take 2 tablets (1,000 mcg total) by mouth daily 11/18/24   Devonte Vazquez, DO   vitamin E 100 UNIT capsule Take 100 Units by mouth daily  Patient not taking: Reported on 5/13/2024    Historical Provider, MD     Allergies   Allergen Reactions    Hydromorphone Hallucinations, Other (See Comments), Delirium, Confusion and Visual Disturbance     hallucinosis    Patient saw things that were not there    Other reaction(s): Delirium, Hallucinations, Mental Status Changes, Other (see comments), Visual Disturbance   hallucinosis   hallucinosis   hallucinosis   hallucinosis   Patient saw things that were not there    hallucinosis   hallucinosis   Patient saw things that were not there    hallucinosis   hallucinosis   hallucinosis   hallucinosis   Patient saw things that were not  there    hallucinosis   hallucinosis   hallucinosis   hallucinosis   hallucinosis   hallucinosis   Patient saw things that were not there    hallucinosis   hallucinosis   Patient saw things that were not there    hallucinosis    Medical Tape Rash     Itches    Skin irritate with EKG leads       Objective :  Temp:  [98 °F (36.7 °C)] 98 °F (36.7 °C)  HR:  [65-85] 76  BP: (142-169)/(70-99) 148/70  Resp:  [13-18] 13  SpO2:  [93 %-99 %] 98 %    Physical Exam  Vitals and nursing note reviewed.   Constitutional:       Appearance: Normal appearance.   HENT:      Head: Normocephalic.   Eyes:      Extraocular Movements: Extraocular movements intact.      Pupils: Pupils are equal, round, and reactive to light.   Cardiovascular:      Rate and Rhythm: Normal rate and regular rhythm.      Heart sounds: No murmur heard.     No gallop.   Pulmonary:      Effort: No respiratory distress.      Breath sounds: Normal breath sounds. No wheezing.   Abdominal:      General: Bowel sounds are normal. There is no distension.      Tenderness: There is no abdominal tenderness.   Musculoskeletal:         General: Swelling and tenderness (Right lower extremity) present. Normal range of motion.      Cervical back: Normal range of motion.      Right lower leg: Edema present.      Left lower leg: Edema present.   Skin:     General: Skin is warm.   Neurological:      General: No focal deficit present.      Mental Status: He is alert and oriented to person, place, and time. Mental status is at baseline.   Psychiatric:         Mood and Affect: Mood normal.         Behavior: Behavior normal.         Thought Content: Thought content normal.          Lines/Drains:            Lab Results: I have reviewed the following results:  Results from last 7 days   Lab Units 12/03/24  1702   WBC Thousand/uL 7.65   HEMOGLOBIN g/dL 9.0*   HEMATOCRIT % 28.2*   PLATELETS Thousands/uL 228   SEGS PCT % 67   LYMPHO PCT % 20   MONO PCT % 8   EOS PCT % 2     Results from last  7 days   Lab Units 12/03/24  1702   SODIUM mmol/L 138   POTASSIUM mmol/L 4.4   CHLORIDE mmol/L 104   CO2 mmol/L 30   BUN mg/dL 44*   CREATININE mg/dL 2.35*   ANION GAP mmol/L 4   CALCIUM mg/dL 8.5   ALBUMIN g/dL 2.8*   TOTAL BILIRUBIN mg/dL 0.48   ALK PHOS U/L 61   ALT U/L 16   AST U/L 21   GLUCOSE RANDOM mg/dL 102     Results from last 7 days   Lab Units 12/03/24  1702   INR  0.89         Lab Results   Component Value Date    HGBA1C 5.4 10/05/2024    HGBA1C 5.1 10/28/2022    HGBA1C 5.0 03/08/2021           Imaging Results Review: I reviewed radiology reports from this admission including: Ultrasound(s).  Other Study Results Review: EKG was reviewed.     Administrative Statements   I have spent a total time of 50 minutes in caring for this patient on the day of the visit/encounter including Risks and benefits of tx options, Instructions for management, Counseling / Coordination of care, Documenting in the medical record, and Reviewing / ordering tests, medicine, procedures  .    ** Please Note: This note has been constructed using a voice recognition system. **

## 2024-12-04 NOTE — ASSESSMENT & PLAN NOTE
Continue levothyroxine   TSH 4.9 slightly elevated however patient would prefer to follow-up with PCP and have a repeat TSH in a few weeks.

## 2024-12-04 NOTE — ASSESSMENT & PLAN NOTE
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  Discharged from [facility] on October 21 on prednisone 60 mg daily.  Now followed by Dr. Griffith for management in addition to Dr. Kel Isaac at [facility]  On prednisone taper currently 2.5 mg daily which started on 12/3/2024  Albumin level remains low 2.8.  Reached a alvaro of 2.5 previously  On losartan 25 mg daily and torsemide 100 mg daily  Patient presents with DVT.  Nephrotic syndrome results in increased risk of thrombotic complications such as DVT.  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  Edema improving.  Primarily in the lower extremities now acute right lower extremity edema due to DVT

## 2024-12-04 NOTE — ASSESSMENT & PLAN NOTE
Right lower legs painful at calf, erythema and increased swelling   Ddimer elevated   Venous duplex   Right lower limb   Evaluation shows acute non-occlusive thrombosis in the popliteal vein, posterior tibial veins, and peroneal veins.  No evidence of superficial thrombophlebitis noted.  Doppler evaluation shows a normal response to augmentation maneuvers.  Popliteal, posterior tibial and anterior tibial arterial Doppler waveforms are triphasic.  Left lower limb negative   Patient has risk factors as nephrotic syndrome with hypercoagulable state and mantle cell lymphoma.  Patient currently on heparin drip.  Will transition patient to Eliquis once we price check.

## 2024-12-04 NOTE — PLAN OF CARE
Problem: Potential for Falls  Goal: Patient will remain free of falls  Description: INTERVENTIONS:  - Educate patient/family on patient safety including physical limitations  - Instruct patient to call for assistance with activity   - Consult OT/PT to assist with strengthening/mobility   - Keep Call bell within reach  - Keep bed low and locked with side rails adjusted as appropriate  - Keep care items and personal belongings within reach  - Initiate and maintain comfort rounds  - Make Fall Risk Sign visible to staff  - Offer Toileting every  Hours, in advance of need  - Initiate/Maintain alarm  - Obtain necessary fall risk management equipment:   - Apply yellow socks and bracelet for high fall risk patients  - Consider moving patient to room near nurses station  Outcome: Progressing     Problem: PAIN - ADULT  Goal: Verbalizes/displays adequate comfort level or baseline comfort level  Description: Interventions:  - Encourage patient to monitor pain and request assistance  - Assess pain using appropriate pain scale  - Administer analgesics based on type and severity of pain and evaluate response  - Implement non-pharmacological measures as appropriate and evaluate response  - Consider cultural and social influences on pain and pain management  - Notify physician/advanced practitioner if interventions unsuccessful or patient reports new pain  Outcome: Progressing     Problem: INFECTION - ADULT  Goal: Absence or prevention of progression during hospitalization  Description: INTERVENTIONS:  - Assess and monitor for signs and symptoms of infection  - Monitor lab/diagnostic results  - Monitor all insertion sites, i.e. indwelling lines, tubes, and drains  - Monitor endotracheal if appropriate and nasal secretions for changes in amount and color  - Adrian appropriate cooling/warming therapies per order  - Administer medications as ordered  - Instruct and encourage patient and family to use good hand hygiene technique  -  Identify and instruct in appropriate isolation precautions for identified infection/condition  Outcome: Progressing  Goal: Absence of fever/infection during neutropenic period  Description: INTERVENTIONS:  - Monitor WBC    Outcome: Progressing     Problem: SAFETY ADULT  Goal: Patient will remain free of falls  Description: INTERVENTIONS:  - Educate patient/family on patient safety including physical limitations  - Instruct patient to call for assistance with activity   - Consult OT/PT to assist with strengthening/mobility   - Keep Call bell within reach  - Keep bed low and locked with side rails adjusted as appropriate  - Keep care items and personal belongings within reach  - Initiate and maintain comfort rounds  - Make Fall Risk Sign visible to staff  - Offer Toileting every  Hours, in advance of need  - Initiate/Maintain alarm  - Obtain necessary fall risk management equipment:   - Apply yellow socks and bracelet for high fall risk patients  - Consider moving patient to room near nurses station  Outcome: Progressing  Goal: Maintain or return to baseline ADL function  Description: INTERVENTIONS:  -  Assess patient's ability to carry out ADLs; assess patient's baseline for ADL function and identify physical deficits which impact ability to perform ADLs (bathing, care of mouth/teeth, toileting, grooming, dressing, etc.)  - Assess/evaluate cause of self-care deficits   - Assess range of motion  - Assess patient's mobility; develop plan if impaired  - Assess patient's need for assistive devices and provide as appropriate  - Encourage maximum independence but intervene and supervise when necessary  - Involve family in performance of ADLs  - Assess for home care needs following discharge   - Consider OT consult to assist with ADL evaluation and planning for discharge  - Provide patient education as appropriate  Outcome: Progressing  Goal: Maintains/Returns to pre admission functional level  Description:  INTERVENTIONS:  - Perform AM-PAC 6 Click Basic Mobility/ Daily Activity assessment daily.  - Set and communicate daily mobility goal to care team and patient/family/caregiver.   - Collaborate with rehabilitation services on mobility goals if consulted  - Perform Range of Motion  times a day.  - Reposition patient every  hours.  - Dangle patient  times a day  - Stand patient  times a day  - Ambulate patient  times a day  - Out of bed to chair  times a day   - Out of bed for meals times a day  - Out of bed for toileting  - Record patient progress and toleration of activity level   Outcome: Progressing     Problem: DISCHARGE PLANNING  Goal: Discharge to home or other facility with appropriate resources  Description: INTERVENTIONS:  - Identify barriers to discharge w/patient and caregiver  - Arrange for needed discharge resources and transportation as appropriate  - Identify discharge learning needs (meds, wound care, etc.)  - Arrange for interpretive services to assist at discharge as needed  - Refer to Case Management Department for coordinating discharge planning if the patient needs post-hospital services based on physician/advanced practitioner order or complex needs related to functional status, cognitive ability, or social support system  Outcome: Progressing     Problem: Knowledge Deficit  Goal: Patient/family/caregiver demonstrates understanding of disease process, treatment plan, medications, and discharge instructions  Description: Complete learning assessment and assess knowledge base.  Interventions:  - Provide teaching at level of understanding  - Provide teaching via preferred learning methods  Outcome: Progressing     Problem: Nutrition/Hydration-ADULT  Goal: Nutrient/Hydration intake appropriate for improving, restoring or maintaining nutritional needs  Description: Monitor and assess patient's nutrition/hydration status for malnutrition. Collaborate with interdisciplinary team and initiate plan and  interventions as ordered.  Monitor patient's weight and dietary intake as ordered or per policy. Utilize nutrition screening tool and intervene as necessary. Determine patient's food preferences and provide high-protein, high-caloric foods as appropriate.     INTERVENTIONS:  - Monitor oral intake, urinary output, labs, and treatment plans  - Assess nutrition and hydration status and recommend course of action  - Evaluate amount of meals eaten  - Assist patient with eating if necessary   - Allow adequate time for meals  - Recommend/ encourage appropriate diets, oral nutritional supplements, and vitamin/mineral supplements  - Order, calculate, and assess calorie counts as needed  - Recommend, monitor, and adjust tube feedings and TPN/PPN based on assessed needs  - Assess need for intravenous fluids  - Provide specific nutrition/hydration education as appropriate  - Include patient/family/caregiver in decisions related to nutrition  Outcome: Progressing

## 2024-12-04 NOTE — ASSESSMENT & PLAN NOTE
Right lower legs painful at calf, erythema and increased swelling   Ddimer elevated   Venous duplex   Right lower limb   Evaluation shows acute non-occlusive thrombosis in the popliteal vein, posterior tibial veins, and peroneal veins.  No evidence of superficial thrombophlebitis noted.  Doppler evaluation shows a normal response to augmentation maneuvers.  Popliteal, posterior tibial and anterior tibial arterial Doppler waveforms are triphasic.  Left lower limb negative   Vte heparin drip ordered  Consult hematology

## 2024-12-04 NOTE — ASSESSMENT & PLAN NOTE
Baseline creatinine near 0.95 as of June 2024  Patient presented in October with SALLIE, creatinine of 3.31 with nephrotic syndrome and found to have minimal-change disease.  He required short-term hemodialysis (approximately 2 weeks) with subsequent renal recovery.  After dialysis discontinued follow-up creatinine 1.75 on 11/8/2024  On torsemide 100 mg daily and losartan 25 mg daily  Blood pressure adequately controlled.  Volume status improving  He presents on 12/3 creatinine 2.35 with repeat level 2.19

## 2024-12-04 NOTE — ASSESSMENT & PLAN NOTE
Lab Results   Component Value Date    EGFR 27 12/03/2024    EGFR 39 11/08/2024    EGFR 11 10/23/2024    CREATININE 2.35 (H) 12/03/2024    CREATININE 1.75 (H) 11/08/2024    CREATININE 4.94 (H) 10/23/2024   Biopsy proven minimal change disease developed about 6 weeks ago. Had gained about 40 lbs of water weight. Temporary dialysis catheter placed for 2 weeks, removed about 1 month ago.   On prednisone taper. Currently on 2.5 mg.   Follows with Dr. Kel Isaac at [facility] and recently saw Dr Vazquez with [facility]   Creatine now up to 2.35. Had been down to 1.75 about 3 weeks ago. Unclear baseline at this point.   Monitor I/O  Patient has been tracking output at home. Had been out about 4226-9727 per day. On 12/2 only out about 2000 ml out. Denies emesis/diarrhea or decreased intake.   Consult nephrology

## 2024-12-04 NOTE — ASSESSMENT & PLAN NOTE
Lab Results   Component Value Date    EGFR 29 12/04/2024    EGFR 27 12/03/2024    EGFR 39 11/08/2024    CREATININE 2.19 (H) 12/04/2024    CREATININE 2.35 (H) 12/03/2024    CREATININE 1.75 (H) 11/08/2024   Biopsy proven minimal change disease developed about 6 weeks ago. Had gained about 40 lbs of water weight. Temporary dialysis catheter placed for 2 weeks, removed about 1 month ago.   On prednisone taper. Currently on 2.5 mg.   Follows with Dr. Kel Isaac at [facility] and recently saw Dr Vazquez with [facility]   Unclear baseline at this point.    Nephrology was consulted.  Per nephrology torsemide decreased to 40 mg daily.

## 2024-12-04 NOTE — ED NOTES
Per provider ok to eat and drink, pt given turkey sandwich and ginger ale     Gunjan Whitlock RN  12/03/24 1919

## 2024-12-04 NOTE — CASE MANAGEMENT
Case Management Assessment & Discharge Planning Note    Patient name Angel Escoto  Location  SDU/-01 S* MRN [id number]  : 1955 Date 2024       Current Admission Date: 12/3/2024  Current Admission Diagnosis:Acute deep vein thrombosis (DVT) of right lower extremity (HCC)   Patient Active Problem List    Diagnosis Date Noted Date Diagnosed    Acute kidney injury (HCC) 2024     Other proteinuria 2024     Chronic kidney disease 2024     Acute deep vein thrombosis (DVT) of right lower extremity (HCC) 2024     Anemia 2024     CKD (chronic kidney disease) stage 4, GFR 15-29 ml/min (HCC) 2024     SOB (shortness of breath) 2024     Diabetes mellitus due to underlying condition with diabetic nephropathy, without long-term current use of insulin (HCC) 2024     Nephrotic syndrome 2024     Acute interstitial nephritis 2024     Minimal change disease 2024     Mantle cell lymphoma (HCC) 2024     Hypothyroid 2019     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       LOS (days): 0  Geometric Mean LOS (GMLOS) (days):   Days to GMLOS:     OBJECTIVE:              Current admission status: Observation       Preferred Pharmacy:   [id number] [place] - [address]  [address] [place] [address]  Phone: [phone] Fax: [phone]    Primary Care Provider: Debra Crowe PA-C    Primary Insurance: BLUE CROSS  Secondary Insurance:     ASSESSMENT:  Active Health Care Proxies       Cailin Escoto Health Care Agent - Spouse   Primary Phone: [phone] (Mobile)                 Advance Directives  Does patient have a Health Care POA?: Yes  Does patient have Advance Directives?: Yes  Advance Directives: Living will, Power of  for health care  Primary Contact: Cailin Jevon: wife: [phone]    Readmission Root Cause  30 Day Readmission: No    Patient Information  Admitted from:: Home  Mental  Status: Alert  During Assessment patient was accompanied by: Not accompanied during assessment  Assessment information provided by:: Patient  Primary Caregiver: Self  Support Systems: Self, Spouse/significant other, Children, Family members  County of Residence: [place]  What city do you live in?: [place]  Home entry access options. Select all that apply.: Stairs  Number of steps to enter home.: 2  Do the steps have railings?: No  Type of Current Residence: 2 story home  Upon entering residence, is there a bedroom on the main floor (no further steps)?: No  A bedroom is located on the following floor levels of residence (select all that apply):: 2nd Floor  Upon entering residence, is there a bathroom on the main floor (no further steps)?: No  Indicate which floors of current residence have a bathroom (select all the apply):: 2nd Floor  Number of steps to 2nd floor from main floor: One Flight  Living Arrangements: Lives w/ Spouse/significant other, Lives w/ Children  Is patient a ?: No    Activities of Daily Living Prior to Admission  Functional Status: Independent  Completes ADLs independently?: Yes  Ambulates independently?: Yes  Does patient use assisted devices?: No  Does patient currently own DME?: No  Does patient have a history of Outpatient Therapy (PT/OT)?: No  Does the patient have a history of Short-Term Rehab?: No  Does patient have a history of HHC?: No  Does patient currently have HHC?: No    Patient Information Continued  Income Source: Self-employed  Does patient have prescription coverage?: Yes  Does patient receive dialysis treatments?: No  Does patient have a history of substance abuse?: No  Does patient have a history of Mental Health Diagnosis?: No    Means of Transportation  Means of Transport to Appts:: Drives Self    DISCHARGE DETAILS:    Additional Comments: Call placed to Pt's [facility] pharmacy in [place]. Per pharmacist, Pt does not have copay cost for Eliquis. Pt lives with wife and 2  dtrs in 2sh, 2 obi, no railings. Independent PTA. Drives, grocery shopping. Denies hx of VNA/SNF/MH/D&A. Informed Pt of no copay for Eliquis. Pt expressed understanding. Pt plans to return home and does not anticipate discharge needs. CM to follow.

## 2024-12-04 NOTE — PROGRESS NOTES
Progress Note - Hospitalist   Name: Angel Escoto 69 y.o. male I MRN: [id number]  Unit/Bed#: -01 SDU I Date of Admission: 12/3/2024   Date of Service: 12/4/2024 I Hospital Day: 0    Assessment & Plan  Acute deep vein thrombosis (DVT) of right lower extremity (HCC)  Right lower legs painful at calf, erythema and increased swelling   Ddimer elevated   Venous duplex   Right lower limb   Evaluation shows acute non-occlusive thrombosis in the popliteal vein, posterior tibial veins, and peroneal veins.  No evidence of superficial thrombophlebitis noted.  Doppler evaluation shows a normal response to augmentation maneuvers.  Popliteal, posterior tibial and anterior tibial arterial Doppler waveforms are triphasic.  Left lower limb negative   Patient has risk factors as nephrotic syndrome with hypercoagulable state and mantle cell lymphoma.  Patient currently on heparin drip.  Will transition patient to Eliquis once we price check.    SOB (shortness of breath)  SOB with deep breaths x 1 month   Elevated Ddimer. Unable to have contast due to GFR  BNP WNL   CXR without obvious infiltrate. Denies fevers, chills, cough or congestion.   Echo with LVEF 55% diastolic dysfunction 1 no change compared to last echo.  Patient comfortably at room air in no distress.  VQ scan pending    CKD (chronic kidney disease) stage 4, GFR 15-29 ml/min ([facility])  Lab Results   Component Value Date    EGFR 29 12/04/2024    EGFR 27 12/03/2024    EGFR 39 11/08/2024    CREATININE 2.19 (H) 12/04/2024    CREATININE 2.35 (H) 12/03/2024    CREATININE 1.75 (H) 11/08/2024   Biopsy proven minimal change disease developed about 6 weeks ago. Had gained about 40 lbs of water weight. Temporary dialysis catheter placed for 2 weeks, removed about 1 month ago.   On prednisone taper. Currently on 2.5 mg.   Follows with Dr. Kel Isaac at [facility] and recently saw Dr Vazquez with [facility]   Unclear baseline at this point.    Nephrology was consulted.  Per  nephrology torsemide decreased to 40 mg daily.  Anemia  Hg 9.0   Had been 10.5 about 3 weeks ago.   Here with DVT and worsening creatinine   Denies hematuria, hemoptysis or known melena   Patient on heparin drip due to confirmed DVT. Monitor H&H.   Transfuse < 7  Hematology consulted, appreciate recommendations.  Iron panel, folate and vitamin B12 in process.  Hypothyroid  Continue levothyroxine   TSH 4.9 slightly elevated however patient would prefer to follow-up with PCP and have a repeat TSH in a few weeks.  Mantle cell lymphoma (HCC)  Recent diagnosis. Following with [facility]. Scheduled to start Rituxan on 12/10.   Nephrotic syndrome    Minimal change disease    Acute kidney injury (HCC)      VTE Pharmacologic Prophylaxis: VTE Score: 3 Moderate Risk (Score 3-4) - Pharmacological DVT Prophylaxis Ordered: heparin drip.    Mobility:   Basic Mobility Inpatient Raw Score: 24  JH-HLM Goal: 8: Walk 250 feet or more  JH-HLM Achieved: 8: Walk 250 feet ot more  JH-HLM Goal achieved. Continue to encourage appropriate mobility.    Patient Centered Rounds: I performed bedside rounds with nursing staff today.   Discussions with Specialists or Other Care Team Provider: cm    Education and Discussions with Family / Patient: Updated  (wife) at bedside.    Current Length of Stay: 0 day(s)  Current Patient Status: Observation   Certification Statement:  Continue observation, likely discharge tomorrow  Discharge Plan: Anticipate discharge in 24-48 hrs to home.    Code Status: Level 1 - Full Code    Subjective     Right leg pain.  She currently no shortness of breath, no chest pain.  No nausea or vomiting.    Objective :  Temp:  [98 °F (36.7 °C)-98.7 °F (37.1 °C)] 98.3 °F (36.8 °C)  HR:  [65-85] 79  BP: (117-169)/(62-99) 117/62  Resp:  [12-22] 20  SpO2:  [93 %-100 %] 97 %  O2 Device: None (Room air)    Body mass index is 26.99 kg/m².     Input and Output Summary (last 24 hours):     Intake/Output Summary (Last 24 hours)  at 12/4/2024 1246  Last data filed at 12/4/2024 1101  Gross per 24 hour   Intake 537.76 ml   Output 1400 ml   Net -862.24 ml       Physical Exam  Vitals and nursing note reviewed.   Constitutional:       General: He is not in acute distress.     Appearance: He is not diaphoretic.   HENT:      Head: Normocephalic.   Eyes:      General: No scleral icterus.        Right eye: No discharge.         Left eye: No discharge.   Cardiovascular:      Rate and Rhythm: Normal rate and regular rhythm.   Pulmonary:      Effort: Pulmonary effort is normal. No respiratory distress.      Breath sounds: Normal breath sounds. No wheezing, rhonchi or rales.   Abdominal:      General: There is no distension.      Palpations: Abdomen is soft.      Tenderness: There is no abdominal tenderness. There is no guarding or rebound.   Musculoskeletal:      Cervical back: Normal range of motion.      Right lower leg: No edema.      Left lower leg: Edema present.   Skin:     General: Skin is warm.   Neurological:      Mental Status: He is alert and oriented to person, place, and time.   Psychiatric:         Mood and Affect: Mood normal.         Behavior: Behavior normal.         Thought Content: Thought content normal.         Judgment: Judgment normal.           Lines/Drains:              Lab Results: I have reviewed the following results:   Results from last 7 days   Lab Units 12/04/24  0424   WBC Thousand/uL 8.15   HEMOGLOBIN g/dL 8.4*   HEMATOCRIT % 26.4*   PLATELETS Thousands/uL 199   SEGS PCT % 57   LYMPHO PCT % 29   MONO PCT % 9   EOS PCT % 3     Results from last 7 days   Lab Units 12/04/24  0424 12/03/24  1702   SODIUM mmol/L 141 138   POTASSIUM mmol/L 4.0 4.4   CHLORIDE mmol/L 108 104   CO2 mmol/L 28 30   BUN mg/dL 42* 44*   CREATININE mg/dL 2.19* 2.35*   ANION GAP mmol/L 5 4   CALCIUM mg/dL 8.1* 8.5   ALBUMIN g/dL  --  2.8*   TOTAL BILIRUBIN mg/dL  --  0.48   ALK PHOS U/L  --  61   ALT U/L  --  16   AST U/L  --  21   GLUCOSE RANDOM mg/dL  88 102     Results from last 7 days   Lab Units 12/03/24  1702   INR  0.89                   Recent Cultures (last 7 days):         Imaging Results Review: I reviewed radiology reports from this admission including: Ultrasound(s).  Other Study Results Review: No additional pertinent studies reviewed.    Last 24 Hours Medication List:     Current Facility-Administered Medications:     acetaminophen (TYLENOL) tablet 650 mg, Q6H PRN    calcium carbonate (TUMS) chewable tablet 500 mg, Daily PRN    Cholecalciferol (VITAMIN D3) tablet 2,000 Units, Daily    docusate sodium (COLACE) capsule 100 mg, Daily    DULoxetine (CYMBALTA) delayed release capsule 20 mg, Daily    famotidine (PEPCID) tablet 20 mg, Daily    heparin (porcine) 25,000 units in 0.45% NaCl 250 mL infusion (premix), Titrated, Last Rate: 15 Units/kg/hr (12/04/24 0938)    heparin (porcine) injection 3,600 Units, Q6H PRN    heparin (porcine) injection 7,200 Units, Q6H PRN    levothyroxine tablet 100 mcg, Early Morning    [START ON 12/5/2024] losartan (COZAAR) tablet 25 mg, Daily    predniSONE tablet 2.5 mg, Daily **FOLLOWED BY** [START ON 12/7/2024] predniSONE tablet 1 mg, Daily    [START ON 12/5/2024] sulfamethoxazole-trimethoprim (BACTRIM DS) 800-160 mg per tablet 1 tablet, Daily    tadalafil (CIALIS) tablet 5 mg, Daily    [START ON 12/5/2024] torsemide (DEMADEX) tablet 40 mg, Daily    Administrative Statements   Today, Patient Was Seen By: Magdalene Silver MD      **Please Note: This note may have been constructed using a voice recognition system.**

## 2024-12-04 NOTE — PLAN OF CARE
Problem: Potential for Falls  Goal: Patient will remain free of falls  Description: INTERVENTIONS:  - Educate patient/family on patient safety including physical limitations  - Instruct patient to call for assistance with activity   - Consult OT/PT to assist with strengthening/mobility   - Keep Call bell within reach  - Keep bed low and locked with side rails adjusted as appropriate  - Keep care items and personal belongings within reach  - Initiate and maintain comfort rounds  - Make Fall Risk Sign visible to staff  - Offer Toileting every 2 Hours, in advance of need  - Initiate/Maintain bed alarm  - Obtain necessary fall risk management equipment:   Problem: INFECTION - ADULT  Goal: Absence or prevention of progression during hospitalization  Description: INTERVENTIONS:  - Assess and monitor for signs and symptoms of infection  - Monitor lab/diagnostic results  - Monitor all insertion sites, i.e. indwelling lines, tubes, and drains  - Monitor endotracheal if appropriate and nasal secretions for changes in amount and color  - [place] appropriate cooling/warming therapies per order  - Administer medications as ordered  - Instruct and encourage patient and family to use good hand hygiene technique  - Identify and instruct in appropriate isolation precautions for identified infection/condition  Outcome: Progressing  Goal: Absence of fever/infection during neutropenic period  Description: INTERVENTIONS:  - Monitor WBC    Outcome: Progressing     Problem: SAFETY ADULT  Goal: Patient will remain free of falls  Description: INTERVENTIONS:  - Educate patient/family on patient safety including physical limitations  - Instruct patient to call for assistance with activity   - Consult OT/PT to assist with strengthening/mobility   - Keep Call bell within reach  - Keep bed low and locked with side rails adjusted as appropriate  - Keep care items and personal belongings within reach  - Initiate and maintain comfort rounds  -  Make Fall Risk Sign visible to staff    - Apply yellow socks and bracelet for high fall risk patients  - Consider moving patient to room near nurses station  Outcome: Progressing  Goal: Maintain or return to baseline ADL function  Description: INTERVENTIONS:  -  Assess patient's ability to carry out ADLs; assess patient's baseline for ADL function and identify physical deficits which impact ability to perform ADLs (bathing, care of mouth/teeth, toileting, grooming, dressing, etc.)  - Assess/evaluate cause of self-care deficits   - Assess range of motion  - Assess patient's mobility; develop plan if impaired  - Assess patient's need for assistive devices and provide as appropriate  - Encourage maximum independence but intervene and supervise when necessary  - Involve family in performance of ADLs  - Assess for home care needs following discharge   - Consider OT consult to assist with ADL evaluation and planning for discharge  - Provide patient education as appropriate  Outcome: Progressing  Goal: Maintains/Returns to pre admission functional level  Description: INTERVENTIONS:  - Perform AM-PAC 6 Click Basic Mobility/ Daily Activity assessment daily.  - Set and communicate daily mobility goal to care team and patient/family/caregiver.   - Collaborate with rehabilitation services on mobility goals if consulted    Problem: SAFETY ADULT  Goal: Patient will remain free of falls  Description: INTERVENTIONS:  - Educate patient/family on patient safety including physical limitations  - Instruct patient to call for assistance with activity   - Consult OT/PT to assist with strengthening/mobility   - Keep Call bell within reach  - Keep bed low and locked with side rails adjusted as appropriate  - Keep care items and personal belongings within reach  - Initiate and maintain comfort rounds  - Make Fall Risk Sign visible to staff    - Apply yellow socks and bracelet for high fall risk patients  - Consider moving patient to room  near nurses station  Outcome: Progressing  Goal: Maintain or return to baseline ADL function  Description: INTERVENTIONS:  -  Assess patient's ability to carry out ADLs; assess patient's baseline for ADL function and identify physical deficits which impact ability to perform ADLs (bathing, care of mouth/teeth, toileting, grooming, dressing, etc.)  - Assess/evaluate cause of self-care deficits   - Assess range of motion  - Assess patient's mobility; develop plan if impaired  - Assess patient's need for assistive devices and provide as appropriate  - Encourage maximum independence but intervene and supervise when necessary  - Involve family in performance of ADLs  - Assess for home care needs following discharge   - Consider OT consult to assist with ADL evaluation and planning for discharge  - Provide patient education as appropriate  Outcome: Progressing  Goal: Maintains/Returns to pre admission functional level  Description: INTERVENTIONS:  - Perform AM-PAC 6 Click Basic Mobility/ Daily Activity assessment daily.  - Set and communicate daily mobility goal to care team and patient/family/caregiver.   - Collaborate with rehabilitation services on mobility goals if consulted    - Out of bed for toileting  - Record patient progress and toleration of activity level   Outcome: Progressing     - Out of bed for toileting  - Record patient progress and toleration of activity level   Outcome: Progressing     - Apply yellow socks and bracelet for high fall risk patients  - Consider moving patient to room near nurses station  Outcome: Progressing

## 2024-12-04 NOTE — ASSESSMENT & PLAN NOTE
SOB with deep breaths x 1 month   Elevated Ddimer. Unable to have contast due to GFR  Started on VTE high heparin drip (confirmed DVT)  Order VQ scan   BNP WNL   CXR without obvious infiltrate. Denies fevers, chills, cough or congestion.   Obtian ECHO

## 2024-12-04 NOTE — ASSESSMENT & PLAN NOTE
SOB with deep breaths x 1 month   Elevated Ddimer. Unable to have contast due to GFR  BNP WNL   CXR without obvious infiltrate. Denies fevers, chills, cough or congestion.   Echo with LVEF 55% diastolic dysfunction 1 no change compared to last echo.  Patient comfortably at room air in no distress.  VQ scan pending

## 2024-12-04 NOTE — CONSULTS
Medical Oncology/Hematology Consult Note  Angel Escoto, 69 y.o., 1955   SDU/-01 S*, [id number]  12/04/24    Assessment and Plan:    1. Acute DVT right lower extremity   Patient presented with right calf pain, swelling and erythema  D-dimer 14.14  VAS duplex study is positive for an acute nonocclusive thrombus in the popliteal vein, posterior tibial veins and peroneal veins of right lower limb.  No evidence of thrombus in left lower limb.    VQ scan and ECHO pending     Provoking risk factors include mantle cell lymphoma, nephrotic syndrome    Currently on heparin drip.  Recommend transitioning to DOAC, i.e. Eliquis on discharge    2. Mantle Cell Lymphoma   Follows with Dr Alves at [facility] for biopsy-proven mantle cell lymphoma  Patient has had indolent disease over the last 7 years.  Ki 67 proliferative index <10%    10/30/24 PET CT consistent with low tumor burden Mantle cell lymphoma:  1. Overall Response: PER 5: Progressive metabolic disease. Deauville score: 4  2. Compared to prior FDG PET/CT there has been a decrease in size and avidity of multiple lymph nodes seen on prior FDG PET CT has been interval enlargement of multiple pelvic and retroperitoneal lymph nodes which are mildly avid.     He is planned to start treatment with Rituxan, first dose scheduled for 12/10/2024    3. MGUS  IgG kappa, faint spike, not quantifiable and normal light chain ratio at diagnosis, no plasmacytosis on BMBx c/w low risk disease (</=5% progression to plasma cell dyscrasia or LPD), possibly related to MCL.     4.  Nephrotic Syndrome   5. SALLIE  Recent hospitalization at [facility] in October and required hemodialysis for SALLIE, acute interstitial nephritis.  Underwent renal biopsy-proven minimal-change disease.  Pathology with podocytopathy with global foot process effacement, focal global glomerulosclerosis, acute interstitial nephritis, mild interstitial fibrosis. Most c/w minimal change vs FSGS    Negative for any evidence of involvement of patient's mantle cell lymphoma.    Currently on prednisone 20 mg daily.  Planned for outpatient Rituxan therapy as concerned that minimal-change disease has been exacerbated by his mantle cell lymphoma    Nephrology following      6. Anemia   Multifactorial, CKD/SALLIE contributing  Hemoglobin 10.5 approximately month ago  Hemoglobin 9 on admission > 8.4   Anemia workup ordered        Patient will follow-up with his care team at [facility]    Please do not hesitate to contact me if you have any questions or need additional information. Thank you for this consult.    Aline Cain MSN, CRNP, ACHPN, OCN  Hematology Oncology Nurse Practitioner      Reason for Consultation: Acute DVT      History of present illness:   Angel Escoto is a 69 y.o. male with a history of hypothyroidism, prostate cancer status post prostatectomy 12/2022, mantle cell lymphoma that has remained indolent for 7 years, IgG kappa MGUS, peripheral neuropathy, nonischemic cardiomyopathy with first-degree AV block and frequent PVCs status post ablation, hemorrhoidal bleeding, recent diagnosis of nephrotic syndrome and acute kidney injury requiring dialysis briefly.  Renal biopsy showed minimal change disease with acute interstitial nephritis.  Currently on daily prednisone with plans to initiate treatment with Rituxan.  He follows with oncology and nephrology at [facility].  No evidence of mantle cell in the kidneys was noted.  Patient recently establish care with Dr. Vazquez with [facility].  He presented through the ED on 12/3/2024 with right lower extremity calf pain, redness and increased swelling.  He was found to have an acute nonocclusive thrombosis in the popliteal vein, posterior tibial veins and peroneal veins of R LE.  He has been initiated on heparin drip.        Oncologic History:  Patient follows with German Alves MD [facility]  "[place]  Per Care everywhere, most recent office visit 11/11/2024:  \"Mr. Angel Escoto is a 69 y.o. male who presents to clinic with his wife for evaluation of his mantle cell lymphoma.    Since his last visit, he developed acute kidney injury of unclear etiology. He was admitted initially to a local hospital in [place], where he had visited his sister. He presented with swelling of his feet, hands and abdomen as well as with periorbital edema and diarrhea. He was transferred to [facility] on 10/04/2024. He underwent a renal biopsy that showed acute interstitial nephritis resident of minimal change disease versus FSGS. He was started on steroid and initially underwent hemodialysis. Has now been able to come off hemodialysis. He is on a prednisone taper and currently takes 40 mg daily. There was no evidence of active mantle cell lymphoma in his biopsy. He had a recent PET-CT on 10/30/2024 that showed no significant change in compared to his CT imaging from August 2024, consistent with ongoing low tumor burden mantle cell lymphoma.    8/13/24 - lymph node biopsy that confirmed Mantle cell lymphoma  Given that he had low tumor burden disease observation was recommended.     Mild stable anemia, at least in part related to it CKD. Elevated creatinine consistent with his SALLIE, low albumin consistent with protein loss due to his renal disease. Elevated LDH is nonspecific.     Pathology:  NEW:  10/8/24:Kidney, biopsy:  - Podocytopathy with global foot process effacement, see note  - Focal global glomerulosclerosis (7 of 18)  - Acute interstitial nephritis  - Mild interstitial fibrosis    There is no overt evidence of involvement of this specimen by the patient's known mantle cell lymphoma.     Relevant imaging:   10/30/24 PET CT  1. Overall Response: PER 5: Progressive metabolic disease. Deauville score: 4  2. Compared to prior FDG PET/CT there has been a decrease in size and avidity of " "multiple lymph nodes seen on prior FDG PET CT has been interval enlargement of multiple pelvic and retroperitoneal lymph nodes which are mildly avid.\"        Interval history: Calf pain and swelling have improved since being initiated on heparin drip.  Denies chest pain, shortness of breath.  No B symptoms.  Has baseline neuropathy in lower extremities    Review of Systems   Cardiovascular:  Positive for leg swelling.   All other systems reviewed and are negative.    All other review of systems were negative.    Past medical history:   Past Medical History:   Diagnosis Date    Acute nephrotic syndrome     Disease of thyroid gland     Mantle cell lymphoma (HCC)        Past surgical history:   Past Surgical History:   Procedure Laterality Date    ABLATION OF DYSRHYTHMIC FOCUS      IR TUNNELED DIALYSIS CATHETER REMOVAL  11/13/2024       Allergies:   Allergies   Allergen Reactions    Hydromorphone Hallucinations, Other (See Comments), Delirium, Confusion and Visual Disturbance     hallucinosis    Patient saw things that were not there    Other reaction(s): Delirium, Hallucinations, Mental Status Changes, Other (see comments), Visual Disturbance   hallucinosis   hallucinosis   hallucinosis   hallucinosis   Patient saw things that were not there    hallucinosis   hallucinosis   Patient saw things that were not there    hallucinosis   hallucinosis   hallucinosis   hallucinosis   Patient saw things that were not there    hallucinosis   hallucinosis   hallucinosis   hallucinosis   hallucinosis   hallucinosis   Patient saw things that were not there    hallucinosis   hallucinosis   Patient saw things that were not there    hallucinosis    Medical Tape Rash     Itches    Skin irritate with EKG leads       Home medications:   Medications Prior to Admission:     acetaminophen (TYLENOL) 325 mg tablet    calcium carbonate (TUMS) 500 mg chewable tablet    cholecalciferol (VITAMIN D3) 1,000 units tablet    levothyroxine 100 mcg " tablet    losartan (COZAAR) 25 mg tablet    torsemide (DEMADEX) 100 mg tablet    atovaquone (MEPRON) 750 mg/5 mL suspension    cyanocobalamin (VITAMIN B-12) 100 mcg tablet    DULoxetine (CYMBALTA) 20 mg capsule    famotidine (PEPCID) 20 mg tablet    MAGNESIUM PO    predniSONE 1 mg tablet    predniSONE 10 mg tablet    prochlorperazine (COMPAZINE) 5 mg tablet    Pyridoxine HCl (VITAMIN B6 PO)    Saw Palmetto, Serenoa repens, (SAW PALMETTO CONCENTRATE PO)    senna (SENOKOT) 8.6 mg    sevelamer carbonate (RENVELA) 800 mg tablet    sodium chloride (OCEAN) 0.65 % nasal spray    tadalafil (CIALIS) 5 MG tablet    vitamin B-12 (CYANOCOBALAMIN) 500 MCG TABS    Hospital medications:   Current Facility-Administered Medications:     acetaminophen (TYLENOL) tablet 650 mg, 650 mg, Oral, Q6H PRN, Edna Alex PA-C, 650 mg at 12/04/24 0218    atovaquone (MEPRON) oral suspension 1,500 mg, 1,500 mg, Oral, Daily, Edna Alex PA-C    calcium carbonate (TUMS) chewable tablet 500 mg, 500 mg, Oral, Daily PRN, Edna Alex PA-C    Cholecalciferol (VITAMIN D3) tablet 2,000 Units, 2,000 Units, Oral, Daily, Edna Alex PA-C    famotidine (PEPCID) tablet 20 mg, 20 mg, Oral, Daily, Edna Alex PA-C    heparin (porcine) 25,000 units in 0.45% NaCl 250 mL infusion (premix), 3-30 Units/kg/hr (Order-Specific), Intravenous, Titrated, Edna Alex PA-C, Last Rate: 13.5 mL/hr at 12/04/24 0316, 15 Units/kg/hr at 12/04/24 0316    heparin (porcine) injection 3,600 Units, 3,600 Units, Intravenous, Q6H PRN, Edna Alex PA-C    heparin (porcine) injection 7,200 Units, 7,200 Units, Intravenous, Q6H PRN, Edna Alex PA-C    levothyroxine tablet 100 mcg, 100 mcg, Oral, Early Morning, Edna Alex PA-C, 100 mcg at 12/04/24 0508    losartan (COZAAR) tablet 25 mg, 25 mg, Oral, Daily, Edna Alex PA-C    predniSONE tablet 2.5 mg, 2.5 mg, Oral, Daily **FOLLOWED BY** [START ON 12/7/2024] predniSONE tablet 1 mg, 1 mg, Oral, Daily, Edna Alex PA-C    senna (SENOKOT) tablet  8.6 mg, 1 tablet, Oral, Daily PRN, Edna Alex PA-C    tadalafil (CIALIS) tablet 5 mg, 5 mg, Oral, Daily, Edna Alex PA-C    torsemide (DEMADEX) tablet 100 mg, 100 mg, Oral, Daily, Edna Alex PA-C    Social history:   Social History     Tobacco Use    Smoking status: Never    Smokeless tobacco: Never   Vaping Use    Vaping status: Never Used   Substance Use Topics    Alcohol use: Yes     Comment: rare    Drug use: Never       Family history: History reviewed. No pertinent family history.    Vitals:  Vitals:    12/04/24 0706   BP:    Pulse:    Resp:    Temp: 98.7 °F (37.1 °C)   SpO2:        Physical Exam  Vitals and nursing note reviewed.   Constitutional:       General: He is not in acute distress.     Appearance: He is well-developed.   HENT:      Head: Normocephalic and atraumatic.   Eyes:      Conjunctiva/sclera: Conjunctivae normal.   Cardiovascular:      Rate and Rhythm: Normal rate and regular rhythm.   Pulmonary:      Effort: Pulmonary effort is normal. No respiratory distress.   Musculoskeletal:         General: No swelling.      Cervical back: Neck supple.      Right lower leg: Edema present.   Skin:     General: Skin is warm and dry.      Capillary Refill: Capillary refill takes less than 2 seconds.   Neurological:      General: No focal deficit present.      Mental Status: He is alert and oriented to person, place, and time.   Psychiatric:         Mood and Affect: Mood normal.         Behavior: Behavior normal.         Recent Results (from the past 48 hours)   ECG 12 lead    Collection Time: 12/03/24  4:49 PM   Result Value Ref Range    Ventricular Rate 67 BPM    Atrial Rate 67 BPM    ME Interval 216 ms    QRSD Interval 92 ms    QT Interval 422 ms    QTC Interval 445 ms    P Axis 73 degrees    QRS Axis 52 degrees    T Wave Axis 64 degrees   CBC and differential    Collection Time: 12/03/24  5:02 PM   Result Value Ref Range    WBC 7.65 4.31 - 10.16 Thousand/uL    RBC 2.83 (L) 3.88 - 5.62 Million/uL     Hemoglobin 9.0 (L) 12.0 - 17.0 g/dL    Hematocrit 28.2 (L) 36.5 - 49.3 %     (H) 82 - 98 fL    MCH 31.8 26.8 - 34.3 pg    MCHC 31.9 31.4 - 37.4 g/dL    RDW 13.2 11.6 - 15.1 %    MPV 8.6 (L) 8.9 - 12.7 fL    Platelets 228 149 - 390 Thousands/uL    nRBC 0 /100 WBCs    Segmented % 67 43 - 75 %    Immature Grans % 2 0 - 2 %    Lymphocytes % 20 14 - 44 %    Monocytes % 8 4 - 12 %    Eosinophils Relative 2 0 - 6 %    Basophils Relative 1 0 - 1 %    Absolute Neutrophils 5.23 1.85 - 7.62 Thousands/µL    Absolute Immature Grans 0.12 0.00 - 0.20 Thousand/uL    Absolute Lymphocytes 1.51 0.60 - 4.47 Thousands/µL    Absolute Monocytes 0.59 0.17 - 1.22 Thousand/µL    Eosinophils Absolute 0.14 0.00 - 0.61 Thousand/µL    Basophils Absolute 0.06 0.00 - 0.10 Thousands/µL   Protime-INR    Collection Time: 12/03/24  5:02 PM   Result Value Ref Range    Protime 12.6 12.3 - 15.0 seconds    INR 0.89 0.85 - 1.19   APTT    Collection Time: 12/03/24  5:02 PM   Result Value Ref Range    PTT 25 23 - 34 seconds   Comprehensive metabolic panel    Collection Time: 12/03/24  5:02 PM   Result Value Ref Range    Sodium 138 135 - 147 mmol/L    Potassium 4.4 3.5 - 5.3 mmol/L    Chloride 104 96 - 108 mmol/L    CO2 30 21 - 32 mmol/L    ANION GAP 4 4 - 13 mmol/L    BUN 44 (H) 5 - 25 mg/dL    Creatinine 2.35 (H) 0.60 - 1.30 mg/dL    Glucose 102 65 - 140 mg/dL    Calcium 8.5 8.4 - 10.2 mg/dL    Corrected Calcium 9.5 8.3 - 10.1 mg/dL    AST 21 13 - 39 U/L    ALT 16 7 - 52 U/L    Alkaline Phosphatase 61 34 - 104 U/L    Total Protein 5.4 (L) 6.4 - 8.4 g/dL    Albumin 2.8 (L) 3.5 - 5.0 g/dL    Total Bilirubin 0.48 0.20 - 1.00 mg/dL    eGFR 27 ml/min/1.73sq m   D-Dimer    Collection Time: 12/03/24  5:02 PM   Result Value Ref Range    D-Dimer, Quant 14.14 (H) <0.50 ug/ml FEU   B-Type Natriuretic Peptide(BNP)    Collection Time: 12/03/24  5:02 PM   Result Value Ref Range    BNP 72 0 - 100 pg/mL   APTT    Collection Time: 12/04/24  1:27 AM   Result Value Ref  Range     (HH) 23 - 34 seconds   Basic metabolic panel    Collection Time: 12/04/24  4:24 AM   Result Value Ref Range    Sodium 141 135 - 147 mmol/L    Potassium 4.0 3.5 - 5.3 mmol/L    Chloride 108 96 - 108 mmol/L    CO2 28 21 - 32 mmol/L    ANION GAP 5 4 - 13 mmol/L    BUN 42 (H) 5 - 25 mg/dL    Creatinine 2.19 (H) 0.60 - 1.30 mg/dL    Glucose 88 65 - 140 mg/dL    Glucose, Fasting 88 65 - 99 mg/dL    Calcium 8.1 (L) 8.4 - 10.2 mg/dL    eGFR 29 ml/min/1.73sq m   CBC and differential    Collection Time: 12/04/24  4:24 AM   Result Value Ref Range    WBC 8.15 4.31 - 10.16 Thousand/uL    RBC 2.65 (L) 3.88 - 5.62 Million/uL    Hemoglobin 8.4 (L) 12.0 - 17.0 g/dL    Hematocrit 26.4 (L) 36.5 - 49.3 %     (H) 82 - 98 fL    MCH 31.7 26.8 - 34.3 pg    MCHC 31.8 31.4 - 37.4 g/dL    RDW 13.6 11.6 - 15.1 %    MPV 8.7 (L) 8.9 - 12.7 fL    Platelets 199 149 - 390 Thousands/uL    nRBC 0 /100 WBCs    Segmented % 57 43 - 75 %    Immature Grans % 1 0 - 2 %    Lymphocytes % 29 14 - 44 %    Monocytes % 9 4 - 12 %    Eosinophils Relative 3 0 - 6 %    Basophils Relative 1 0 - 1 %    Absolute Neutrophils 4.65 1.85 - 7.62 Thousands/µL    Absolute Immature Grans 0.11 0.00 - 0.20 Thousand/uL    Absolute Lymphocytes 2.37 0.60 - 4.47 Thousands/µL    Absolute Monocytes 0.71 0.17 - 1.22 Thousand/µL    Eosinophils Absolute 0.23 0.00 - 0.61 Thousand/µL    Basophils Absolute 0.08 0.00 - 0.10 Thousands/µL   Magnesium    Collection Time: 12/04/24  4:24 AM   Result Value Ref Range    Magnesium 2.2 1.9 - 2.7 mg/dL       Imaging Studies:   XR chest 1 view portable  Result Date: 12/4/2024  Narrative: XR CHEST PORTABLE INDICATION: sob. COMPARISON: 12/19/2019 FINDINGS: Clear lungs. No pneumothorax or pleural effusion. Normal cardiomediastinal silhouette. Bones are unremarkable for age. Normal upper abdomen.     Impression: No acute cardiopulmonary disease. Workstation performed: [id number]     IR tunneled dialysis catheter removal  Result  Date: 11/13/2024  Narrative: Perma-Cath removal. Clinical History: Patient presenting for Perma-Cath removal. The existing catheter was prepped and draped in the usual sterile fashion. Lidocaine was administered the skin and subcutaneous tissues. The cuff was dissected free, and the catheter was removed. Manual compression was applied to the puncture site and tunnel until hemostasis was achieved. The patient tolerated the procedure well and suffered no complications.     Impression: Impression: Successful Perma-Cath removal as described. Workstation performed: [id number]       Counseling / Coordination of Care  Total floor / unit time spent today 75 minutes. Greater than 50% of total time was spent with the patient and / or family counseling and / or coordination of care.   RAY Nash    Please note:  This report has been generated by voice recognition software system. Therefore, there may be syntax, spelling and/or grammatical errors.  Please call if you have any questions.

## 2024-12-04 NOTE — ASSESSMENT & PLAN NOTE
Recent diagnosis. Following with [facility]. Scheduled to start Rituxan on 12/10.

## 2024-12-04 NOTE — UTILIZATION REVIEW
Initial Clinical Review    Admission: Date/Time/Statement:   Admission Orders (From admission, onward)       Ordered        12/03/24 1906  Place in Observation  Once                          Orders Placed This Encounter   Procedures    Place in Observation     Standing Status:   Standing     Number of Occurrences:   1     Level of Care:   Med Surg [16]     ED Arrival Information       Expected   -    Arrival   12/3/2024 16:31    Acuity   Urgent              Means of arrival   Walk-In    Escorted by   Family Member    Service   Hospitalist    Admission type   Emergency              Arrival complaint   R leg swelling & painful             Chief Complaint   Patient presents with    Leg Swelling     Pt reports was dx with SALLIE. Pt losing weight but states right leg bigger than left and painful to touch       Initial Presentation: 69 y.o. male who presented with family to [facility] ED. Admitted as Observation for evaluation and treatment of DVT R LE and SOB.      PMHx:  has a past medical history of Acute nephrotic syndrome, Disease of thyroid gland, and Mantle cell lymphoma (HCC).      Presented w/ right lower leg swelling, redness with pain to touch and ambulation since this morning. Pt also reports SOBD w/ deep breaths over the past month. On exam, Edema B/L LE - RLE tenderness w/ 3+ edema. Abnormal Labs Bun/Creat 44/2.35, Total protein 5.4, Albumin 2.8, H/H 9.0/28.2, D-dimer 14.14. Venous Duplex R LE - Acute non-occlusive DVT popliteal vein, posterior tibial veins, and peroneal veins. L LE Neg DVT. Pt unable to have contrast due to GFR. See below med list for meds given in ED.     Plan: Continue heparin gtt. VQ scan, Echo. Continue home meds. Trend H/H and transfuse <7. Hematology and Nephrology consulted.    Date: 12/4/24   Day 2:   Abnormal labs: TSH 4.916, Bun/Creat 42/2.19, H/H 8.4/26.4    Nephrology: Acute kidney injury, nephrotic syndrome and minimal-change disease. Baseline creat 0.95 as of June  2024. Recent hospital admission at [facility] for nephrotic syndrome. Renal biopsy revealed minimal-change disease. Currently on steroids/steroid taper. Pt required short term dialysis and recovered after 2 wks of dialysis. Plan: Check UA, UPCR, Bladder scan, Hold parameters adjusted on losartan. Taper Torsemide dose to 40mg daily. CMP in am.     ED Treatment-Medication Administration from 12/03/2024 1630 to 12/04/2024 0159         Date/Time Order Dose Route Action     12/03/2024 1915 heparin (porcine) injection 7,200 Units 7,200 Units Intravenous Given     12/03/2024 1914 heparin (porcine) 25,000 units in 0.45% NaCl 250 mL infusion (premix) 18 Units/kg/hr Intravenous New Bag            Scheduled Medications:  cholecalciferol, 2,000 Units, Oral, Daily  docusate sodium, 100 mg, Oral, Daily  DULoxetine, 20 mg, Oral, Daily  famotidine, 20 mg, Oral, Daily  levothyroxine, 100 mcg, Oral, Early Morning  [START ON 12/5/2024] losartan, 25 mg, Oral, Daily  predniSONE, 2.5 mg, Oral, Daily   Followed by  [START ON 12/7/2024] predniSONE, 1 mg, Oral, Daily  [START ON 12/5/2024] sulfamethoxazole-trimethoprim, 1 tablet, Oral, Daily  tadalafil, 5 mg, Oral, Daily  [START ON 12/5/2024] torsemide, 40 mg, Oral, Daily      Continuous IV Infusions:  heparin (porcine), 3-30 Units/kg/hr (Order-Specific), Intravenous, Titrated      PRN Meds:  acetaminophen, 650 mg, Oral, Q6H PRN  calcium carbonate, 500 mg, Oral, Daily PRN  heparin (porcine), 3,600 Units, Intravenous, Q6H PRN  heparin (porcine), 7,200 Units, Intravenous, Q6H PRN      ED Triage Vitals   Temperature Pulse Respirations Blood Pressure SpO2 Pain Score   12/03/24 1635 12/03/24 1635 12/03/24 1635 12/03/24 1635 12/03/24 1635 12/04/24 0200   98 °F (36.7 °C) 85 18 168/77 96 % 5     Weight (last 2 days)       Date/Time Weight    12/04/24 0706 90.3 (199)    12/04/24 0159 90.4 (199.3)    12/03/24 1635 90.3 (199.08)            Vital Signs (last 3 days)       Date/Time Temp  Pulse Resp BP MAP (mmHg) SpO2 O2 Device Patient Position - Orthostatic VS Javda Coma Scale Score Pain    12/04/24 1140 -- 79 20 117/62 81 97 % -- -- -- No Pain    12/04/24 1100 98.3 °F (36.8 °C) 79 16 -- -- 96 % -- -- -- --    12/04/24 0900 -- 81 18 -- -- 98 % -- -- -- --    12/04/24 0841 -- 76 22 123/63 81 97 % -- -- -- 3    12/04/24 0800 -- -- -- -- -- -- -- -- 15 --    12/04/24 0706 98.7 °F (37.1 °C) -- -- 132/67 -- -- None (Room air) Lying -- --    12/04/24 0431 98.4 °F (36.9 °C) 75 19 132/67 92 97 % None (Room air) Lying -- --    12/04/24 0218 -- -- -- -- -- -- -- -- -- 5    12/04/24 0200 -- 74 -- -- -- 96 % -- -- 15 5    12/04/24 0159 98.2 °F (36.8 °C) 78 14 138/65 -- -- None (Room air) Lying -- --    12/04/24 0000 -- 80 12 125/62 88 96 % -- -- -- --    12/03/24 2200 -- 75 15 153/72 103 100 % -- -- -- --    12/03/24 2100 -- 76 13 148/70 100 98 % -- -- -- --    12/03/24 2030 -- 79 16 145/77 105 93 % -- -- -- --    12/03/24 1930 -- 73 15 169/99 130 98 % -- -- -- --    12/03/24 1915 -- 69 14 160/77 111 99 % -- -- -- --    12/03/24 1800 -- 65 14 142/72 101 99 % -- -- -- --    12/03/24 1646 -- -- -- -- -- -- -- -- 15 --    12/03/24 1635 98 °F (36.7 °C) 85 18 168/77 -- 96 % -- Sitting -- --              Pertinent Labs/Diagnostic Test Results:   Radiology:  XR chest 1 view portable   ED Interpretation by Finn Dorantes DO (12/03 0738)   No acute abnl, no ptx, effusion, infiltrate, no obvious rib fracture, no widened mediastinum.  Interpreted by me              Final Interpretation by Tino Bonilla MD (12/04 0647)      No acute cardiopulmonary disease.            Workstation performed: [id number]         VAS VENOUS DUPLEX -LOWER LIMB UNILATERAL    (Results Pending)   NM inpatient order    (Results Pending)     Cardiology:  Echo follow up/limited w/ contrast if indicated   Final Result by Saumya James MD (12/04 1030)        Left Ventricle: Left ventricular cavity size is mildly dilated. Wall     thickness is mildly increased. There is concentric remodeling. The left    ventricular ejection fraction is 55%. Systolic function is normal. Global    longitudinal strain is mildly reduced at -17%. Wall motion is normal.    Diastolic function is mildly abnormal, consistent with grade I (abnormal)    relaxation.     Right Ventricle: Right ventricular cavity size is normal. Systolic    function is normal.     Left Atrium: The atrium is normal in size.     Right Atrium: The atrium is normal in size.     Aorta: The aortic root is mildly dilated. The ascending aorta is normal    in size. The aortic root is 4.60 cm.     Prior TTE study available for comparison. Prior study date: 6/1/2021.    No significant changes noted compared to the prior study.      Strain was performed to quantify interventricular dyssynchrony and    evaluate components of myocardial function due to LVH. Results from the    utilization of Strain Analysis are listed in the report below.               Results from last 7 days   Lab Units 12/04/24  0424 12/03/24  1702   WBC Thousand/uL 8.15 7.65   HEMOGLOBIN g/dL 8.4* 9.0*   HEMATOCRIT % 26.4* 28.2*   PLATELETS Thousands/uL 199 228   TOTAL NEUT ABS Thousands/µL 4.65 5.23     Results from last 7 days   Lab Units 12/04/24  0424   RETIC CT ABS  64,100   RETIC CT PCT % 2.42*     Results from last 7 days   Lab Units 12/04/24  0424 12/03/24  1702   SODIUM mmol/L 141 138   POTASSIUM mmol/L 4.0 4.4   CHLORIDE mmol/L 108 104   CO2 mmol/L 28 30   ANION GAP mmol/L 5 4   BUN mg/dL 42* 44*   CREATININE mg/dL 2.19* 2.35*   EGFR ml/min/1.73sq m 29 27   CALCIUM mg/dL 8.1* 8.5   MAGNESIUM mg/dL 2.2  --      Results from last 7 days   Lab Units 12/03/24  1702   AST U/L 21   ALT U/L 16   ALK PHOS U/L 61   TOTAL PROTEIN g/dL 5.4*   ALBUMIN g/dL 2.8*   TOTAL BILIRUBIN mg/dL 0.48         Results from last 7 days   Lab Units 12/04/24  0424 12/03/24  1702   GLUCOSE RANDOM mg/dL 88 102         Results from last 7 days   Lab  Units 12/03/24  1702   D-DIMER QUANTITATIVE ug/ml FEU 14.14*     Results from last 7 days   Lab Units 12/04/24  0907 12/04/24  0127 12/03/24  1702   PROTIME seconds  --   --  12.6   INR   --   --  0.89   PTT seconds 68* 122* 25     Results from last 7 days   Lab Units 12/04/24  0424   TSH 3RD GENERATON uIU/mL 4.916*     Results from last 7 days   Lab Units 12/03/24  1702   BNP pg/mL 72       Past Medical History:   Diagnosis Date    Acute nephrotic syndrome     Disease of thyroid gland     Mantle cell lymphoma (HCC)      Present on Admission:   Hypothyroid   Mantle cell lymphoma (HCC)   Nephrotic syndrome   Minimal change disease   Acute kidney injury (HCC)      Admitting Diagnosis: Leg pain [M79.606]  Dyspnea [R06.00]  DVT (deep venous thrombosis) (HCC) [I82.409]  Acute on chronic renal insufficiency [N28.9, N18.9]  Elevated d-dimer [R79.89]  Mantle cell lymphoma, unspecified body region (HCC) [C83.10]  Age/Sex: 69 y.o. male    Network Utilization Review Department  ATTENTION: Please call with any questions or concerns to [phone] and carefully listen to the prompts so that you are directed to the right person. All voicemails are confidential.   For Discharge needs, contact Care Management DC Support Team at [phone] opt. 2  Send all requests for admission clinical reviews, approved or denied determinations and any other requests to dedicated fax number below belonging to the campus where the patient is receiving treatment. List of dedicated fax numbers for the Facilities:  FACILITY NAME UR FAX NUMBER   ADMISSION DENIALS (Administrative/Medical Necessity) [phone]   DISCHARGE SUPPORT TEAM (NETWORK) [phone]   PARENT CHILD HEALTH (Maternity/NICU/Pediatrics) [phone]   [facility] [phone]   [facility] [phone]   [facility] [phone]   [facility] [phone]   [facility]  [facility] [phone]   [facility] [phone]   [facility] [phone]   [facility] [phone]   [facility] [phone]   [facility] [phone]   [facility] [phone]   [facility] [phone]

## 2024-12-04 NOTE — TELEPHONE ENCOUNTER
----- Message from Jimy Sheffield MD sent at 12/4/2024 10:36 AM EST -----  Let Josesito know echo is normal.

## 2024-12-04 NOTE — ASSESSMENT & PLAN NOTE
Hg 9.0   Had been 10.5 about 3 weeks ago.   Here with DVT and worsening creatinine   Denies hematuria, hemoptysis or known melena   Patient on heparin drip due to confirmed DVT. Monitor H&H.   Transfuse < 7  Hematology consulted, appreciate recommendations.  Iron panel, folate and vitamin B12 in process.

## 2024-12-04 NOTE — ASSESSMENT & PLAN NOTE
Baseline creatinine 0.95 as of June 2024  Recent hospital admission at [facility] for nephrotic syndrome.  Renal biopsy revealed minimal-change disease.  Currently on steroids/steroid taper  During recent hospitalization in October required short-term dialysis with subsequent recovery after approximately 2 weeks of dialysis.  Creatinine reached a alvaro of 1.75 on 11/8 which was a follow-up creatinine after dialysis discontinued  Patient has been maintained on torsemide 100 mg daily  Likely near dry weight although still has lower extremity edema weight loss suspected/decrease in muscle mass.  Patient reports that his home weight was down to 195 pounds.  His dry weight is near that weight  Admitted 12/3 creatinine 2.35.  Patient reports no change in oral intake.  Not using NSAIDs.  On steroid taper for treatment of minimal-change disease  Repeat creatinine 2.15  Patient noted to decline in urine prior to admission with increasing swelling right lower extremity.  Found to have acute DVT on admission.  Current status: Slight improvement in creatinine since admission.  Baseline unclear at this time.  Possibly component related to overdiuresis.  May need to allow the patient to equilibrate.  At this time will decrease torsemide dose and monitor  Plan:  Check urinalysis  Check UPCR,  Bladder scan, urinary retention protocol  Hold parameters adjusted on losartan  Taper torsemide dose to 40 mg daily  Check labs in the a.m.  Supportive care

## 2024-12-04 NOTE — CONSULTS
NEPHROLOGY HOSPITAL CONSULTATION   Angel Escoto 69 y.o. male MRN: [id number]  Unit/Bed#: -01 SDU Encounter: [id number]      Assessment & Plan  Acute kidney injury (HCC)  Baseline creatinine 0.95 as of June 2024  Recent hospital admission at [facility] for nephrotic syndrome.  Renal biopsy revealed minimal-change disease.  Currently on steroids/steroid taper  During recent hospitalization in October required short-term dialysis with subsequent recovery after approximately 2 weeks of dialysis.  Creatinine reached a alvaro of 1.75 on 11/8 which was a follow-up creatinine after dialysis discontinued  Patient has been maintained on torsemide 100 mg daily  Likely near dry weight although still has lower extremity edema weight loss suspected/decrease in muscle mass.  Patient reports that his home weight was down to 195 pounds.  His dry weight is near that weight  Admitted 12/3 creatinine 2.35.  Patient reports no change in oral intake.  Not using NSAIDs.  On steroid taper for treatment of minimal-change disease  Repeat creatinine 2.15  Patient noted to decline in urine prior to admission with increasing swelling right lower extremity.  Found to have acute DVT on admission.  Current status: Slight improvement in creatinine since admission.  Baseline unclear at this time.  Possibly component related to overdiuresis.  May need to allow the patient to equilibrate.  At this time will decrease torsemide dose and monitor  Plan:  Check urinalysis  Check UPCR,  Bladder scan, urinary retention protocol  Hold parameters adjusted on losartan  Taper torsemide dose to 40 mg daily  Check labs in the a.m.  Supportive care  Nephrotic syndrome  Etiology: Minimal-change disease diagnosed October 2024  Treatment regimen initially on IV steroids for induction.  Discharged from [facility] on October 21 on prednisone 60 mg daily.  Now followed by Dr. Griffith for management in addition to Dr. Kel Isaac at [facility]  On  prednisone taper currently 2.5 mg daily which started on 12/3/2024  Albumin level remains low 2.8.  Reached a alvaro of 2.5 previously  On losartan 25 mg daily and torsemide 100 mg daily  Patient presents with DVT.  Nephrotic syndrome results in increased risk of thrombotic complications such as DVT.  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  Edema improving.  Primarily in the lower extremities now acute right lower extremity edema due to DVT  Minimal change disease  Baseline creatinine near 0.95 as of June 2024  Patient presented in October with SALLIE, creatinine of 3.31 with nephrotic syndrome and found to have minimal-change disease.  He required short-term hemodialysis (approximately 2 weeks) with subsequent renal recovery.  After dialysis discontinued follow-up creatinine 1.75 on 11/8/2024  On torsemide 100 mg daily and losartan 25 mg daily  Blood pressure adequately controlled.  Volume status improving  He presents on 12/3 creatinine 2.35 with repeat level 2.19  Acute deep vein thrombosis (DVT) of right lower extremity (HCC)  In the setting of nephrotic syndrome  On heparin infusion  Hypothyroid    Mantle cell lymphoma (HCC)  Patient scheduled to start on rituximab next week  Anemia  Management per primary team  SOB (shortness of breath)  Awaiting VQ scan  Admitted with acute DVT  Echo results pending    I have reviewed the nephrology recommendations including taper dose of torsemide, with patient and his wife, and we are in agreement with renal plan including the information outlined above.    HISTORY OF PRESENT ILLNESS:  Requesting Physician: Magdalene Silver MD  Reason for Consult: Acute kidney injury, nephrotic syndrome and minimal-change disease    Angel Escoto is a 69 y.o. male who was admitted to [facility] after presenting with acute swelling right lower extremity with calf pain.  Patient reports that he was performing steroid taper as directed by his nephrologist, Dr. Griffith.  He is being treated  for minimal-change disease which was diagnosed in October.  He required approximately 2 weeks of dialysis with renal recovery.  He has been maintained on torsemide 100 mg daily.  He was urinating well up until several days ago.  Noted decrease in urine output.  Literally overnight his right lower extremity began swelling acutely.  He had total body anasarca when he was initially diagnosed with nephrotic syndrome/minimal-change disease.  He was on IV steroids which have been switched to oral dose with taper.  His last taper was on December 3.  Prednisone decreased to 2.5 mg daily.  He primarily has foot edema according to Angel.  He intermittently has shortness of breath -a feeling like he has to take some deep breaths in order to get more oxygen.  He is not eating salt.  No use of NSAIDs.  A renal consultation is requested today for assistance in the management of SALLIE/minimal-change disease/nephrotic syndrome.    PAST MEDICAL HISTORY:  Past Medical History:   Diagnosis Date    Acute nephrotic syndrome     Disease of thyroid gland     Mantle cell lymphoma (HCC)        PAST SURGICAL HISTORY:  Past Surgical History:   Procedure Laterality Date    ABLATION OF DYSRHYTHMIC FOCUS      IR TUNNELED DIALYSIS CATHETER REMOVAL  11/13/2024       ALLERGIES:  Allergies   Allergen Reactions    Hydromorphone Hallucinations, Other (See Comments), Delirium, Confusion and Visual Disturbance     hallucinosis    Patient saw things that were not there    Other reaction(s): Delirium, Hallucinations, Mental Status Changes, Other (see comments), Visual Disturbance   hallucinosis   hallucinosis   hallucinosis   hallucinosis   Patient saw things that were not there    hallucinosis   hallucinosis   Patient saw things that were not there    hallucinosis   hallucinosis   hallucinosis   hallucinosis   Patient saw things that were not there    hallucinosis   hallucinosis   hallucinosis   hallucinosis   hallucinosis   hallucinosis   Patient saw  things that were not there    hallucinosis   hallucinosis   Patient saw things that were not there    hallucinosis    Medical Tape Rash     Itches    Skin irritate with EKG leads       SOCIAL HISTORY:  Social History     Substance and Sexual Activity   Alcohol Use Yes    Comment: rare     Social History     Substance and Sexual Activity   Drug Use Never     Social History     Tobacco Use   Smoking Status Never   Smokeless Tobacco Never       FAMILY HISTORY:  History reviewed. No pertinent family history.    MEDICATIONS:    Current Facility-Administered Medications:     acetaminophen (TYLENOL) tablet 650 mg, 650 mg, Oral, Q6H PRN, Edna Alex PA-C, 650 mg at 12/04/24 0841    atovaquone (MEPRON) oral suspension 1,500 mg, 1,500 mg, Oral, Daily With Breakfast, Edna Alex PA-C, 1,500 mg at 12/04/24 0844    calcium carbonate (TUMS) chewable tablet 500 mg, 500 mg, Oral, Daily PRN, Edna Alex PA-C    Cholecalciferol (VITAMIN D3) tablet 2,000 Units, 2,000 Units, Oral, Daily, Edna Alex PA-C, 2,000 Units at 12/04/24 0854    docusate sodium (COLACE) capsule 100 mg, 100 mg, Oral, Q12H PRN, Magdalene Silver MD    DULoxetine (CYMBALTA) delayed release capsule 20 mg, 20 mg, Oral, Daily, Magdalene Silver MD    famotidine (PEPCID) tablet 20 mg, 20 mg, Oral, Daily, Edna Alex PA-C, 20 mg at 12/04/24 0844    heparin (porcine) 25,000 units in 0.45% NaCl 250 mL infusion (premix), 3-30 Units/kg/hr (Order-Specific), Intravenous, Titrated, Edna Alex PA-C, Last Rate: 13.5 mL/hr at 12/04/24 0938, 15 Units/kg/hr at 12/04/24 0938    heparin (porcine) injection 3,600 Units, 3,600 Units, Intravenous, Q6H PRN, Edna Alex PA-C    heparin (porcine) injection 7,200 Units, 7,200 Units, Intravenous, Q6H PRN, Edna Alex PA-C    levothyroxine tablet 100 mcg, 100 mcg, Oral, Early Morning, Edna Alex PA-C, 100 mcg at 12/04/24 0508    losartan (COZAAR) tablet 25 mg, 25 mg, Oral, Daily, Edna Alex PA-C, 25 mg at 12/04/24 0844    predniSONE  tablet 2.5 mg, 2.5 mg, Oral, Daily, 2.5 mg at 12/04/24 0844 **FOLLOWED BY** [START ON 12/7/2024] predniSONE tablet 1 mg, 1 mg, Oral, Daily, Edna Alex PA-C    tadalafil (CIALIS) tablet 5 mg, 5 mg, Oral, Daily, Edna Alex PA-C, 5 mg at 12/04/24 0858    torsemide (DEMADEX) tablet 100 mg, 100 mg, Oral, Daily, Edna Alex PA-C, 100 mg at 12/04/24 0843    REVIEW OF SYSTEMS:  Constitutional: No fevers or chills  HENT: Negative for postnasal drip  Eyes: Negative for visual disturbance.   Respiratory: Negative for cough.  Positive for intermittent periods of shortness of breath  Cardiovascular: Negative for chest pain, palpitations.  Positive for leg swelling.  Overnight right leg swelling  Gastrointestinal: Negative for abdominal pain, constipation, diarrhea, nausea and vomiting.   Genitourinary: No dysuria, hematuria.  Making less urine recently  Musculoskeletal: Negative for arthralgias, back pain and joint swelling.   Skin: Negative for rash.   Neurological: Negative for focal weakness, headaches, dizziness.  Hematological: Negative for easy bruising or bleeding.  Psychiatric/Behavioral: Negative for confusion and sleep disturbance.   All the systems were reviewed and were negative except as documented on the HPI.    PHYSICAL EXAM:  Current Weight: Weight - Scale: 90.3 kg (199 lb)  First Weight: Weight - Scale: 90.3 kg (199 lb 1.2 oz)  Vitals:    12/04/24 0200 12/04/24 0431 12/04/24 0706 12/04/24 0841   BP:  132/67 132/67 123/63   BP Location:  Right arm Left arm    Pulse: 74 75  76   Resp:  19  22   Temp:  98.4 °F (36.9 °C) 98.7 °F (37.1 °C)    TempSrc:  Oral Oral    SpO2: 96% 97%  97%   Weight:   90.3 kg (199 lb)    Height:   6' (1.829 m)        Intake/Output Summary (Last 24 hours) at 12/4/2024 1002  Last data filed at 12/4/2024 0901  Gross per 24 hour   Intake 510.76 ml   Output 725 ml   Net -214.24 ml     Physical Exam  Constitutional:       General: He is not in acute distress.     Appearance: He is  well-developed. He is not ill-appearing, toxic-appearing or diaphoretic.   HENT:      Head: Normocephalic and atraumatic.      Nose: Nose normal. No congestion.      Mouth/Throat:      Mouth: Mucous membranes are moist.      Pharynx: No oropharyngeal exudate.   Eyes:      General: No scleral icterus.        Right eye: No discharge.         Left eye: No discharge.      Extraocular Movements: Extraocular movements intact.      Conjunctiva/sclera: Conjunctivae normal.   Neck:      Thyroid: No thyromegaly.      Vascular: No carotid bruit or JVD.      Trachea: No tracheal deviation.   Cardiovascular:      Rate and Rhythm: Normal rate and regular rhythm.      Heart sounds: No murmur heard.     No friction rub. No gallop.      Comments: Left lower extremity +1 pretibial edema, +2 pedal edema.  Right lower extremity edema +2 to +3 edema.  Severe ankle edema noted.  Pedal edema noted.  Right calf tenderness  Pulmonary:      Effort: Pulmonary effort is normal.      Breath sounds: Normal breath sounds.   Abdominal:      General: Bowel sounds are normal. There is no distension.      Palpations: Abdomen is soft. There is no mass.      Tenderness: There is no abdominal tenderness. There is no guarding or rebound.   Musculoskeletal:         General: Swelling and tenderness present. No signs of injury. Normal range of motion.      Cervical back: Normal range of motion and neck supple. No rigidity or tenderness.      Right lower leg: Edema present.      Left lower le+ Edema present.   Skin:     General: Skin is warm and dry.      Coloration: Skin is not jaundiced or pale.      Findings: Erythema present. No rash.   Neurological:      Mental Status: He is alert and oriented to person, place, and time.   Psychiatric:         Behavior: Behavior normal.         Thought Content: Thought content normal.         Judgment: Judgment normal.           Invasive Devices:      Lab Results:   Results from last 7 days   Lab Units  "12/04/24  0424 12/03/24  1702   WBC Thousand/uL 8.15 7.65   HEMOGLOBIN g/dL 8.4* 9.0*   HEMATOCRIT % 26.4* 28.2*   PLATELETS Thousands/uL 199 228   POTASSIUM mmol/L 4.0 4.4   CHLORIDE mmol/L 108 104   CO2 mmol/L 28 30   BUN mg/dL 42* 44*   CREATININE mg/dL 2.19* 2.35*   CALCIUM mg/dL 8.1* 8.5   MAGNESIUM mg/dL 2.2  --    ALK PHOS U/L  --  61   ALT U/L  --  16   AST U/L  --  21     Other Studies:     Portions of the record may have been created with voice recognition software. Occasional wrong word or \"sound a like\" substitutions may have occurred due to the inherent limitations of voice recognition software. Read the chart carefully and recognize, using context, where substitutions have occurred.If you have any questions, please contact the dictating provider.   "

## 2024-12-05 VITALS
WEIGHT: 199 LBS | SYSTOLIC BLOOD PRESSURE: 117 MMHG | OXYGEN SATURATION: 94 % | HEIGHT: 72 IN | HEART RATE: 77 BPM | DIASTOLIC BLOOD PRESSURE: 65 MMHG | RESPIRATION RATE: 20 BRPM | BODY MASS INDEX: 26.95 KG/M2 | TEMPERATURE: 98.1 F

## 2024-12-05 PROBLEM — E83.39 HYPERPHOSPHATEMIA: Status: ACTIVE | Noted: 2024-12-05

## 2024-12-05 PROBLEM — N18.9 ACUTE ON CHRONIC RENAL INSUFFICIENCY: Status: ACTIVE | Noted: 2024-12-05

## 2024-12-05 PROBLEM — K59.01 SLOW TRANSIT CONSTIPATION: Status: ACTIVE | Noted: 2024-12-05

## 2024-12-05 PROBLEM — N28.9 ACUTE ON CHRONIC RENAL INSUFFICIENCY: Status: ACTIVE | Noted: 2024-12-05

## 2024-12-05 LAB
ANION GAP SERPL CALCULATED.3IONS-SCNC: 6 MMOL/L (ref 4–13)
BUN SERPL-MCNC: 36 MG/DL (ref 5–25)
CALCIUM SERPL-MCNC: 8 MG/DL (ref 8.4–10.2)
CHLORIDE SERPL-SCNC: 108 MMOL/L (ref 96–108)
CO2 SERPL-SCNC: 27 MMOL/L (ref 21–32)
CREAT SERPL-MCNC: 1.97 MG/DL (ref 0.6–1.3)
ERYTHROCYTE [DISTWIDTH] IN BLOOD BY AUTOMATED COUNT: 13.4 % (ref 11.6–15.1)
GFR SERPL CREATININE-BSD FRML MDRD: 33 ML/MIN/1.73SQ M
GLUCOSE P FAST SERPL-MCNC: 89 MG/DL (ref 65–99)
GLUCOSE SERPL-MCNC: 89 MG/DL (ref 65–140)
HCT VFR BLD AUTO: 26.8 % (ref 36.5–49.3)
HGB BLD-MCNC: 8.6 G/DL (ref 12–17)
MCH RBC QN AUTO: 32 PG (ref 26.8–34.3)
MCHC RBC AUTO-ENTMCNC: 32.1 G/DL (ref 31.4–37.4)
MCV RBC AUTO: 100 FL (ref 82–98)
PLATELET # BLD AUTO: 263 THOUSANDS/UL (ref 149–390)
PMV BLD AUTO: 8.4 FL (ref 8.9–12.7)
POTASSIUM SERPL-SCNC: 4.2 MMOL/L (ref 3.5–5.3)
RBC # BLD AUTO: 2.69 MILLION/UL (ref 3.88–5.62)
SODIUM SERPL-SCNC: 141 MMOL/L (ref 135–147)
WBC # BLD AUTO: 7.83 THOUSAND/UL (ref 4.31–10.16)

## 2024-12-05 PROCEDURE — 85027 COMPLETE CBC AUTOMATED: CPT | Performed by: INTERNAL MEDICINE

## 2024-12-05 PROCEDURE — 80048 BASIC METABOLIC PNL TOTAL CA: CPT | Performed by: INTERNAL MEDICINE

## 2024-12-05 PROCEDURE — 99239 HOSP IP/OBS DSCHRG MGMT >30: CPT | Performed by: INTERNAL MEDICINE

## 2024-12-05 PROCEDURE — 99232 SBSQ HOSP IP/OBS MODERATE 35: CPT | Performed by: INTERNAL MEDICINE

## 2024-12-05 RX ORDER — DOCUSATE SODIUM 100 MG/1
100 CAPSULE, LIQUID FILLED ORAL 2 TIMES DAILY
Qty: 60 CAPSULE | Refills: 0 | Status: SHIPPED | OUTPATIENT
Start: 2024-12-05

## 2024-12-05 RX ORDER — DOCUSATE SODIUM 100 MG/1
100 CAPSULE, LIQUID FILLED ORAL 2 TIMES DAILY
Status: DISCONTINUED | OUTPATIENT
Start: 2024-12-05 | End: 2024-12-05 | Stop reason: HOSPADM

## 2024-12-05 RX ADMIN — DOCUSATE SODIUM 100 MG: 100 CAPSULE, LIQUID FILLED ORAL at 09:03

## 2024-12-05 RX ADMIN — TADALAFIL 5 MG: 5 TABLET ORAL at 09:31

## 2024-12-05 RX ADMIN — ACETAMINOPHEN 650 MG: 325 TABLET, FILM COATED ORAL at 07:44

## 2024-12-05 RX ADMIN — FAMOTIDINE 20 MG: 20 TABLET, FILM COATED ORAL at 09:02

## 2024-12-05 RX ADMIN — LOSARTAN POTASSIUM 25 MG: 25 TABLET, FILM COATED ORAL at 09:01

## 2024-12-05 RX ADMIN — DULOXETINE HYDROCHLORIDE 20 MG: 20 CAPSULE, DELAYED RELEASE ORAL at 09:03

## 2024-12-05 RX ADMIN — SULFAMETHOXAZOLE AND TRIMETHOPRIM 1 TABLET: 800; 160 TABLET ORAL at 09:03

## 2024-12-05 RX ADMIN — PREDNISONE 2.5 MG: 1 TABLET ORAL at 09:00

## 2024-12-05 RX ADMIN — APIXABAN 10 MG: 5 TABLET, FILM COATED ORAL at 08:59

## 2024-12-05 RX ADMIN — Medication 2000 UNITS: at 09:02

## 2024-12-05 RX ADMIN — LEVOTHYROXINE SODIUM 100 MCG: 100 TABLET ORAL at 04:22

## 2024-12-05 NOTE — ASSESSMENT & PLAN NOTE
Recent diagnosis. Following with [facility]. Scheduled to start Rituxan on 12/10.

## 2024-12-05 NOTE — ASSESSMENT & PLAN NOTE
VQ scan with low probability for PE  Echo: EF 55%, grade 1 diastolic dysfunction, IVC normal size  Chest x-ray: No acute cardiopulmonary disease

## 2024-12-05 NOTE — ASSESSMENT & PLAN NOTE
Right lower legs painful at calf, erythema and increased swelling   Ddimer elevated   Venous duplex   Right lower limb   Evaluation shows acute non-occlusive thrombosis in the popliteal vein, posterior tibial veins, and peroneal veins.  No evidence of superficial thrombophlebitis noted.  Doppler evaluation shows a normal response to augmentation maneuvers.  Popliteal, posterior tibial and anterior tibial arterial Doppler waveforms are triphasic.  Left lower limb negative   Patient has risk factors as nephrotic syndrome with hypercoagulable state and mantle cell lymphoma.  Started on Eliquis 10 mg twice daily for 7 days, will continue with 5 mg twice daily afterwards.  Follow-up with hematology oncology

## 2024-12-05 NOTE — PROGRESS NOTES
Intended introductory visit with pt due to ICU admission. Pt resting comfortably. Interfaith blessing offered outside of pt room. Available to follow upon request.     Thank you!

## 2024-12-05 NOTE — ASSESSMENT & PLAN NOTE
Hg 9.0   Had been 10.5 about 3 weeks ago.   Here with DVT and worsening creatinine   Denies hematuria, hemoptysis or known melena   Patient on heparin drip due to confirmed DVT. Monitor H&H.   Transfuse < 7  Vitamin B12, folate normal.  Iron panel consistent with anemia of chronic disease.

## 2024-12-05 NOTE — PROGRESS NOTES
NEPHROLOGY PROGRESS NOTE   Angel Escoto 69 y.o. male MRN: [id number]  Unit/Bed#: -01 SDU Encounter: [id number]      Assessment & Plan  Acute kidney injury (HCC)  Baseline creatinine 0.95 as of June 2024  Recent hospital admission at [facility] for nephrotic syndrome.  Renal biopsy revealed minimal-change disease.  Currently on steroids/steroid taper  During recent hospitalization in October required short-term dialysis with subsequent recovery after approximately 2 weeks of dialysis.  Creatinine trending down a little the past few days to 1.97 today  Status post Lasix 80 mg IV x 1 yesterday  Was on torsemide 100 mg daily at home  Will discuss with attending regarding need for further IV diuretics today versus resuming oral torsemide  Nephrotic syndrome  Due to biopsy-proven minimal-change disease   initially on IV steroids for induction.  Discharged from [facility] on October 21 on prednisone 60 mg daily.  Now followed by Dr. Vazquez for management in addition to Dr. Kel Isaac at [facility]  On prednisone taper currently 2.5 mg daily which started on 12/3/2024  Still had 6.2 g proteinuria on UPC ratio yesterday  Continue losartan 25 mg daily  Likely starting Rituxan as an outpatient  Acute deep vein thrombosis (DVT) of right lower extremity (HCC)  In the setting of nephrotic syndrome and malignancy  On heparin infusion  Heme-onc on board  Mantle cell lymphoma (HCC)  Patient scheduled to start on rituximab next week  Anemia  Hemoglobin 8.6  Ferritin 408, iron saturation 39%  SOB (shortness of breath)  VQ scan with low probability for PE  Echo: EF 55%, grade 1 diastolic dysfunction, IVC normal size  Chest x-ray: No acute cardiopulmonary disease  Hyperphosphatemia  Phosphorus 4.3  Patient was taking sevelamer 1 tab 3 times daily with meals so we will resume at this time  Continue low phosphorus diet      Plan Summary:   Will d/w attending regarding further IV lasix vs resuming torsemide po   Check  am BMP     SUBJECTIVE:  Feeling okay today but still concerned about the swelling in his legs.  Denies current chest pain or shortness of breath.  Good urine output yesterday.    OBJECTIVE:  Current Weight: Weight - Scale: 90.3 kg (199 lb)  Vitals:    12/05/24 0744   BP: 120/72   Pulse: 76   Resp: 16   Temp: 98.8 °F (37.1 °C)   SpO2: 97%       Intake/Output Summary (Last 24 hours) at 12/5/2024 1035  Last data filed at 12/5/2024 0906  Gross per 24 hour   Intake 1567.76 ml   Output 3350 ml   Net -1782.24 ml       General:  appears comfortable and in no acute distress   Skin:  No rash  Eyes:  Sclerae anicteric, no periorbital edema   ENT:  Moist mucous membranes  Neck:  Trachea midline, symmetric   Chest:  Clear to auscultation bilaterally with no wheezes, rales or rhonchi  CVS:  Regular rate and rhythm  Abdomen:  Soft, nontender, nondistended  Neuro:  Awake and alert  Psych:  Appropriate affect  Extremities: Bilateral lower extremity edema right greater than left        Medications:  Scheduled Meds:  Current Facility-Administered Medications   Medication Dose Route Frequency Provider Last Rate    acetaminophen  650 mg Oral Q6H PRN Edna Alex PA-C      apixaban  10 mg Oral BID Magdalene Silver MD      Followed by    [START ON 12/11/2024] apixaban  5 mg Oral BID Magdalene Silver MD      calcium carbonate  500 mg Oral Daily PRN Edna Alex PA-C      cholecalciferol  2,000 Units Oral Daily Edna Alex PA-C      docusate sodium  100 mg Oral Daily Magdalene Silver MD      DULoxetine  20 mg Oral Daily Magdalene Silver MD      famotidine  20 mg Oral Daily Edna Alex PA-C      levothyroxine  100 mcg Oral Early Morning Edna Alex PA-C      losartan  25 mg Oral Daily RAY Rider      predniSONE  2.5 mg Oral Daily Edna Alex PA-C      Followed by    [START ON 12/7/2024] predniSONE  1 mg Oral Daily Edna Alex PA-C      sulfamethoxazole-trimethoprim  1 tablet Oral Daily Magdalene Silver MD       tadalafil  5 mg Oral Daily Edna Alex PA-C         PRN Meds:.  acetaminophen    calcium carbonate      Laboratory Results:  Results from last 7 days   Lab Units 12/05/24  0425 12/04/24  1151 12/04/24  0424 12/03/24  1702   WBC Thousand/uL 7.83  --  8.15 7.65   HEMOGLOBIN g/dL 8.6*  --  8.4* 9.0*   HEMATOCRIT % 26.8*  --  26.4* 28.2*   PLATELETS Thousands/uL 263  --  199 228   SODIUM mmol/L 141  --  141 138   POTASSIUM mmol/L 4.2  --  4.0 4.4   CHLORIDE mmol/L 108  --  108 104   CO2 mmol/L 27  --  28 30   BUN mg/dL 36*  --  42* 44*   CREATININE mg/dL 1.97*  --  2.19* 2.35*   CALCIUM mg/dL 8.0*  --  8.1* 8.5   MAGNESIUM mg/dL  --   --  2.2  --    PHOSPHORUS mg/dL  --  4.3*  --   --

## 2024-12-05 NOTE — ASSESSMENT & PLAN NOTE
Lab Results   Component Value Date    EGFR 33 12/05/2024    EGFR 29 12/04/2024    EGFR 27 12/03/2024    CREATININE 1.97 (H) 12/05/2024    CREATININE 2.19 (H) 12/04/2024    CREATININE 2.35 (H) 12/03/2024

## 2024-12-05 NOTE — ASSESSMENT & PLAN NOTE
Baseline creatinine 0.95 as of June 2024  Recent hospital admission at [facility] for nephrotic syndrome.  Renal biopsy revealed minimal-change disease.  Currently on steroids/steroid taper  During recent hospitalization in October required short-term dialysis with subsequent recovery after approximately 2 weeks of dialysis.  Creatinine trending down a little the past few days to 1.97 today  Status post Lasix 80 mg IV x 1 yesterday  Was on torsemide 100 mg daily at home  Will discuss with attending regarding need for further IV diuretics today versus resuming oral torsemide

## 2024-12-05 NOTE — ASSESSMENT & PLAN NOTE
Phosphorus 4.3  Patient was taking sevelamer 1 tab 3 times daily with meals so we will resume at this time  Continue low phosphorus diet

## 2024-12-05 NOTE — ASSESSMENT & PLAN NOTE
Lab Results   Component Value Date    EGFR 33 12/05/2024    EGFR 29 12/04/2024    EGFR 27 12/03/2024    CREATININE 1.97 (H) 12/05/2024    CREATININE 2.19 (H) 12/04/2024    CREATININE 2.35 (H) 12/03/2024   Biopsy proven minimal change disease developed about 6 weeks ago. Had gained about 40 lbs of water weight. Temporary dialysis catheter placed for 2 weeks, removed about 1 month ago.   On prednisone taper. Currently on 2.5 mg.   Follows with Dr. Kel Isaac at [facility] and recently saw Dr Vazquez with [facility]   Unclear baseline at this point.    Discussed with nephrology.  Patient stable for discharge, nephrology recommended continuing home torsemide 100 mg daily and follow-up with outpatient nephrologist.

## 2024-12-05 NOTE — PLAN OF CARE
Problem: Potential for Falls  Goal: Patient will remain free of falls  Description: INTERVENTIONS:  - Educate patient/family on patient safety including physical limitations  - Instruct patient to call for assistance with activity   - Consult OT/PT to assist with strengthening/mobility   - Keep Call bell within reach  - Keep bed low and locked with side rails adjusted as appropriate  - Keep care items and personal belongings within reach  - Initiate and maintain comfort rounds  - Make Fall Risk Sign visible to staff  - Offer Toileting every  Hours, in advance of need  - Initiate/Maintain alarm  - Obtain necessary fall risk management equipment:   - Apply yellow socks and bracelet for high fall risk patients  - Consider moving patient to room near nurses station  Outcome: Progressing     Problem: PAIN - ADULT  Goal: Verbalizes/displays adequate comfort level or baseline comfort level  Description: Interventions:  - Encourage patient to monitor pain and request assistance  - Assess pain using appropriate pain scale  - Administer analgesics based on type and severity of pain and evaluate response  - Implement non-pharmacological measures as appropriate and evaluate response  - Consider cultural and social influences on pain and pain management  - Notify physician/advanced practitioner if interventions unsuccessful or patient reports new pain  Outcome: Progressing     Problem: INFECTION - ADULT  Goal: Absence or prevention of progression during hospitalization  Description: INTERVENTIONS:  - Assess and monitor for signs and symptoms of infection  - Monitor lab/diagnostic results  - Monitor all insertion sites, i.e. indwelling lines, tubes, and drains  - Monitor endotracheal if appropriate and nasal secretions for changes in amount and color  - [place] appropriate cooling/warming therapies per order  - Administer medications as ordered  - Instruct and encourage patient and family to use good hand hygiene technique  -  Identify and instruct in appropriate isolation precautions for identified infection/condition  Outcome: Progressing  Goal: Absence of fever/infection during neutropenic period  Description: INTERVENTIONS:  - Monitor WBC    Outcome: Progressing     Problem: SAFETY ADULT  Goal: Patient will remain free of falls  Description: INTERVENTIONS:  - Educate patient/family on patient safety including physical limitations  - Instruct patient to call for assistance with activity   - Consult OT/PT to assist with strengthening/mobility   - Keep Call bell within reach  - Keep bed low and locked with side rails adjusted as appropriate  - Keep care items and personal belongings within reach  - Initiate and maintain comfort rounds  - Make Fall Risk Sign visible to staff  - Offer Toileting every  Hours, in advance of need  - Initiate/Maintain alarm  - Obtain necessary fall risk management equipment:   - Apply yellow socks and bracelet for high fall risk patients  - Consider moving patient to room near nurses station  Outcome: Progressing  Goal: Maintain or return to baseline ADL function  Description: INTERVENTIONS:  -  Assess patient's ability to carry out ADLs; assess patient's baseline for ADL function and identify physical deficits which impact ability to perform ADLs (bathing, care of mouth/teeth, toileting, grooming, dressing, etc.)  - Assess/evaluate cause of self-care deficits   - Assess range of motion  - Assess patient's mobility; develop plan if impaired  - Assess patient's need for assistive devices and provide as appropriate  - Encourage maximum independence but intervene and supervise when necessary  - Involve family in performance of ADLs  - Assess for home care needs following discharge   - Consider OT consult to assist with ADL evaluation and planning for discharge  - Provide patient education as appropriate  Outcome: Progressing  Goal: Maintains/Returns to pre admission functional level  Description:  INTERVENTIONS:  - Perform AM-PAC 6 Click Basic Mobility/ Daily Activity assessment daily.  - Set and communicate daily mobility goal to care team and patient/family/caregiver.   - Collaborate with rehabilitation services on mobility goals if consulted  - Perform Range of Motion  times a day.  - Reposition patient every  hours.  - Dangle patient  times a day  - Stand patient  times a day  - Ambulate patient  times a day  - Out of bed to chair  times a day   - Out of bed for meals times a day  - Out of bed for toileting  - Record patient progress and toleration of activity level   Outcome: Progressing     Problem: DISCHARGE PLANNING  Goal: Discharge to home or other facility with appropriate resources  Description: INTERVENTIONS:  - Identify barriers to discharge w/patient and caregiver  - Arrange for needed discharge resources and transportation as appropriate  - Identify discharge learning needs (meds, wound care, etc.)  - Arrange for interpretive services to assist at discharge as needed  - Refer to Case Management Department for coordinating discharge planning if the patient needs post-hospital services based on physician/advanced practitioner order or complex needs related to functional status, cognitive ability, or social support system  Outcome: Progressing     Problem: Knowledge Deficit  Goal: Patient/family/caregiver demonstrates understanding of disease process, treatment plan, medications, and discharge instructions  Description: Complete learning assessment and assess knowledge base.  Interventions:  - Provide teaching at level of understanding  - Provide teaching via preferred learning methods  Outcome: Progressing     Problem: Nutrition/Hydration-ADULT  Goal: Nutrient/Hydration intake appropriate for improving, restoring or maintaining nutritional needs  Description: Monitor and assess patient's nutrition/hydration status for malnutrition. Collaborate with interdisciplinary team and initiate plan and  interventions as ordered.  Monitor patient's weight and dietary intake as ordered or per policy. Utilize nutrition screening tool and intervene as necessary. Determine patient's food preferences and provide high-protein, high-caloric foods as appropriate.     INTERVENTIONS:  - Monitor oral intake, urinary output, labs, and treatment plans  - Assess nutrition and hydration status and recommend course of action  - Evaluate amount of meals eaten  - Assist patient with eating if necessary   - Allow adequate time for meals  - Recommend/ encourage appropriate diets, oral nutritional supplements, and vitamin/mineral supplements  - Order, calculate, and assess calorie counts as needed  - Recommend, monitor, and adjust tube feedings and TPN/PPN based on assessed needs  - Assess need for intravenous fluids  - Provide specific nutrition/hydration education as appropriate  - Include patient/family/caregiver in decisions related to nutrition  Outcome: Progressing

## 2024-12-05 NOTE — ASSESSMENT & PLAN NOTE
SOB with deep breaths x 1 month   Elevated Ddimer. Unable to have contast due to GFR  BNP WNL   CXR without obvious infiltrate. Denies fevers, chills, cough or congestion.   Echo with LVEF 55% diastolic dysfunction 1 no change compared to last echo.  Patient comfortably at room air in no distress.    VQ scan showed low probability of PE.  Diminished ventilation with preserved perfusion of the left lung base, findings could be related to developing airspace disease or mucous plugging.  Patient does not have any symptoms currently.  Recommended him to follow-up with PCP if he has worsening symptoms a CT of the chest can be considered.

## 2024-12-05 NOTE — ASSESSMENT & PLAN NOTE
Due to biopsy-proven minimal-change disease   initially on IV steroids for induction.  Discharged from [facility] on October 21 on prednisone 60 mg daily.  Now followed by Dr. Vazquez for management in addition to Dr. Kel Isaac at [facility]  On prednisone taper currently 2.5 mg daily which started on 12/3/2024  Still had 6.2 g proteinuria on UPC ratio yesterday  Continue losartan 25 mg daily  Likely starting Rituxan as an outpatient

## 2024-12-05 NOTE — DISCHARGE SUMMARY
Discharge Summary - Hospitalist   Name: Angel Escoto 69 y.o. male I MRN: [id number]  Unit/Bed#: -01 SDU I Date of Admission: 12/3/2024   Date of Service: 12/5/2024 I Hospital Day: 0     Assessment & Plan  Acute deep vein thrombosis (DVT) of right lower extremity (HCC)  Right lower legs painful at calf, erythema and increased swelling   Ddimer elevated   Venous duplex   Right lower limb   Evaluation shows acute non-occlusive thrombosis in the popliteal vein, posterior tibial veins, and peroneal veins.  No evidence of superficial thrombophlebitis noted.  Doppler evaluation shows a normal response to augmentation maneuvers.  Popliteal, posterior tibial and anterior tibial arterial Doppler waveforms are triphasic.  Left lower limb negative   Patient has risk factors as nephrotic syndrome with hypercoagulable state and mantle cell lymphoma.  Started on Eliquis 10 mg twice daily for 7 days, will continue with 5 mg twice daily afterwards.  Follow-up with hematology oncology    SOB (shortness of breath)  SOB with deep breaths x 1 month   Elevated Ddimer. Unable to have contast due to GFR  BNP WNL   CXR without obvious infiltrate. Denies fevers, chills, cough or congestion.   Echo with LVEF 55% diastolic dysfunction 1 no change compared to last echo.  Patient comfortably at room air in no distress.    VQ scan showed low probability of PE.  Diminished ventilation with preserved perfusion of the left lung base, findings could be related to developing airspace disease or mucous plugging.  Patient does not have any symptoms currently.  Recommended him to follow-up with PCP if he has worsening symptoms a CT of the chest can be considered.    CKD (chronic kidney disease) stage 4, GFR 15-29 ml/min (Formerly [facility])  Lab Results   Component Value Date    EGFR 33 12/05/2024    EGFR 29 12/04/2024    EGFR 27 12/03/2024    CREATININE 1.97 (H) 12/05/2024    CREATININE 2.19 (H) 12/04/2024    CREATININE 2.35 (H) 12/03/2024   Biopsy proven  minimal change disease developed about 6 weeks ago. Had gained about 40 lbs of water weight. Temporary dialysis catheter placed for 2 weeks, removed about 1 month ago.   On prednisone taper. Currently on 2.5 mg.   Follows with Dr. Kel Isaac at [facility] and recently saw Dr Vazquez with [facility]   Unclear baseline at this point.    Discussed with nephrology.  Patient stable for discharge, nephrology recommended continuing home torsemide 100 mg daily and follow-up with outpatient nephrologist.  Anemia  Hg 9.0   Had been 10.5 about 3 weeks ago.   Here with DVT and worsening creatinine   Denies hematuria, hemoptysis or known melena   Patient on heparin drip due to confirmed DVT. Monitor H&H.   Transfuse < 7  Vitamin B12, folate normal.  Iron panel consistent with anemia of chronic disease.  Hypothyroid  Continue levothyroxine   TSH 4.9 slightly elevated however patient would prefer to follow-up with PCP and have a repeat TSH in a few weeks.  Mantle cell lymphoma (HCC)  Recent diagnosis. Following with [facility]. Scheduled to start Rituxan on 12/10.   Nephrotic syndrome    Minimal change disease    Acute kidney injury (HCC)    Other proteinuria    Chronic kidney disease  Lab Results   Component Value Date    EGFR 33 12/05/2024    EGFR 29 12/04/2024    EGFR 27 12/03/2024    CREATININE 1.97 (H) 12/05/2024    CREATININE 2.19 (H) 12/04/2024    CREATININE 2.35 (H) 12/03/2024     Hyperphosphatemia    Acute on chronic renal insufficiency  Lab Results   Component Value Date    EGFR 33 12/05/2024    EGFR 29 12/04/2024    EGFR 27 12/03/2024    CREATININE 1.97 (H) 12/05/2024    CREATININE 2.19 (H) 12/04/2024    CREATININE 2.35 (H) 12/03/2024        Medical Problems       Resolved Problems  Date Reviewed: 11/13/2024   None       Discharging Physician / Practitioner: Magdalene Silver MD  PCP: Debra Crowe PA-C  Admission Date:   Admission Orders (From admission, onward)       Ordered        12/03/24 1906  Place in  Observation  Once                          Discharge Date: 12/05/24    Consultations During Hospital Stay:  Nephrology, hematology oncology    Procedures Performed:   None    Significant Findings / Test Results:   NM lung ventilation / perfusion  Result Date: 12/4/2024  Impression: The probability for pulmonary embolus is low. Etiology for diminished ventilation with preservation of perfusion at the left lung base is unclear. Findings could be related to developing airspace disease or mucous plugging. If clinically indicated, consider noncontrast CT of the chest for further assessment in this patient with history of nephrotic syndrome/SALLIE. Workstation performed: [id number]     XR chest 1 view portable  Result Date: 12/4/2024  Impression: No acute cardiopulmonary disease. Workstation performed: [id number]     XR chest 1 view portable  Result Date: 12/4/2024  Impression No acute cardiopulmonary disease. Workstation performed: [id number]         Incidental Findings:   none     Test Results Pending at Discharge (will require follow up):   none     Outpatient Tests Requested:  none    Complications:  none    Reason for Admission: Right leg swelling    Hospital Course:   Angel Escoto is a 69 y.o. male patient with history of hypothyroidism, mantle cell lymphoma, anemia, CKD who originally presented to the hospital on 12/3/2024 due to right lower leg swelling, redness with pain.  Lower extremity ultrasound on admission showed right DVT.  Patient was admitted and started on heparin drip, eventually he was switched to Eliquis.    Patient recently with minimal changes disease and nephrotic syndrome, required temporary dialysis for approximately 2 weeks currently on prednisone taper.  Slightly elevated creatinine on presentation, unclear baseline.  Nephrology was consulted, patient received one-time dose of IV diuretics, creatinine improved.    Patient stable for discharge.  Follow-up with outpatient nephrology and  hematology oncology.      Please see above list of diagnoses and related plan for additional information.     Condition at Discharge: good    Discharge Day Visit / Exam:   Subjective: Right leg edema improved  Vitals: Blood Pressure: 117/65 (12/05/24 1057)  Pulse: 77 (12/05/24 1057)  Temperature: 98.1 °F (36.7 °C) (12/05/24 1057)  Temp Source: Oral (12/05/24 1057)  Respirations: 20 (12/05/24 1057)  Height: 6' (182.9 cm) (12/04/24 0706)  Weight - Scale: 90.3 kg (199 lb) (12/04/24 0706)  SpO2: 94 % (12/05/24 1057)  Physical Exam  Vitals and nursing note reviewed.   Constitutional:       General: He is not in acute distress.     Appearance: He is not diaphoretic.   HENT:      Head: Normocephalic.   Eyes:      General:         Right eye: No discharge.         Left eye: No discharge.   Cardiovascular:      Rate and Rhythm: Normal rate and regular rhythm.      Heart sounds: No murmur heard.  Pulmonary:      Effort: Pulmonary effort is normal. No respiratory distress.      Breath sounds: Normal breath sounds. No wheezing, rhonchi or rales.   Abdominal:      General: There is no distension.      Palpations: Abdomen is soft.      Tenderness: There is no abdominal tenderness. There is no guarding or rebound.   Musculoskeletal:      Cervical back: Normal range of motion.      Right lower leg: Edema (R>L) present.      Left lower leg: Edema present.   Skin:     General: Skin is warm.   Neurological:      Mental Status: He is alert and oriented to person, place, and time.   Psychiatric:         Mood and Affect: Mood normal.         Behavior: Behavior normal.         Thought Content: Thought content normal.         Judgment: Judgment normal.          Discussion with Family: Attempted to update  (wife) via phone. Left voicemail.     Discharge instructions/Information to patient and family:   See after visit summary for information provided to patient and family.      Provisions for Follow-Up Care:  See after visit  summary for information related to follow-up care and any pertinent home health orders.      Mobility at time of Discharge:   Basic Mobility Inpatient Raw Score: 24  JH-HLM Goal: 8: Walk 250 feet or more  JH-HLM Achieved: 8: Walk 250 feet ot more  HLM Goal achieved. Continue to encourage appropriate mobility.     Disposition:   Home    Planned Readmission: no    Discharge Medications:  See after visit summary for reconciled discharge medications provided to patient and/or family.      Administrative Statements   Discharge Statement:  I have spent a total time of 40 minutes in caring for this patient on the day of the visit/encounter. .    **Please Note: This note may have been constructed using a voice recognition system**

## 2024-12-06 LAB
ATRIAL RATE: 67 BPM
P AXIS: 73 DEGREES
PR INTERVAL: 216 MS
QRS AXIS: 52 DEGREES
QRSD INTERVAL: 92 MS
QT INTERVAL: 422 MS
QTC INTERVAL: 445 MS
T WAVE AXIS: 64 DEGREES
VENTRICULAR RATE: 67 BPM

## 2024-12-06 PROCEDURE — 93010 ELECTROCARDIOGRAM REPORT: CPT | Performed by: INTERNAL MEDICINE

## 2024-12-07 ENCOUNTER — APPOINTMENT (EMERGENCY)
Dept: CT IMAGING | Facility: HOSPITAL | Age: 69
DRG: 176 | End: 2024-12-07
Payer: COMMERCIAL

## 2024-12-07 ENCOUNTER — HOSPITAL ENCOUNTER (INPATIENT)
Facility: HOSPITAL | Age: 69
LOS: 2 days | Discharge: HOME | DRG: 176 | End: 2024-12-09
Attending: EMERGENCY MEDICINE | Admitting: HOSPITALIST
Payer: COMMERCIAL

## 2024-12-07 DIAGNOSIS — E87.70 HYPERVOLEMIA: ICD-10-CM

## 2024-12-07 DIAGNOSIS — N17.9 AKI (ACUTE KIDNEY INJURY) (HCC): ICD-10-CM

## 2024-12-07 DIAGNOSIS — G44.011 INTRACTABLE EPISODIC CLUSTER HEADACHE: ICD-10-CM

## 2024-12-07 DIAGNOSIS — I26.99 PULMONARY EMBOLISM (HCC): Primary | ICD-10-CM

## 2024-12-07 DIAGNOSIS — C83.10 MANTLE CELL LYMPHOMA, UNSPECIFIED BODY REGION (HCC): ICD-10-CM

## 2024-12-07 LAB
2HR DELTA HS TROPONIN: -2 NG/L
4HR DELTA HS TROPONIN: -2 NG/L
ALBUMIN SERPL BCG-MCNC: 2.6 G/DL (ref 3.5–5)
ALP SERPL-CCNC: 67 U/L (ref 34–104)
ALT SERPL W P-5'-P-CCNC: 12 U/L (ref 7–52)
ANION GAP SERPL CALCULATED.3IONS-SCNC: 4 MMOL/L (ref 4–13)
APTT PPP: 31 SECONDS (ref 23–34)
APTT PPP: 98 SECONDS (ref 23–34)
AST SERPL W P-5'-P-CCNC: 18 U/L (ref 13–39)
ATRIAL RATE: 75 BPM
BACTERIA UR QL AUTO: ABNORMAL /HPF
BASOPHILS # BLD AUTO: 0.05 THOUSANDS/ÂΜL (ref 0–0.1)
BASOPHILS NFR BLD AUTO: 1 % (ref 0–1)
BILIRUB SERPL-MCNC: 0.38 MG/DL (ref 0.2–1)
BILIRUB UR QL STRIP: NEGATIVE
BNP SERPL-MCNC: 42 PG/ML (ref 0–100)
BUN SERPL-MCNC: 36 MG/DL (ref 5–25)
CALCIUM ALBUM COR SERPL-MCNC: 9.3 MG/DL (ref 8.3–10.1)
CALCIUM SERPL-MCNC: 8.2 MG/DL (ref 8.4–10.2)
CARDIAC TROPONIN I PNL SERPL HS: 11 NG/L (ref ?–50)
CARDIAC TROPONIN I PNL SERPL HS: 11 NG/L (ref ?–50)
CARDIAC TROPONIN I PNL SERPL HS: 13 NG/L (ref ?–50)
CHLORIDE SERPL-SCNC: 106 MMOL/L (ref 96–108)
CLARITY UR: CLEAR
CO2 SERPL-SCNC: 30 MMOL/L (ref 21–32)
COLOR UR: ABNORMAL
CREAT SERPL-MCNC: 2.21 MG/DL (ref 0.6–1.3)
D DIMER PPP FEU-MCNC: 3.54 UG/ML FEU
EOSINOPHIL # BLD AUTO: 0.27 THOUSAND/ÂΜL (ref 0–0.61)
EOSINOPHIL NFR BLD AUTO: 3 % (ref 0–6)
ERYTHROCYTE [DISTWIDTH] IN BLOOD BY AUTOMATED COUNT: 13.6 % (ref 11.6–15.1)
GFR SERPL CREATININE-BSD FRML MDRD: 29 ML/MIN/1.73SQ M
GLUCOSE SERPL-MCNC: 111 MG/DL (ref 65–140)
GLUCOSE UR STRIP-MCNC: NEGATIVE MG/DL
HCT VFR BLD AUTO: 27.9 % (ref 36.5–49.3)
HGB BLD-MCNC: 8.7 G/DL (ref 12–17)
HGB UR QL STRIP.AUTO: ABNORMAL
IMM GRANULOCYTES # BLD AUTO: 0.11 THOUSAND/UL (ref 0–0.2)
IMM GRANULOCYTES NFR BLD AUTO: 1 % (ref 0–2)
INR PPP: 1.3 (ref 0.85–1.19)
KETONES UR STRIP-MCNC: NEGATIVE MG/DL
LEUKOCYTE ESTERASE UR QL STRIP: NEGATIVE
LYMPHOCYTES # BLD AUTO: 1.75 THOUSANDS/ÂΜL (ref 0.6–4.47)
LYMPHOCYTES NFR BLD AUTO: 22 % (ref 14–44)
MCH RBC QN AUTO: 31.9 PG (ref 26.8–34.3)
MCHC RBC AUTO-ENTMCNC: 31.2 G/DL (ref 31.4–37.4)
MCV RBC AUTO: 102 FL (ref 82–98)
MONOCYTES # BLD AUTO: 0.59 THOUSAND/ÂΜL (ref 0.17–1.22)
MONOCYTES NFR BLD AUTO: 7 % (ref 4–12)
NEUTROPHILS # BLD AUTO: 5.25 THOUSANDS/ÂΜL (ref 1.85–7.62)
NEUTS SEG NFR BLD AUTO: 66 % (ref 43–75)
NITRITE UR QL STRIP: NEGATIVE
NON-SQ EPI CELLS URNS QL MICRO: ABNORMAL /HPF
NRBC BLD AUTO-RTO: 0 /100 WBCS
P AXIS: 81 DEGREES
PH UR STRIP.AUTO: 7 [PH]
PLATELET # BLD AUTO: 244 THOUSANDS/UL (ref 149–390)
PMV BLD AUTO: 8.4 FL (ref 8.9–12.7)
POTASSIUM SERPL-SCNC: 3.9 MMOL/L (ref 3.5–5.3)
PR INTERVAL: 210 MS
PROT SERPL-MCNC: 5.2 G/DL (ref 6.4–8.4)
PROT UR STRIP-MCNC: ABNORMAL MG/DL
PROTHROMBIN TIME: 16.7 SECONDS (ref 12.3–15)
QRS AXIS: 62 DEGREES
QRSD INTERVAL: 88 MS
QT INTERVAL: 388 MS
QTC INTERVAL: 433 MS
RBC # BLD AUTO: 2.73 MILLION/UL (ref 3.88–5.62)
RBC #/AREA URNS AUTO: ABNORMAL /HPF
SODIUM SERPL-SCNC: 140 MMOL/L (ref 135–147)
SP GR UR STRIP.AUTO: 1.01 (ref 1–1.03)
T WAVE AXIS: 59 DEGREES
UROBILINOGEN UR STRIP-ACNC: <2 MG/DL
VENTRICULAR RATE: 75 BPM
WBC # BLD AUTO: 8.02 THOUSAND/UL (ref 4.31–10.16)
WBC #/AREA URNS AUTO: ABNORMAL /HPF

## 2024-12-07 PROCEDURE — 85025 COMPLETE CBC W/AUTO DIFF WBC: CPT | Performed by: EMERGENCY MEDICINE

## 2024-12-07 PROCEDURE — 99285 EMERGENCY DEPT VISIT HI MDM: CPT

## 2024-12-07 PROCEDURE — 81001 URINALYSIS AUTO W/SCOPE: CPT | Performed by: EMERGENCY MEDICINE

## 2024-12-07 PROCEDURE — 84484 ASSAY OF TROPONIN QUANT: CPT | Performed by: EMERGENCY MEDICINE

## 2024-12-07 PROCEDURE — 85379 FIBRIN DEGRADATION QUANT: CPT | Performed by: EMERGENCY MEDICINE

## 2024-12-07 PROCEDURE — 85730 THROMBOPLASTIN TIME PARTIAL: CPT | Performed by: INTERNAL MEDICINE

## 2024-12-07 PROCEDURE — 36415 COLL VENOUS BLD VENIPUNCTURE: CPT | Performed by: EMERGENCY MEDICINE

## 2024-12-07 PROCEDURE — 99285 EMERGENCY DEPT VISIT HI MDM: CPT | Performed by: EMERGENCY MEDICINE

## 2024-12-07 PROCEDURE — 85730 THROMBOPLASTIN TIME PARTIAL: CPT | Performed by: EMERGENCY MEDICINE

## 2024-12-07 PROCEDURE — 85610 PROTHROMBIN TIME: CPT | Performed by: EMERGENCY MEDICINE

## 2024-12-07 PROCEDURE — 74177 CT ABD & PELVIS W/CONTRAST: CPT

## 2024-12-07 PROCEDURE — 80053 COMPREHEN METABOLIC PANEL: CPT | Performed by: EMERGENCY MEDICINE

## 2024-12-07 PROCEDURE — 93010 ELECTROCARDIOGRAM REPORT: CPT | Performed by: INTERNAL MEDICINE

## 2024-12-07 PROCEDURE — 96360 HYDRATION IV INFUSION INIT: CPT

## 2024-12-07 PROCEDURE — 71275 CT ANGIOGRAPHY CHEST: CPT

## 2024-12-07 PROCEDURE — 93005 ELECTROCARDIOGRAM TRACING: CPT

## 2024-12-07 PROCEDURE — 83880 ASSAY OF NATRIURETIC PEPTIDE: CPT | Performed by: INTERNAL MEDICINE

## 2024-12-07 PROCEDURE — 94760 N-INVAS EAR/PLS OXIMETRY 1: CPT

## 2024-12-07 PROCEDURE — 99255 IP/OBS CONSLTJ NEW/EST HI 80: CPT | Performed by: INTERNAL MEDICINE

## 2024-12-07 PROCEDURE — 99223 1ST HOSP IP/OBS HIGH 75: CPT | Performed by: INTERNAL MEDICINE

## 2024-12-07 PROCEDURE — 96361 HYDRATE IV INFUSION ADD-ON: CPT

## 2024-12-07 PROCEDURE — 94664 DEMO&/EVAL PT USE INHALER: CPT

## 2024-12-07 RX ORDER — DULOXETIN HYDROCHLORIDE 20 MG/1
20 CAPSULE, DELAYED RELEASE ORAL DAILY
Status: DISCONTINUED | OUTPATIENT
Start: 2024-12-07 | End: 2024-12-09 | Stop reason: HOSPADM

## 2024-12-07 RX ORDER — HEPARIN SODIUM 1000 [USP'U]/ML
3600 INJECTION, SOLUTION INTRAVENOUS; SUBCUTANEOUS EVERY 6 HOURS PRN
Status: DISCONTINUED | OUTPATIENT
Start: 2024-12-07 | End: 2024-12-07

## 2024-12-07 RX ORDER — ALBUTEROL SULFATE 0.83 MG/ML
2.5 SOLUTION RESPIRATORY (INHALATION) EVERY 4 HOURS PRN
Status: DISCONTINUED | OUTPATIENT
Start: 2024-12-07 | End: 2024-12-09 | Stop reason: HOSPADM

## 2024-12-07 RX ORDER — HEPARIN SODIUM 10000 [USP'U]/100ML
3-30 INJECTION, SOLUTION INTRAVENOUS
Status: DISCONTINUED | OUTPATIENT
Start: 2024-12-07 | End: 2024-12-09

## 2024-12-07 RX ORDER — SULFAMETHOXAZOLE AND TRIMETHOPRIM 400; 80 MG/1; MG/1
1 TABLET ORAL DAILY
Status: DISCONTINUED | OUTPATIENT
Start: 2024-12-08 | End: 2024-12-09 | Stop reason: HOSPADM

## 2024-12-07 RX ORDER — ONDANSETRON 2 MG/ML
4 INJECTION INTRAMUSCULAR; INTRAVENOUS EVERY 6 HOURS PRN
Status: DISCONTINUED | OUTPATIENT
Start: 2024-12-07 | End: 2024-12-09 | Stop reason: HOSPADM

## 2024-12-07 RX ORDER — DOCUSATE SODIUM 100 MG/1
100 CAPSULE, LIQUID FILLED ORAL 2 TIMES DAILY
Status: DISCONTINUED | OUTPATIENT
Start: 2024-12-07 | End: 2024-12-09 | Stop reason: HOSPADM

## 2024-12-07 RX ORDER — ACETAMINOPHEN 325 MG/1
650 TABLET ORAL EVERY 6 HOURS PRN
Status: DISCONTINUED | OUTPATIENT
Start: 2024-12-07 | End: 2024-12-09 | Stop reason: HOSPADM

## 2024-12-07 RX ORDER — LEVOTHYROXINE SODIUM 100 UG/1
100 TABLET ORAL DAILY
Status: DISCONTINUED | OUTPATIENT
Start: 2024-12-08 | End: 2024-12-09 | Stop reason: HOSPADM

## 2024-12-07 RX ORDER — SODIUM CHLORIDE, SODIUM GLUCONATE, SODIUM ACETATE, POTASSIUM CHLORIDE, MAGNESIUM CHLORIDE, SODIUM PHOSPHATE, DIBASIC, AND POTASSIUM PHOSPHATE .53; .5; .37; .037; .03; .012; .00082 G/100ML; G/100ML; G/100ML; G/100ML; G/100ML; G/100ML; G/100ML
250 INJECTION, SOLUTION INTRAVENOUS ONCE
Status: DISCONTINUED | OUTPATIENT
Start: 2024-12-07 | End: 2024-12-07

## 2024-12-07 RX ORDER — HEPARIN SODIUM 10000 [USP'U]/100ML
3-30 INJECTION, SOLUTION INTRAVENOUS
Status: DISCONTINUED | OUTPATIENT
Start: 2024-12-07 | End: 2024-12-07 | Stop reason: CLARIF

## 2024-12-07 RX ORDER — ACETAMINOPHEN 325 MG/1
975 TABLET ORAL ONCE
Status: COMPLETED | OUTPATIENT
Start: 2024-12-07 | End: 2024-12-07

## 2024-12-07 RX ORDER — SENNOSIDES 8.6 MG
8.6 TABLET ORAL
Status: DISCONTINUED | OUTPATIENT
Start: 2024-12-07 | End: 2024-12-09 | Stop reason: HOSPADM

## 2024-12-07 RX ORDER — TORSEMIDE 20 MG/1
100 TABLET ORAL DAILY
Status: DISCONTINUED | OUTPATIENT
Start: 2024-12-08 | End: 2024-12-08

## 2024-12-07 RX ORDER — TRAMADOL HYDROCHLORIDE 50 MG/1
50 TABLET ORAL EVERY 6 HOURS PRN
Status: DISCONTINUED | OUTPATIENT
Start: 2024-12-07 | End: 2024-12-09 | Stop reason: HOSPADM

## 2024-12-07 RX ORDER — TADALAFIL 5 MG/1
5 TABLET ORAL DAILY
Status: DISCONTINUED | OUTPATIENT
Start: 2024-12-07 | End: 2024-12-09 | Stop reason: HOSPADM

## 2024-12-07 RX ORDER — LOSARTAN POTASSIUM 25 MG/1
25 TABLET ORAL DAILY
Status: DISCONTINUED | OUTPATIENT
Start: 2024-12-08 | End: 2024-12-09 | Stop reason: HOSPADM

## 2024-12-07 RX ORDER — HEPARIN SODIUM 1000 [USP'U]/ML
7200 INJECTION, SOLUTION INTRAVENOUS; SUBCUTANEOUS EVERY 6 HOURS PRN
Status: DISCONTINUED | OUTPATIENT
Start: 2024-12-07 | End: 2024-12-09 | Stop reason: ALTCHOICE

## 2024-12-07 RX ORDER — HEPARIN SODIUM 10000 [USP'U]/100ML
3-30 INJECTION, SOLUTION INTRAVENOUS
Status: DISCONTINUED | OUTPATIENT
Start: 2024-12-07 | End: 2024-12-07

## 2024-12-07 RX ORDER — FAMOTIDINE 20 MG/1
20 TABLET, FILM COATED ORAL DAILY
Status: DISCONTINUED | OUTPATIENT
Start: 2024-12-08 | End: 2024-12-09 | Stop reason: HOSPADM

## 2024-12-07 RX ORDER — HEPARIN SODIUM 1000 [USP'U]/ML
3600 INJECTION, SOLUTION INTRAVENOUS; SUBCUTANEOUS EVERY 6 HOURS PRN
Status: DISCONTINUED | OUTPATIENT
Start: 2024-12-07 | End: 2024-12-09 | Stop reason: ALTCHOICE

## 2024-12-07 RX ORDER — HEPARIN SODIUM 1000 [USP'U]/ML
7200 INJECTION, SOLUTION INTRAVENOUS; SUBCUTANEOUS EVERY 6 HOURS PRN
Status: DISCONTINUED | OUTPATIENT
Start: 2024-12-07 | End: 2024-12-07

## 2024-12-07 RX ORDER — PREDNISONE 1 MG/1
2.5 TABLET ORAL DAILY
Status: DISCONTINUED | OUTPATIENT
Start: 2024-12-08 | End: 2024-12-09 | Stop reason: HOSPADM

## 2024-12-07 RX ADMIN — ACETAMINOPHEN 650 MG: 325 TABLET, FILM COATED ORAL at 19:35

## 2024-12-07 RX ADMIN — TADALAFIL 5 MG: 5 TABLET ORAL at 17:20

## 2024-12-07 RX ADMIN — HEPARIN SODIUM 18 UNITS/KG/HR: 10000 INJECTION, SOLUTION INTRAVENOUS at 15:15

## 2024-12-07 RX ADMIN — SODIUM CHLORIDE 500 ML: 0.9 INJECTION, SOLUTION INTRAVENOUS at 14:47

## 2024-12-07 RX ADMIN — SODIUM CHLORIDE 500 ML: 0.9 INJECTION, SOLUTION INTRAVENOUS at 10:05

## 2024-12-07 RX ADMIN — DOCUSATE SODIUM 100 MG: 100 CAPSULE, LIQUID FILLED ORAL at 17:20

## 2024-12-07 RX ADMIN — SENNOSIDES 8.6 MG: 8.6 TABLET, FILM COATED ORAL at 22:19

## 2024-12-07 RX ADMIN — ACETAMINOPHEN 975 MG: 325 TABLET, FILM COATED ORAL at 10:21

## 2024-12-07 RX ADMIN — IOHEXOL 100 ML: 350 INJECTION, SOLUTION INTRAVENOUS at 12:35

## 2024-12-07 NOTE — PLAN OF CARE
Problem: PAIN - ADULT  Goal: Verbalizes/displays adequate comfort level or baseline comfort level  Description: Interventions:  - Encourage patient to monitor pain and request assistance  - Assess pain using appropriate pain scale  - Administer analgesics based on type and severity of pain and evaluate response  - Implement non-pharmacological measures as appropriate and evaluate response  - Consider cultural and social influences on pain and pain management  - Notify physician/advanced practitioner if interventions unsuccessful or patient reports new pain  12/7/2024 1536 by Matt Gonzales RN  Outcome: Progressing  12/7/2024 1536 by Matt Gonzales RN  Outcome: Progressing     Problem: INFECTION - ADULT  Goal: Absence or prevention of progression during hospitalization  Description: INTERVENTIONS:  - Assess and monitor for signs and symptoms of infection  - Monitor lab/diagnostic results  - Monitor all insertion sites, i.e. indwelling lines, tubes, and drains  - Monitor endotracheal if appropriate and nasal secretions for changes in amount and color  - [place] appropriate cooling/warming therapies per order  - Administer medications as ordered  - Instruct and encourage patient and family to use good hand hygiene technique  - Identify and instruct in appropriate isolation precautions for identified infection/condition  12/7/2024 1536 by Matt Gonzales RN  Outcome: Progressing  12/7/2024 1536 by Matt Gonzales RN  Outcome: Progressing  Goal: Absence of fever/infection during neutropenic period  Description: INTERVENTIONS:  - Monitor WBC    12/7/2024 1536 by Matt Gonzales RN  Outcome: Progressing  12/7/2024 1536 by Matt Gonzales RN  Outcome: Progressing     Problem: SAFETY ADULT  Goal: Patient will remain free of falls  Description: INTERVENTIONS:  - Educate patient/family on patient safety including physical limitations  - Instruct patient to call for assistance with activity   - Consult OT/PT to  assist with strengthening/mobility   - Keep Call bell within reach  - Keep bed low and locked with side rails adjusted as appropriate  - Keep care items and personal belongings within reach  - Initiate and maintain comfort rounds  - Make Fall Risk Sign visible to staff  - Offer Toileting every x Hours, in advance of need  - Initiate/Maintain xalarm  - Obtain necessary fall risk management equipment: xx  - Apply yellow socks and bracelet for high fall risk patients  - Consider moving patient to room near nurses station  12/7/2024 1536 by Matt Gonzales RN  Outcome: Progressing  12/7/2024 1536 by Matt Gonzales RN  Outcome: Progressing  Goal: Maintain or return to baseline ADL function  Description: INTERVENTIONS:  -  Assess patient's ability to carry out ADLs; assess patient's baseline for ADL function and identify physical deficits which impact ability to perform ADLs (bathing, care of mouth/teeth, toileting, grooming, dressing, etc.)  - Assess/evaluate cause of self-care deficits   - Assess range of motion  - Assess patient's mobility; develop plan if impaired  - Assess patient's need for assistive devices and provide as appropriate  - Encourage maximum independence but intervene and supervise when necessary  - Involve family in performance of ADLs  - Assess for home care needs following discharge   - Consider OT consult to assist with ADL evaluation and planning for discharge  - Provide patient education as appropriate  12/7/2024 1536 by Matt Gonzales RN  Outcome: Progressing  12/7/2024 1536 by Matt Gonzales RN  Outcome: Progressing  Goal: Maintains/Returns to pre admission functional level  Description: INTERVENTIONS:  - Perform AM-PAC 6 Click Basic Mobility/ Daily Activity assessment daily.  - Set and communicate daily mobility goal to care team and patient/family/caregiver.   - Collaborate with rehabilitation services on mobility goals if consulted  - Perform Range of Motion x times a day.  -  Reposition patient every x hours.  - Dangle patient x times a day  - Stand patient x times a day  - Ambulate patient x times a day  - Out of bed to chair x times a day   - Out of bed for meals x times a day  - Out of bed for toileting  - Record patient progress and toleration of activity level   12/7/2024 1536 by Matt Gonzales RN  Outcome: Progressing  12/7/2024 1536 by Matt Gonzales RN  Outcome: Progressing     Problem: DISCHARGE PLANNING  Goal: Discharge to home or other facility with appropriate resources  Description: INTERVENTIONS:  - Identify barriers to discharge w/patient and caregiver  - Arrange for needed discharge resources and transportation as appropriate  - Identify discharge learning needs (meds, wound care, etc.)  - Arrange for interpretive services to assist at discharge as needed  - Refer to Case Management Department for coordinating discharge planning if the patient needs post-hospital services based on physician/advanced practitioner order or complex needs related to functional status, cognitive ability, or social support system  12/7/2024 1536 by Matt Gonzales RN  Outcome: Progressing  12/7/2024 1536 by Matt Gonzales RN  Outcome: Progressing     Problem: Knowledge Deficit  Goal: Patient/family/caregiver demonstrates understanding of disease process, treatment plan, medications, and discharge instructions  Description: Complete learning assessment and assess knowledge base.  Interventions:  - Provide teaching at level of understanding  - Provide teaching via preferred learning methods  12/7/2024 1536 by Matt Gonzales RN  Outcome: Progressing  12/7/2024 1536 by Matt Gonzales RN  Outcome: Progressing

## 2024-12-07 NOTE — ASSESSMENT & PLAN NOTE
"Presented with pleuritic chest pain and shortness of breath  Admitted with acute pulmonary embolism  CT chest PE with contrast study showed-\"Acute segmental pulmonary embolus in the posterior basilar segment pulmonary artery of the right lower lobe. Overall clot burden is small.Measured RV/LV ratio is within normal limits at less than 0.9.Possible partially imaged deep venous thrombosis within the proximal left superficial femoral vein. Correlation with venous Dopplers of the lower extremities is advised.\"  Troponin and BNP are normal  Echocardiogram on 12/4 showed LVEF 55% with grade 1 diastolic dysfunction.   Patient had VQ scan on 12/3-showed low probability of PE. Diminished ventilation with preserved perfusion of the left lung base, findings could be related to developing airspace disease or mucous plugging.   Patient was taking loading dose of Eliquis 10 mg twice daily  ER ordered heparin drip.  Continue heparin drip  Pulmonology consult  Will repeat bilateral venous duplex lower extremity  Monitor vitals closely  "

## 2024-12-07 NOTE — ASSESSMENT & PLAN NOTE
Lab Results   Component Value Date    EGFR 29 12/07/2024    EGFR 33 12/05/2024    EGFR 29 12/04/2024    CREATININE 2.21 (H) 12/07/2024    CREATININE 1.97 (H) 12/05/2024    CREATININE 2.19 (H) 12/04/2024   Biopsy proven minimal change disease developed about 6 weeks ago. Had gained about 40 lbs of water weight. Temporary dialysis catheter placed for 2 weeks, removed about 1 month ago.   On prednisone taper. Currently on 2.5 mg.   Follows with Dr. Kel Isaac at [facility] and recently saw Dr Vazquez with [facility]   Unclear baseline at this point.    Will request nephrology consult since patient got IV contrast  Continue gentle IV fluids

## 2024-12-07 NOTE — ASSESSMENT & PLAN NOTE
Recent diagnosis. Following with [facility]. Scheduled to start Rituxan on 12/10.   Continue outpatient follow-up at [facility]

## 2024-12-07 NOTE — ASSESSMENT & PLAN NOTE
Patient diagnosed with DVT with recent admission from 12/3 to 12/5/24  Venous duplex on 12/3  Right lower limb Evaluation shows acute non-occlusive thrombosis in the popliteal vein, posterior tibial veins, and peroneal veins.  Was discharged on Eliquis 10 mg twice daily for 7 days, will continue with 5 mg twice daily afterwards.  Repeat venous duplex of bilateral lower extremity given CT finding of possible proximal left superficial femoral vein thrombosis  Continue on IV heparin drip

## 2024-12-07 NOTE — ED NOTES
Labs including BNP, d dimer, APTT, protime-INR added onto patient's labs.      Leatha Lopez RN  12/07/24 1014

## 2024-12-07 NOTE — ASSESSMENT & PLAN NOTE
Lab Results   Component Value Date    EGFR 29 12/07/2024    EGFR 33 12/05/2024    EGFR 29 12/04/2024    CREATININE 2.21 (H) 12/07/2024    CREATININE 1.97 (H) 12/05/2024    CREATININE 2.19 (H) 12/04/2024

## 2024-12-07 NOTE — ASSESSMENT & PLAN NOTE
Patient admitted with hemoglobin of 8.7  Discharged on 12/5 with hemoglobin of 8.6  Denies hematuria, hemoptysis or known melena   Transfuse < 7  Patient's previous iron panel consistent with anemia of chronic disease.

## 2024-12-07 NOTE — ASSESSMENT & PLAN NOTE
Patient has a new right sided pulmonary infarct due to small clot burden PE.  I reviewed the VQ scan and there does appear to be a defect in that area from his prior VQ scan.  That being said he has suggestion of a new clot in his left leg which was not there on Dopplers last admission.  Recommend continuing heparin drip and repeat Dopplers if he has new DVT we will need to consider this a failure of Eliquis otherwise resume his Eliquis restarting 10 mg daily for 7 days before decreasing to 5 mg twice daily.  Explained to patient and his wife he is high risk factor for PE given his recent hospitalization and likely nephrotic syndrome and cancer.

## 2024-12-07 NOTE — CONSULTS
Consultation - Pulmonology   Name: Angel Escoto 69 y.o. male I MRN: [id number]  Unit/Bed#: -01 I Date of Admission: 12/7/2024   Date of Service: 12/7/2024 I Hospital Day: 0   Inpatient consult to Pulmonology  Consult performed by: Olga Fernández DO  Consult ordered by: Antonio Nguyen MD        Physician Requesting Evaluation: Antonio Nguyen MD   Reason for Evaluation / Principal Problem: pe    Assessment & Plan  Acute pulmonary embolism (HCC)  Patient has a new right sided pulmonary infarct due to small clot burden PE.  I reviewed the VQ scan and there does appear to be a defect in that area from his prior VQ scan.  That being said he has suggestion of a new clot in his left leg which was not there on Dopplers last admission.  Recommend continuing heparin drip and repeat Dopplers if he has new DVT we will need to consider this a failure of Eliquis otherwise resume his Eliquis restarting 10 mg daily for 7 days before decreasing to 5 mg twice daily.  Explained to patient and his wife he is high risk factor for PE given his recent hospitalization and likely nephrotic syndrome and cancer.  Hypothyroid    Mantle cell lymphoma (HCC)    Acute deep vein thrombosis (DVT) of right lower extremity (HCC)    Anemia    CKD (chronic kidney disease) stage 4, GFR 15-29 ml/min ([facility])  Lab Results   Component Value Date    EGFR 29 12/07/2024    EGFR 33 12/05/2024    EGFR 29 12/04/2024    CREATININE 2.21 (H) 12/07/2024    CREATININE 1.97 (H) 12/05/2024    CREATININE 2.19 (H) 12/04/2024         History of Present Illness   Angel Escoto is a 69 y.o. male who presents with acute onset of pleuritic chest pain on the right.  Patient was recently discharged after diagnosis of DVT in his right leg comes back in after 3 days of Eliquis 10 mg twice daily with new pain.  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  He was placed on high-dose prednisone with improvement and he came off dialysis is due to start rituximab not only for this but also mantle cell lymphoma which is new recent diagnosis as well.    Review of Systems   Constitutional: Negative.    HENT: Negative.     Eyes: Negative.    Respiratory:  Negative for shortness of breath.    Cardiovascular:  Positive for chest pain.   Gastrointestinal: Negative.    Endocrine: Negative.    Genitourinary: Negative.    Allergic/Immunologic: Negative.    Neurological: Negative.    Hematological: Negative.    Psychiatric/Behavioral: Negative.         Historical Information   I have reviewed the patient's PMH, PSH, Social History, Family History, Meds, and Allergies  Historical Information   Past Medical History:   Diagnosis Date    Acute nephrotic syndrome     Disease of thyroid gland     DVT (deep venous thrombosis) (HCC)     Mantle cell lymphoma (HCC)      Past Surgical History:   Procedure Laterality Date    ABLATION OF DYSRHYTHMIC FOCUS      IR TUNNELED DIALYSIS CATHETER REMOVAL  11/13/2024     Social History     Tobacco Use    Smoking status: Never    Smokeless tobacco: Never   Vaping Use    Vaping status: Never Used   Substance and Sexual Activity    Alcohol use: Yes     Comment: rare    Drug use: Never    Sexual activity: Not on file     E-Cigarette/Vaping    E-Cigarette Use Never User      E-Cigarette/Vaping Substances    Nicotine No     THC No     CBD No     Flavoring No     Other No     Unknown No      Family history non-contributory  Occupational History: Noncontributory  Family History:non-contributory    Objective :  Temp:  [97.7 °F (36.5 °C)-98.4 °F (36.9 °C)] 98.4 °F (36.9 °C)  HR:  [65-79] 71  BP: (135-163)/(63-77) 143/68  Resp:  [11-20] 18  SpO2:  [97 %-100 %] 100 %  O2 Device: None (Room air)    Physical Exam  Constitutional:       Appearance: He is well-developed.   HENT:      Head: Normocephalic and atraumatic.   Eyes:       Pupils: Pupils are equal, round, and reactive to light.   Cardiovascular:      Rate and Rhythm: Normal rate and regular rhythm.      Heart sounds: No murmur heard.  Pulmonary:      Effort: Pulmonary effort is normal. No respiratory distress.      Breath sounds: Normal breath sounds. No wheezing or rales.   Abdominal:      Palpations: Abdomen is soft.   Musculoskeletal:      Cervical back: Normal range of motion and neck supple.      Right lower leg: Edema present.      Left lower leg: Edema present.   Skin:     General: Skin is warm and dry.   Neurological:      Mental Status: He is alert and oriented to person, place, and time.           Lab Results: I have reviewed the following results:  .     12/07/24  1002 12/07/24  1019 12/07/24  1220   WBC 8.02  --   --    HGB 8.7*  --   --    HCT 27.9*  --   --      --   --    SODIUM 140  --   --    K 3.9  --   --      --   --    CO2 30  --   --    BUN 36*  --   --    CREATININE 2.21*  --   --    GLUC 111  --   --    AST 18  --   --    ALT 12  --   --    ALB 2.6*  --   --    TBILI 0.38  --   --    ALKPHOS 67  --   --    PTT  --  31  --    INR  --  1.30*  --    HSTNI0 13  --   --    HSTNI2  --   --  11   BNP 42  --   --      ABG: No new results in last 24 hours.    Imaging Results Review: I personally reviewed the following image studies in PACS and associated radiology reports: CT chest. My interpretation of the radiology images/reports is: Small PE in the right lower lobe with associated atelectasis likely infarct and effusion.    VTE Prophylaxis: Heparin

## 2024-12-07 NOTE — H&P
"H&P - Hospitalist   Name: Angel Escoto 69 y.o. male I MRN: [id number]  Unit/Bed#: -01 I Date of Admission: 12/7/2024   Date of Service: 12/7/2024 I Hospital Day: 0     Assessment & Plan  Acute pulmonary embolism (HCC)  Presented with pleuritic chest pain and shortness of breath  Admitted with acute pulmonary embolism  CT chest PE with contrast study showed-\"Acute segmental pulmonary embolus in the posterior basilar segment pulmonary artery of the right lower lobe. Overall clot burden is small.Measured RV/LV ratio is within normal limits at less than 0.9.Possible partially imaged deep venous thrombosis within the proximal left superficial femoral vein. Correlation with venous Dopplers of the lower extremities is advised.\"  Troponin and BNP are normal  Echocardiogram on 12/4 showed LVEF 55% with grade 1 diastolic dysfunction.   Patient had VQ scan on 12/3-showed low probability of PE. Diminished ventilation with preserved perfusion of the left lung base, findings could be related to developing airspace disease or mucous plugging.   Patient was taking loading dose of Eliquis 10 mg twice daily  ER ordered heparin drip.  Continue heparin drip  Pulmonology consult  Will repeat bilateral venous duplex lower extremity  Monitor vitals closely  Hypothyroid  On Synthroid  Mantle cell lymphoma (HCC)  Recent diagnosis. Following with [facility]. Scheduled to start Rituxan on 12/10.   Continue outpatient follow-up at [facility]  Acute deep vein thrombosis (DVT) of right lower extremity (HCC)  Patient diagnosed with DVT with recent admission from 12/3 to 12/5/24  Venous duplex on 12/3  Right lower limb Evaluation shows acute non-occlusive thrombosis in the popliteal vein, posterior tibial veins, and peroneal veins.  Was discharged on Eliquis 10 mg twice daily for 7 days, will continue with 5 mg twice daily afterwards.  Repeat venous duplex of bilateral lower extremity given CT finding of possible proximal left superficial " "femoral vein thrombosis  Continue on IV heparin drip  CKD (chronic kidney disease) stage 4, GFR 15-29 ml/min ([facility])  Lab Results   Component Value Date    EGFR 29 12/07/2024    EGFR 33 12/05/2024    EGFR 29 12/04/2024    CREATININE 2.21 (H) 12/07/2024    CREATININE 1.97 (H) 12/05/2024    CREATININE 2.19 (H) 12/04/2024   Biopsy proven minimal change disease developed about 6 weeks ago. Had gained about 40 lbs of water weight. Temporary dialysis catheter placed for 2 weeks, removed about 1 month ago.   On prednisone taper. Currently on 2.5 mg.   Follows with Dr. Kel Isaac at [facility] and recently saw Dr aVzquez with [facility]   Unclear baseline at this point.    Will request nephrology consult since patient got IV contrast  Continue gentle IV fluids  Anemia  Patient admitted with hemoglobin of 8.7  Discharged on 12/5 with hemoglobin of 8.6  Denies hematuria, hemoptysis or known melena   Transfuse < 7  Patient's previous iron panel consistent with anemia of chronic disease.    Chief Complaint   Patient presents with    Shortness of Breath     Pt states \"Last night I started to have some sob and it really hurts in my back when I take a deep breath.\" Denies any cp. Recently here for DVT        HPI:  Angel Escoto is a 69 y.o. male with past medical history of hypothyroidism, mantle cell lymphoma, anemia, CKD who was recently admitted from 12/3 to 12/5 for being diagnosed with right lower extremity DVT and was sent home with loading Eliquis.  Patient states that he has been compliant with his Eliquis patient states that last evening he developed right-sided pleuritic chest pain and some shortness of breath.  Patient presented to the ER this morning..  Denies any fever, chills, hemoptysis, rectal bleeding, nausea, vomiting, abdominal pain or current complaints.  In ER patient had CT PE study which showed right pulmonary embolism and patient was admitted and placed on IV heparin drip.    Historical Information "   Past Medical History:   Diagnosis Date    Acute nephrotic syndrome     Disease of thyroid gland     DVT (deep venous thrombosis) (HCC)     Mantle cell lymphoma (HCC)      Past Surgical History:   Procedure Laterality Date    ABLATION OF DYSRHYTHMIC FOCUS      IR TUNNELED DIALYSIS CATHETER REMOVAL  11/13/2024     Social History   Social History     Substance and Sexual Activity   Alcohol Use Yes    Comment: rare     Social History     Substance and Sexual Activity   Drug Use Never     Social History     Tobacco Use   Smoking Status Never   Smokeless Tobacco Never     History reviewed. No pertinent family history.    Meds/Allergies   Allergies   Allergen Reactions    Hydromorphone Hallucinations, Other (See Comments), Delirium, Confusion and Visual Disturbance     hallucinosis    Patient saw things that were not there    Other reaction(s): Delirium, Hallucinations, Mental Status Changes, Other (see comments), Visual Disturbance   hallucinosis   hallucinosis   hallucinosis   hallucinosis   Patient saw things that were not there    hallucinosis   hallucinosis   Patient saw things that were not there    hallucinosis   hallucinosis   hallucinosis   hallucinosis   Patient saw things that were not there    hallucinosis   hallucinosis   hallucinosis   hallucinosis   hallucinosis   hallucinosis   Patient saw things that were not there    hallucinosis   hallucinosis   Patient saw things that were not there    hallucinosis    Medical Tape Rash     Itches    Skin irritate with EKG leads       Meds:    Current Facility-Administered Medications:     acetaminophen (TYLENOL) tablet 650 mg, 650 mg, Oral, Q6H PRN, Antonio Nguyen MD    docusate sodium (COLACE) capsule 100 mg, 100 mg, Oral, BID, Antonio Ngueyn MD    DULoxetine (CYMBALTA) delayed release capsule 20 mg, 20 mg, Oral, Daily, Antonio Nguyen MD    [START ON 12/8/2024] famotidine (PEPCID) tablet 20 mg, 20 mg, Oral, Daily, Antonio Nguyen MD    heparin (porcine)  25,000 units in 0.45% NaCl 250 mL infusion (premix), 3-30 Units/kg/hr (Order-Specific), Intravenous, Titrated, Antonio Nguyen MD    heparin (porcine) injection 3,600 Units, 3,600 Units, Intravenous, Q6H PRN, Antonio Nguyen MD    heparin (porcine) injection 7,200 Units, 7,200 Units, Intravenous, Q6H PRN, Antonio Nguyen MD    [START ON 12/8/2024] levothyroxine tablet 100 mcg, 100 mcg, Oral, Daily, Antonio Nguyen MD    [START ON 12/8/2024] losartan (COZAAR) tablet 25 mg, 25 mg, Oral, Daily, Antonio Nugyen MD    ondansetron (ZOFRAN) injection 4 mg, 4 mg, Intravenous, Q6H PRN, Antonio Nguyen MD    [START ON 12/8/2024] predniSONE tablet 2.5 mg, 2.5 mg, Oral, Daily, Antonio Nguyen MD    senna (SENOKOT) tablet 8.6 mg, 8.6 mg, Oral, HS, Antonio Nguyen MD    sodium chloride 0.9 % bolus 500 mL, 500 mL, Intravenous, Once, Antonio Nguyen MD, Last Rate: 500 mL/hr at 12/07/24 1447, 500 mL at 12/07/24 1447    [START ON 12/8/2024] sulfamethoxazole-trimethoprim (BACTRIM) 400-80 mg per tablet 1 tablet, 1 tablet, Oral, Daily, Antonio Nguyen MD    [START ON 12/8/2024] torsemide (DEMADEX) tablet 100 mg, 100 mg, Oral, Daily, Antonio Nguyen MD    traMADol (ULTRAM) tablet 50 mg, 50 mg, Oral, Q6H PRN, Antonio Nguyen MD      Medications Prior to Admission:     acetaminophen (TYLENOL) 325 mg tablet    apixaban (ELIQUIS) 5 mg    calcium carbonate (TUMS) 500 mg chewable tablet    cholecalciferol (VITAMIN D3) 1,000 units tablet    docusate sodium (COLACE) 100 mg capsule    DULoxetine (CYMBALTA) 20 mg capsule    famotidine (PEPCID) 20 mg tablet    levothyroxine 100 mcg tablet    losartan (COZAAR) 25 mg tablet    MAGNESIUM PO    predniSONE 1 mg tablet    predniSONE 10 mg tablet    senna (SENOKOT) 8.6 mg    sulfamethoxazole-trimethoprim (BACTRIM) 400-80 mg per tablet    tadalafil (CIALIS) 5 MG tablet    torsemide (DEMADEX) 100 mg tablet      Review of Systems   Constitutional:  Positive for activity change and  fatigue.   HENT: Negative.     Eyes: Negative.    Respiratory:  Positive for shortness of breath.    Cardiovascular:  Positive for chest pain and leg swelling.   Gastrointestinal: Negative.    Endocrine: Negative.    Genitourinary: Negative.    Musculoskeletal: Negative.    Skin: Negative.    Allergic/Immunologic: Negative.    Neurological: Negative.    Hematological: Negative.    Psychiatric/Behavioral: Negative.         Current Vitals:   Blood Pressure: 143/68 (12/07/24 1415)  Pulse: 71 (12/07/24 1415)  Temperature: 98.4 °F (36.9 °C) (12/07/24 1415)  Temp Source: Oral (12/07/24 1415)  Respirations: 18 (12/07/24 1415)  Weight - Scale: 90.9 kg (200 lb 6.4 oz) (12/07/24 1416)  SpO2: 100 % (12/07/24 1415)  SPO2 RA Rest      Flowsheet Row ED to Hosp-Admission (Current) from 12/7/2024 in [facility] Med Surg Unit   SpO2 100 %   SpO2 Activity At Rest   O2 Device None (Room air)   O2 Flow Rate --          No intake or output data in the 24 hours ending 12/07/24 1510  Body mass index is 27.18 kg/m².     Physical Exam  Vitals and nursing note reviewed.   Constitutional:       General: He is not in acute distress.     Appearance: He is well-developed.   HENT:      Head: Normocephalic and atraumatic.   Eyes:      General: No scleral icterus.     Conjunctiva/sclera: Conjunctivae normal.      Pupils: Pupils are equal, round, and reactive to light.   Neck:      Thyroid: No thyromegaly.      Vascular: No JVD.   Cardiovascular:      Rate and Rhythm: Normal rate and regular rhythm.      Heart sounds: Normal heart sounds.   Pulmonary:      Effort: Pulmonary effort is normal. No respiratory distress.      Breath sounds: Normal breath sounds. No wheezing or rales.   Chest:      Chest wall: No tenderness.   Abdominal:      General: Bowel sounds are normal. There is no distension.      Palpations: Abdomen is soft. There is no mass.      Tenderness: There is no abdominal tenderness. There is no guarding or rebound.    Musculoskeletal:         General: No tenderness or deformity. Normal range of motion.      Cervical back: Normal range of motion and neck supple.      Right lower leg: Edema present.      Left lower leg: Edema present.   Skin:     General: Skin is warm.      Coloration: Skin is not pale.      Findings: No erythema or rash.   Neurological:      General: No focal deficit present.      Mental Status: He is alert and oriented to person, place, and time. Mental status is at baseline.      Cranial Nerves: No cranial nerve deficit.      Coordination: Coordination normal.   Psychiatric:         Behavior: Behavior normal.         Judgment: Judgment normal.         Lab Results:   CBC:   Lab Results   Component Value Date    WBC 8.02 12/07/2024    HGB 8.7 (L) 12/07/2024    HCT 27.9 (L) 12/07/2024     (H) 12/07/2024     12/07/2024    RBC 2.73 (L) 12/07/2024    MCH 31.9 12/07/2024    MCHC 31.2 (L) 12/07/2024    RDW 13.6 12/07/2024    MPV 8.4 (L) 12/07/2024    NRBC 0 12/07/2024     CMP:  Lab Results   Component Value Date     12/07/2024     (L) 10/20/2024    CO2 30 12/07/2024    CO2 26 10/20/2024    BUN 36 (H) 12/07/2024     (H) 10/20/2024    BUN 22 12/21/2022    CREATININE 2.21 (H) 12/07/2024    CREATININE 6.57 (H) 10/20/2024    CALCIUM 8.2 (L) 12/07/2024    CALCIUM 8.2 (L) 10/20/2024    AST 18 12/07/2024    AST 27 10/03/2024    AST 27 11/09/2023    ALT 12 12/07/2024    ALT 19 10/03/2024    ALT 25 11/09/2023    ALKPHOS 67 12/07/2024    ALKPHOS 76 10/03/2024    EGFR 29 12/07/2024    EGFR 20 (L) 10/04/2024    EGFR 95 12/21/2022     Lab Results   Component Value Date    TROPONINI <0.02 12/19/2019    CKTOTAL 78 11/23/2018     Coagulation:   Lab Results   Component Value Date    PT 10.0 10/20/2024    INR 1.30 (H) 12/07/2024    INR 0.9 10/20/2024    APTT 25.4 10/20/2024    Urinalysis:  Lab Results   Component Value Date    COLORU Light Yellow 12/07/2024    CLARITYU Clear 12/07/2024    SPECGRAV 1.015  "12/07/2024    PHUR 7.0 12/07/2024    LEUKOCYTESUR Negative 12/07/2024    NITRITE Negative 12/07/2024    GLUCOSEU Negative 12/07/2024    KETONESU Negative 12/07/2024    BILIRUBINUR Negative 12/07/2024    BLOODU Moderate (A) 12/07/2024      Amylase: No results found for: \"AMYLASE\"  Lipase:   Lab Results   Component Value Date    LIPASE 25 10/03/2024        Imaging: CT pe study w abdomen pelvis w contrast  Result Date: 12/7/2024  Narrative: CT PULMONARY ANGIOGRAM OF THE CHEST AND CT ABDOMEN AND PELVIS WITH INTRAVENOUS CONTRAST INDICATION: r/o PE and pyelo. COMPARISON: CT scan of the chest dated 8/20/2018 TECHNIQUE: CT examination of the chest, abdomen and pelvis was performed. Thin section CT angiographic technique was used in the chest in order to evaluate for pulmonary embolus and coronal 3D MIP postprocessing was performed on the acquisition scanner. Multiplanar 2D reformatted images were created from the source data. This examination, like all CT scans performed in the [facility], was performed utilizing techniques to minimize radiation dose exposure, including the use of iterative reconstruction and automated exposure control. Radiation dose length product (DLP) for this visit: 1011.44 mGy-cm IV Contrast: 100 mL of iohexol (OMNIPAQUE) Enteric Contrast: Not administered. FINDINGS: CHEST PULMONARY ARTERIAL TREE: Filling defect within the pulmonary artery to the posterior basilar segment of the right lower lobe suggestive of a segmental pulmonary embolus. No other sites of pulmonary emboli. Overall clot burden is small. Measured RV/LV ratio is within normal limits at less than 0.9. LUNGS: Mild right basilar atelectasis. No lung nodule or other acute airspace consolidation. PLEURA: Small right pleural effusion. HEART/AORTA: There is mild relative dilation of the left ventricular cardiac chamber with a diameter of 5.7 cm. This could indicate a dilated cardiomyopathy. Mild left atrial enlargement. Small " amount of coronary calcification in the left anterior descending coronary. Minimal aneurysmal dilation of the aortic root measuring 4.1 cm at the sinuses of Valsalva.. MEDIASTINUM AND MERYL: Unremarkable. CHEST WALL AND LOWER NECK: Unremarkable. ABDOMEN LIVER/BILIARY TREE: Unremarkable. GALLBLADDER: Cholecystectomy SPLEEN: Unremarkable. PANCREAS: Unremarkable. ADRENAL GLANDS: Unremarkable. KIDNEYS/URETERS: Low-attenuation lesion in the upper pole of the left kidney that measures 1.9 x 2.0 cm image 49 of series 3 that probably represents a benign cyst. 9 mm low-attenuation focus along the anterior midpole the right kidney on image 85 of series 3 that probably represents a benign cyst. There is trace enhancement of the bilateral ureters that may represent upper urinary tract infection. No CT findings of pyelonephritis. STOMACH AND BOWEL: Occasional colonic diverticulosis without evidence of diverticulitis. No bowel obstruction. There is a small periampullary duodenal diverticulum without complication. APPENDIX: No findings to suggest appendicitis. ABDOMINOPELVIC CAVITY: No ascites. No pneumoperitoneum. Mildly prominent left para-aortic lymph nodes. Left periotic lymph nodes measure 1.4 x 1.5 cm image 76 of series 3. Additional left periaortic lymph node measures 1.7 x 1.7 cm image 84 of series 3. Interaortocaval lymph nodes measure up to 1.2 cm. There is a portacaval lymph node that measures 1.3 x 3.1 cm image 64 of series 3. Small lymph nodes measuring up to a centimeter in size are also noted within the gastrohepatic ligament. VESSELS: Replaced left hepatic artery from the left gastric artery. This is a normal variant. There is relative low attenuation of the left superficial femoral vein on image 205 of series 3. Deep venous thrombosis in this region is not excluded. PELVIS REPRODUCTIVE ORGANS: Status post prostatectomy. URINARY BLADDER: Unremarkable. ABDOMINAL WALL/INGUINAL REGIONS: Probable prior bilateral  inguinal hernia repair without recurrent inguinal hernia. Relative thinning of the right inferior rectus abdominous muscle. This could be a sequela of prior sports hernia. BONES: Mild degenerative change in the bilateral hips. Mild to moderate endplate degenerative change in the lumbar spine. Mild to moderate facet joint arthropathy. There is moderate degenerative disc base narrowing at L3-L4. Mild degenerative disc base narrowing at L4-L5. Small posterior disc protrusions from L3-L4 through L5-S1. Mild bilateral neuroforaminal narrowing at L5-S1.     Impression: Acute segmental pulmonary embolus in the posterior basilar segment pulmonary artery of the right lower lobe. Overall clot burden is small. Measured RV/LV ratio is within normal limits at less than 0.9. Possible partially imaged deep venous thrombosis within the proximal left superficial femoral vein. Correlation with venous Dopplers of the lower extremities is advised. Mildly prominent upper abdominal and retroperitoneal lymph nodes that are nonspecific and may be reactive. Follow-up CT is recommended in 3 months for this finding. Relative dilation of the left ventricular cardiac chamber. Small right pleural effusion with right basilar atelectasis. Possible upper urinary tract infection involving the ureters without CT findings of pyelonephritis. Minimal aneurysmal dilation of the aortic root measuring 4.1 cm at the sinuses of Valsalva. I personally discussed this study with GAVIN NIXON via HIPAA compliant epic secure chat on 12/7/2024 1:30 PM. Workstation performed: [id number]     NM lung ventilation / perfusion  Result Date: 12/4/2024  Narrative: VENTILATION AND PERFUSION SCAN INDICATION: Shortness of breath and history of deep vein thrombosis. COMPARISON:  Chest radiograph 12/3/2024 TECHNIQUE:  Posterior ventilation imaging was performed after the inhalation of 9.4 mCi Xe-133 gas.  Multiplanar perfusion imaging was next performed following the  intravenous administration of 3.8 mCi Tc-99m labeled MAA. FINDINGS: Ventilation imaging demonstrates diminished ventilation in the left lower lung field of unclear etiology. Ventilation on the right appears unremarkable. Perfusion imaging demonstrates greater perfusion at the left base than ventilation. Diffuse slightly heterogeneous pattern of perfusion is noted without evidence of a segmental perfusion defect.     Impression: The probability for pulmonary embolus is low. Etiology for diminished ventilation with preservation of perfusion at the left lung base is unclear. Findings could be related to developing airspace disease or mucous plugging. If clinically indicated, consider noncontrast CT of the chest for further assessment in this patient with history of nephrotic syndrome/SALLIE. Workstation performed: [id number]     VAS VENOUS DUPLEX -LOWER LIMB UNILATERAL  Result Date: 12/4/2024  Narrative:  [facility] REPORT CLINICAL: Indications: Patient presents with right lower extremity pain and swelling that started today. Operative History: No prior cardiovascular surgeries  FINDINGS:  Right       Thrombus  Popliteal   Acute     PostTibial  Acute     Peroneal    Acute        CONCLUSION:  Impression: RIGHT LOWER LIMB Evaluation shows acute non-occlusive thrombosis in the popliteal vein, posterior tibial veins, and peroneal veins. No evidence of superficial thrombophlebitis noted. Doppler evaluation shows a normal response to augmentation maneuvers. Popliteal, posterior tibial and anterior tibial arterial Doppler waveforms are triphasic.  LEFT LOWER LIMB LIMITED Evaluation shows no evidence of thrombus in the common femoral vein. Doppler evaluation shows a normal response to augmentation maneuvers.  Technical findings were given to Dr. Dorantes on 12/3/2024 @ 1830.  SIGNATURE: Electronically Signed by: BISI SYKES MD on 2024-12-04 02:49:23 PM    Echo follow up/limited w/ contrast if indicated  Result Date:  12/4/2024  Narrative:   Left Ventricle: Left ventricular cavity size is mildly dilated. Wall thickness is mildly increased. There is concentric remodeling. The left ventricular ejection fraction is 55%. Systolic function is normal. Global longitudinal strain is mildly reduced at -17%. Wall motion is normal. Diastolic function is mildly abnormal, consistent with grade I (abnormal) relaxation.   Right Ventricle: Right ventricular cavity size is normal. Systolic function is normal.   Left Atrium: The atrium is normal in size.   Right Atrium: The atrium is normal in size.   Aorta: The aortic root is mildly dilated. The ascending aorta is normal in size. The aortic root is 4.60 cm.   Prior TTE study available for comparison. Prior study date: 6/1/2021. No significant changes noted compared to the prior study. Strain was performed to quantify interventricular dyssynchrony and evaluate components of myocardial function due to LVH. Results from the utilization of Strain Analysis are listed in the report below.     XR chest 1 view portable  Result Date: 12/4/2024  Narrative: XR CHEST PORTABLE INDICATION: sob. COMPARISON: 12/19/2019 FINDINGS: Clear lungs. No pneumothorax or pleural effusion. Normal cardiomediastinal silhouette. Bones are unremarkable for age. Normal upper abdomen.     Impression: No acute cardiopulmonary disease. Workstation performed: [id number]     IR tunneled dialysis catheter removal  Result Date: 11/13/2024  Narrative: Perma-Cath removal. Clinical History: Patient presenting for Perma-Cath removal. The existing catheter was prepped and draped in the usual sterile fashion. Lidocaine was administered the skin and subcutaneous tissues. The cuff was dissected free, and the catheter was removed. Manual compression was applied to the puncture site and tunnel until hemostasis was achieved. The patient tolerated the procedure well and suffered no complications.     Impression: Impression: Successful Perma-Cath  "removal as described. Workstation performed: [id number]     EKG, Pathology, and Other Studies: I have personally reviewed the results.  VTE Pharmacologic Prophylaxis: Heparin drip  VTE Mechanical Prophylaxis: sequential compression device    Code Status: Level 1 - Full Code    Anticipated Length of Stay:  Patient will be admitted on an Inpatient basis with an anticipated length of stay of greater than 2 midnights.     Counseling / Coordination of Care  Total floor / unit time spent today 75 minutes.  Greater than 50% of total time was spent with the patient and / or family counseling and / or coordination of care.     \"This note has been constructed using a voice recognition system\"      Antonio Nguyen MD  12/7/2024, 3:10 PM        "

## 2024-12-07 NOTE — ED PROVIDER NOTES
Time reflects when diagnosis was documented in both MDM as applicable and the Disposition within this note       Time User Action Codes Description Comment    12/7/2024  1:33 PM Doris Sanchez Add [I26.99] Pulmonary embolism (HCC)     12/7/2024  1:33 PM Doris Sanchez Add [N17.9] SALLIE (acute kidney injury) (HCC)           ED Disposition       ED Disposition   Admit    Condition   Stable    Date/Time   Sat Dec 7, 2024  1:32 PM    Comment   Case was discussed with hospitalist and the patient's admission status was agreed to be Admission Status: inpatient status to the service of Dr. Tucker .               Assessment & Plan       Medical Decision Making    Assessment and plan:  Differential includes pulmonary embolism versus UTI/Dorian versus musculoskeletal pain.  Patient is high risk in terms of his Wells score for a pulmonary embolism, therefore will repeat lab work and discussed with nephrology in terms of obtaining CTA imaging at this time.  Will start a 500 cc bag of fluids prophylactically.    Review of medical records, specifically from discharge summary from 12/5/2024 shows that the patient has a past medical history significant for acute right lower extremity DVT started on Eliquis, chronic kidney disease stage IV, anemia, hypothyroidism, mantle cell lymphoma scheduled to start Rituxan on 12/10.    Amount and/or Complexity of Data Reviewed  Labs: ordered. Decision-making details documented in ED Course.  Radiology: ordered.    Risk  OTC drugs.        ED Course as of 12/07/24 1358   Sat Dec 07, 2024   1038 D-Dimer, Quant(!): 3.54  Down from 14 three days prior, though still elevated.   1039 GFR, Calculated: 29  Gfr is 29, worse than upon DC.    1100 Discussed case with Dr. Vazquez, nephrology, and he agrees with doing CTA which he discussed risks/benefits of with patient and wife after I did as well. He agrees with the 500 bolus before and then another 250mL after.    1329 Radiologist, Victor M Choi reached out to  "me to relay that the patient is found to have an acute pulmonary embolus in the right lower lobe. Small clot burden.  No heart strain   1331 As patient is already on Eliquis, now with new symptoms and acute diagnosis of pulmonary embolism, will admit patient on heparin.   1354 Took his dose of Eliquis this morning at 9:30 AM and so that heparin will start at 9:30 PM       Medications   heparin (VTE/PE) high (has no administration in time range)   multi-electrolyte (ISOLYTE-S PH 7.4) bolus 250 mL (has no administration in time range)   sodium chloride 0.9 % bolus 500 mL (500 mL Intravenous New Bag 12/7/24 1005)   acetaminophen (TYLENOL) tablet 975 mg (975 mg Oral Given 12/7/24 1021)   iohexol (OMNIPAQUE) 350 MG/ML injection (MULTI-DOSE) 100 mL (100 mL Intravenous Given 12/7/24 1235)       ED Risk Strat Scores   HEART Risk Score      Flowsheet Row Most Recent Value   Heart Score Risk Calculator    History 0 Filed at: 12/07/2024 1357   ECG 0 Filed at: 12/07/2024 1357   Age 2 Filed at: 12/07/2024 1357   Risk Factors 2 Filed at: 12/07/2024 1357   Troponin 0 Filed at: 12/07/2024 1357   HEART Score 4 Filed at: 12/07/2024 1357                                   Wells' Criteria for PE      Flowsheet Row Most Recent Value   Wells' Criteria for PE    Clinical signs and symptoms of DVT 3 Filed at: 12/07/2024 0949   PE is primary diagnosis or equally likely 3 Filed at: 12/07/2024 0949   HR >100 0 Filed at: 12/07/2024 0949   Immobilization at least 3 days or Surgery in the previous 4 weeks 0 Filed at: 12/07/2024 0949   Previous, objectively diagnosed PE or DVT 1.5 Filed at: 12/07/2024 0949   Hemoptysis 0 Filed at: 12/07/2024 0949   Malignancy with treatment within 6 months or palliative 1 Filed at: 12/07/2024 0949   Wells' Criteria Total 8.5 Filed at: 12/07/2024 0949                        History of Present Illness       Chief Complaint   Patient presents with    Shortness of Breath     Pt states \"Last night I started to have " "some sob and it really hurts in my back when I take a deep breath.\" Denies any cp. Recently here for DVT       Past Medical History:   Diagnosis Date    Acute nephrotic syndrome     Disease of thyroid gland     DVT (deep venous thrombosis) (HCC)     Mantle cell lymphoma (HCC)       Past Surgical History:   Procedure Laterality Date    ABLATION OF DYSRHYTHMIC FOCUS      IR TUNNELED DIALYSIS CATHETER REMOVAL  11/13/2024      History reviewed. No pertinent family history.   Social History     Tobacco Use    Smoking status: Never    Smokeless tobacco: Never   Vaping Use    Vaping status: Never Used   Substance Use Topics    Alcohol use: Yes     Comment: rare    Drug use: Never      E-Cigarette/Vaping    E-Cigarette Use Never User       E-Cigarette/Vaping Substances    Nicotine No     THC No     CBD No     Flavoring No     Other No     Unknown No       I have reviewed and agree with the history as documented.     Patient is a 69-year-old male who presents with right sided posterior lower rib cage/upper flank pain that started at 9 PM last night.  Patient states that the pain is constant, nonradiating, worse with deep inspiration, moderate in intensity.  No other associated symptoms.  States that he does not feel \"short of breath, but if he tries to take a deep breath the pain takes his breath away.           Review of Systems   Constitutional:  Negative for chills and fever.   Respiratory:  Negative for chest tightness and shortness of breath.    Cardiovascular:  Negative for chest pain and palpitations.   Gastrointestinal:  Negative for abdominal pain, nausea and vomiting.   Genitourinary:  Positive for flank pain. Negative for dysuria and hematuria.   Musculoskeletal:  Negative for back pain and neck pain.   Skin:  Negative for rash.   Neurological:  Negative for dizziness, light-headedness and headaches.           Objective       ED Triage Vitals [12/07/24 0947]   Temperature Pulse Blood Pressure Respirations SpO2 " Patient Position - Orthostatic VS   97.7 °F (36.5 °C) 79 149/68 20 97 % Sitting      Temp Source Heart Rate Source BP Location FiO2 (%) Pain Score    Temporal Monitor Right arm -- 5      Vitals      Date and Time Temp Pulse SpO2 Resp BP Pain Score FACES Pain Rating User   12/07/24 1330 -- 65 98 % 11 143/68 -- -- SD   12/07/24 1315 -- 66 99 % 13 163/77 -- patient resting quietly. -- SD   12/07/24 1300 -- 67 98 % 14 163/77 -- -- SD   12/07/24 1221 -- -- -- -- -- -- fine when I'm not moving -- SD   12/07/24 1200 -- 69 99 % 18 138/66 -- -- AL   12/07/24 1130 -- 67 99 % 18 135/63 -- -- AL   12/07/24 0947 97.7 °F (36.5 °C) 79 97 % 20 149/68 5 --             Physical Exam  Vitals and nursing note reviewed.   Constitutional:       General: He is not in acute distress.     Appearance: Normal appearance. He is not ill-appearing, toxic-appearing or diaphoretic.   HENT:      Head: Normocephalic and atraumatic.      Mouth/Throat:      Mouth: Mucous membranes are moist.   Eyes:      Conjunctiva/sclera: Conjunctivae normal.      Pupils: Pupils are equal, round, and reactive to light.   Cardiovascular:      Rate and Rhythm: Normal rate and regular rhythm.      Pulses: Normal pulses.      Heart sounds: Normal heart sounds. No murmur heard.  Pulmonary:      Effort: Pulmonary effort is normal. No tachypnea, accessory muscle usage or respiratory distress.      Breath sounds: Normal breath sounds. No stridor. No decreased breath sounds, wheezing, rhonchi or rales.   Chest:      Chest wall: Tenderness present.      Comments: Tender over the posterior aspect of the lower right rib cage  Abdominal:      General: Bowel sounds are normal. There is no distension.      Palpations: Abdomen is soft.      Tenderness: There is no abdominal tenderness. There is no guarding or rebound.   Musculoskeletal:      Cervical back: Neck supple.      Right lower leg: Edema present.      Left lower leg: Edema present.   Skin:     General: Skin is warm and  dry.   Neurological:      General: No focal deficit present.      Mental Status: He is alert and oriented to person, place, and time. Mental status is at baseline.         Results Reviewed       Procedure Component Value Units Date/Time    B-Type Natriuretic Peptide(BNP) [[id number]] Collected: 12/07/24 1002    Lab Status: In process Specimen: Blood Updated: 12/07/24 1357    APTT [[id number]]     Lab Status: No result Specimen: Blood     HS Troponin I 2hr [[id number]]  (Normal) Collected: 12/07/24 1220    Lab Status: Final result Specimen: Blood from Arm, Right Updated: 12/07/24 1304     hs TnI 2hr 11 ng/L      Delta 2hr hsTnI -2 ng/L     HS Troponin I 4hr [[id number]]     Lab Status: No result Specimen: Blood     Urine Microscopic [[id number]]  (Abnormal) Collected: 12/07/24 1038    Lab Status: Final result Specimen: Urine, Other Updated: 12/07/24 1108     RBC, UA 4-10 /hpf      WBC, UA None Seen /hpf      Epithelial Cells None Seen /hpf      Bacteria, UA None Seen /hpf     UA w Reflex to Microscopic w Reflex to Culture [[id number]]  (Abnormal) Collected: 12/07/24 1038    Lab Status: Final result Specimen: Urine, Other Updated: 12/07/24 1057     Color, UA Light Yellow     Clarity, UA Clear     Specific Gravity, UA 1.015     pH, UA 7.0     Leukocytes, UA Negative     Nitrite, UA Negative     Protein,  (2+) mg/dl      Glucose, UA Negative mg/dl      Ketones, UA Negative mg/dl      Urobilinogen, UA <2.0 mg/dl      Bilirubin, UA Negative     Occult Blood, UA Moderate    HS Troponin 0hr (reflex protocol) [[id number]]  (Normal) Collected: 12/07/24 1002    Lab Status: Final result Specimen: Blood from Arm, Right Updated: 12/07/24 1040     hs TnI 0hr 13 ng/L     Comprehensive metabolic panel [[id number]]  (Abnormal) Collected: 12/07/24 1002    Lab Status: Final result Specimen: Blood from Arm, Right Updated: 12/07/24 1035     Sodium 140 mmol/L      Potassium 3.9 mmol/L      Chloride 106 mmol/L      CO2 30 mmol/L      ANION  GAP 4 mmol/L      BUN 36 mg/dL      Creatinine 2.21 mg/dL      Glucose 111 mg/dL      Calcium 8.2 mg/dL      Corrected Calcium 9.3 mg/dL      AST 18 U/L      ALT 12 U/L      Alkaline Phosphatase 67 U/L      Total Protein 5.2 g/dL      Albumin 2.6 g/dL      Total Bilirubin 0.38 mg/dL      eGFR 29 ml/min/1.73sq m     Narrative:      National Kid[employer] guidelines for Chronic Kidney Disease (CKD):     Stage 1 with normal or high GFR (GFR > 90 mL/min/1.73 square meters)    Stage 2 Mild CKD (GFR = 60-89 mL/min/1.73 square meters)    Stage 3A Moderate CKD (GFR = 45-59 mL/min/1.73 square meters)    Stage 3B Moderate CKD (GFR = 30-44 mL/min/1.73 square meters)    Stage 4 Severe CKD (GFR = 15-29 mL/min/1.73 square meters)    Stage 5 End Stage CKD (GFR <15 mL/min/1.73 square meters)  Note: GFR calculation is accurate only with a steady state creatinine    D-dimer, quantitative [[id number]]  (Abnormal) Collected: 12/07/24 1019    Lab Status: Final result Specimen: Blood Updated: 12/07/24 1032     D-Dimer, Quant 3.54 ug/ml FEU     Narrative:      In the evaluation for possible pulmonary embolism, in the appropriate (Well's Score of 4 or less) patient, the age adjusted d-dimer cutoff for this patient can be calculated as:    Age x 0.01 (in ug/mL) for Age-adjusted D-dimer exclusion threshold for a patient over 50 years.    APTT [[id number]]  (Normal) Collected: 12/07/24 1019    Lab Status: Final result Specimen: Blood Updated: 12/07/24 1028     PTT 31 seconds     Protime-INR [[id number]]  (Abnormal) Collected: 12/07/24 1019    Lab Status: Final result Specimen: Blood Updated: 12/07/24 1028     Protime 16.7 seconds      INR 1.30    Narrative:      INR Therapeutic Range    Indication                                             INR Range      Atrial Fibrillation                                               2.0-3.0  Hypercoagulable State                                    2.0.2.3  Left Ventricular Asist Device                             2.0-3.0  Mechanical Heart Valve                                  -    Aortic(with afib, MI, embolism, HF, LA enlargement,    and/or coagulopathy)                                     2.0-3.0 (2.5-3.5)     Mitral                                                             2.5-3.5  Prosthetic/Bioprosthetic Heart Valve               2.0-3.0  Venous thromboembolism (VTE: VT, PE        2.0-3.0    CBC and differential [[id number]]  (Abnormal) Collected: 12/07/24 1002    Lab Status: Final result Specimen: Blood from Arm, Right Updated: 12/07/24 1013     WBC 8.02 Thousand/uL      RBC 2.73 Million/uL      Hemoglobin 8.7 g/dL      Hematocrit 27.9 %       fL      MCH 31.9 pg      MCHC 31.2 g/dL      RDW 13.6 %      MPV 8.4 fL      Platelets 244 Thousands/uL      nRBC 0 /100 WBCs      Segmented % 66 %      Immature Grans % 1 %      Lymphocytes % 22 %      Monocytes % 7 %      Eosinophils Relative 3 %      Basophils Relative 1 %      Absolute Neutrophils 5.25 Thousands/µL      Absolute Immature Grans 0.11 Thousand/uL      Absolute Lymphocytes 1.75 Thousands/µL      Absolute Monocytes 0.59 Thousand/µL      Eosinophils Absolute 0.27 Thousand/µL      Basophils Absolute 0.05 Thousands/µL             CT pe study w abdomen pelvis w contrast   Final Interpretation by Victor M Choi MD (12/07 1336)      Acute segmental pulmonary embolus in the posterior basilar segment pulmonary artery of the right lower lobe. Overall clot burden is small.      Measured RV/LV ratio is within normal limits at less than 0.9.      Possible partially imaged deep venous thrombosis within the proximal left superficial femoral vein. Correlation with venous Dopplers of the lower extremities is advised.      Mildly prominent upper abdominal and retroperitoneal lymph nodes that are nonspecific and may be reactive. Follow-up CT is recommended in 3 months for this finding.      Relative dilation of the left ventricular cardiac chamber.       Small right pleural effusion with right basilar atelectasis.      Possible upper urinary tract infection involving the ureters without CT findings of pyelonephritis.      Minimal aneurysmal dilation of the aortic root measuring 4.1 cm at the sinuses of Valsalva.         I personally discussed this study with GAVIN NIXON via HIPAA compliant epic secure chat on 12/7/2024 1:30 PM.            Workstation performed: [id number]             Procedures    ED Medication and Procedure Management   Prior to Admission Medications   Prescriptions Last Dose Informant Patient Reported? Taking?   DULoxetine (CYMBALTA) 20 mg capsule  Self Yes No   Sig: Take 20 mg by mouth daily   MAGNESIUM PO  Self Yes No   Sig: Take 1 tablet by mouth daily   acetaminophen (TYLENOL) 325 mg tablet   Yes No   Sig: every 6 (six) hours as needed   apixaban (ELIQUIS) 5 mg   No No   Sig: Take 2 tablets (10 mg total) by mouth 2 (two) times a day for 6 days, THEN 1 tablet (5 mg total) 2 (two) times a day.   calcium carbonate (TUMS) 500 mg chewable tablet   Yes No   Sig: Chew 1 tablet daily   cholecalciferol (VITAMIN D3) 1,000 units tablet   No No   Sig: Take 2 tablets (2,000 Units total) by mouth daily   docusate sodium (COLACE) 100 mg capsule   No No   Sig: Take 1 capsule (100 mg total) by mouth 2 (two) times a day   famotidine (PEPCID) 20 mg tablet   No No   Sig: Take 1 tablet (20 mg total) by mouth in the morning   levothyroxine 100 mcg tablet  Self Yes No   Sig: TAKE 1 TABLET BY MOUTH DAILY   losartan (COZAAR) 25 mg tablet   No No   Sig: Take 1 tablet (25 mg total) by mouth daily   predniSONE 1 mg tablet   No No   Sig: Take 5 tablets (5 mg total) by mouth daily for 5 days, THEN 2.5 tablets (2.5 mg total) daily for 5 days, THEN 1 tablet (1 mg total) daily for 5 days. Do not start before November 27, 2024.   predniSONE 10 mg tablet   No No   Sig: Take 5 tablets (50 mg total) by mouth daily for 5 days, THEN 4 tablets (40 mg total) daily for 5 days,  THEN 3 tablets (30 mg total) daily for 5 days, THEN 2 tablets (20 mg total) daily for 5 days, THEN 1 tablet (10 mg total) daily for 5 days.   senna (SENOKOT) 8.6 mg   Yes No   Sig: Take 8.6 mg by mouth as needed   sulfamethoxazole-trimethoprim (BACTRIM) 400-80 mg per tablet   Yes No   Sig: Take 1 tablet by mouth daily   tadalafil (CIALIS) 5 MG tablet  Self Yes No   Sig: Take 5 mg by mouth in the morning   torsemide (DEMADEX) 100 mg tablet   No No   Sig: Take 1 tablet (100 mg total) by mouth daily      Facility-Administered Medications: None     Patient's Medications   Discharge Prescriptions    No medications on file     No discharge procedures on file.  ED SEPSIS DOCUMENTATION   Time reflects when diagnosis was documented in both MDM as applicable and the Disposition within this note       Time User Action Codes Description Comment    12/7/2024  1:33 PM Doris Sanchez [I26.99] Pulmonary embolism (HCC)     12/7/2024  1:33 PM Doris Sanchez [N17.9] SALLIE (acute kidney injury) (HCC)                  Doris Sanchez DO  12/07/24 1358

## 2024-12-08 LAB
ALBUMIN SERPL BCG-MCNC: 2.3 G/DL (ref 3.5–5)
ALP SERPL-CCNC: 56 U/L (ref 34–104)
ALT SERPL W P-5'-P-CCNC: 10 U/L (ref 7–52)
ANION GAP SERPL CALCULATED.3IONS-SCNC: 4 MMOL/L (ref 4–13)
APTT PPP: 107 SECONDS (ref 23–34)
APTT PPP: 69 SECONDS (ref 23–34)
APTT PPP: 82 SECONDS (ref 23–34)
AST SERPL W P-5'-P-CCNC: 15 U/L (ref 13–39)
BASOPHILS # BLD AUTO: 0.07 THOUSANDS/ÂΜL (ref 0–0.1)
BASOPHILS NFR BLD AUTO: 1 % (ref 0–1)
BILIRUB SERPL-MCNC: 0.3 MG/DL (ref 0.2–1)
BUN SERPL-MCNC: 36 MG/DL (ref 5–25)
CALCIUM ALBUM COR SERPL-MCNC: 8.7 MG/DL (ref 8.3–10.1)
CALCIUM SERPL-MCNC: 7.3 MG/DL (ref 8.4–10.2)
CHLORIDE SERPL-SCNC: 109 MMOL/L (ref 96–108)
CO2 SERPL-SCNC: 28 MMOL/L (ref 21–32)
CREAT SERPL-MCNC: 1.94 MG/DL (ref 0.6–1.3)
EOSINOPHIL # BLD AUTO: 0.28 THOUSAND/ÂΜL (ref 0–0.61)
EOSINOPHIL NFR BLD AUTO: 4 % (ref 0–6)
ERYTHROCYTE [DISTWIDTH] IN BLOOD BY AUTOMATED COUNT: 13.7 % (ref 11.6–15.1)
GFR SERPL CREATININE-BSD FRML MDRD: 34 ML/MIN/1.73SQ M
GLUCOSE SERPL-MCNC: 91 MG/DL (ref 65–140)
HCT VFR BLD AUTO: 24.6 % (ref 36.5–49.3)
HCT VFR BLD AUTO: 28.6 % (ref 36.5–49.3)
HGB BLD-MCNC: 7.7 G/DL (ref 12–17)
HGB BLD-MCNC: 9 G/DL (ref 12–17)
IMM GRANULOCYTES # BLD AUTO: 0.08 THOUSAND/UL (ref 0–0.2)
IMM GRANULOCYTES NFR BLD AUTO: 1 % (ref 0–2)
INR PPP: 1.02 (ref 0.85–1.19)
LYMPHOCYTES # BLD AUTO: 2.43 THOUSANDS/ÂΜL (ref 0.6–4.47)
LYMPHOCYTES NFR BLD AUTO: 31 % (ref 14–44)
MAGNESIUM SERPL-MCNC: 2.2 MG/DL (ref 1.9–2.7)
MCH RBC QN AUTO: 31.6 PG (ref 26.8–34.3)
MCHC RBC AUTO-ENTMCNC: 31.3 G/DL (ref 31.4–37.4)
MCV RBC AUTO: 101 FL (ref 82–98)
MONOCYTES # BLD AUTO: 0.68 THOUSAND/ÂΜL (ref 0.17–1.22)
MONOCYTES NFR BLD AUTO: 9 % (ref 4–12)
NEUTROPHILS # BLD AUTO: 4.23 THOUSANDS/ÂΜL (ref 1.85–7.62)
NEUTS SEG NFR BLD AUTO: 54 % (ref 43–75)
NRBC BLD AUTO-RTO: 0 /100 WBCS
PHOSPHATE SERPL-MCNC: 3.4 MG/DL (ref 2.3–4.1)
PLATELET # BLD AUTO: 237 THOUSANDS/UL (ref 149–390)
PMV BLD AUTO: 8.6 FL (ref 8.9–12.7)
POTASSIUM SERPL-SCNC: 4.1 MMOL/L (ref 3.5–5.3)
PROT SERPL-MCNC: 4.5 G/DL (ref 6.4–8.4)
PROTHROMBIN TIME: 13.9 SECONDS (ref 12.3–15)
RBC # BLD AUTO: 2.44 MILLION/UL (ref 3.88–5.62)
SODIUM SERPL-SCNC: 141 MMOL/L (ref 135–147)
WBC # BLD AUTO: 7.77 THOUSAND/UL (ref 4.31–10.16)

## 2024-12-08 PROCEDURE — 85730 THROMBOPLASTIN TIME PARTIAL: CPT | Performed by: INTERNAL MEDICINE

## 2024-12-08 PROCEDURE — 83735 ASSAY OF MAGNESIUM: CPT | Performed by: INTERNAL MEDICINE

## 2024-12-08 PROCEDURE — 84100 ASSAY OF PHOSPHORUS: CPT | Performed by: INTERNAL MEDICINE

## 2024-12-08 PROCEDURE — 85014 HEMATOCRIT: CPT | Performed by: INTERNAL MEDICINE

## 2024-12-08 PROCEDURE — 85610 PROTHROMBIN TIME: CPT | Performed by: INTERNAL MEDICINE

## 2024-12-08 PROCEDURE — 85018 HEMOGLOBIN: CPT | Performed by: INTERNAL MEDICINE

## 2024-12-08 PROCEDURE — 80053 COMPREHEN METABOLIC PANEL: CPT | Performed by: INTERNAL MEDICINE

## 2024-12-08 PROCEDURE — 99255 IP/OBS CONSLTJ NEW/EST HI 80: CPT | Performed by: INTERNAL MEDICINE

## 2024-12-08 PROCEDURE — 99232 SBSQ HOSP IP/OBS MODERATE 35: CPT | Performed by: INTERNAL MEDICINE

## 2024-12-08 PROCEDURE — 85025 COMPLETE CBC W/AUTO DIFF WBC: CPT | Performed by: INTERNAL MEDICINE

## 2024-12-08 RX ORDER — TORSEMIDE 20 MG/1
60 TABLET ORAL DAILY
Status: DISCONTINUED | OUTPATIENT
Start: 2024-12-09 | End: 2024-12-09 | Stop reason: HOSPADM

## 2024-12-08 RX ADMIN — SULFAMETHOXAZOLE AND TRIMETHOPRIM 1 TABLET: 400; 80 TABLET ORAL at 09:29

## 2024-12-08 RX ADMIN — HEPARIN SODIUM 16 UNITS/KG/HR: 10000 INJECTION, SOLUTION INTRAVENOUS at 07:23

## 2024-12-08 RX ADMIN — TADALAFIL 5 MG: 5 TABLET ORAL at 09:29

## 2024-12-08 RX ADMIN — ACETAMINOPHEN 650 MG: 325 TABLET, FILM COATED ORAL at 04:43

## 2024-12-08 RX ADMIN — TORSEMIDE 100 MG: 20 TABLET ORAL at 09:30

## 2024-12-08 RX ADMIN — DULOXETINE HYDROCHLORIDE 20 MG: 20 CAPSULE, DELAYED RELEASE ORAL at 09:30

## 2024-12-08 RX ADMIN — LEVOTHYROXINE SODIUM 100 MCG: 100 TABLET ORAL at 04:43

## 2024-12-08 RX ADMIN — DOCUSATE SODIUM 100 MG: 100 CAPSULE, LIQUID FILLED ORAL at 17:20

## 2024-12-08 RX ADMIN — PREDNISONE 2.5 MG: 1 TABLET ORAL at 09:29

## 2024-12-08 RX ADMIN — DOCUSATE SODIUM 100 MG: 100 CAPSULE, LIQUID FILLED ORAL at 09:30

## 2024-12-08 RX ADMIN — FAMOTIDINE 20 MG: 20 TABLET, FILM COATED ORAL at 09:30

## 2024-12-08 NOTE — PLAN OF CARE
Problem: PAIN - ADULT  Goal: Verbalizes/displays adequate comfort level or baseline comfort level  Description: Interventions:  - Encourage patient to monitor pain and request assistance  - Assess pain using appropriate pain scale  - Administer analgesics based on type and severity of pain and evaluate response  - Implement non-pharmacological measures as appropriate and evaluate response  - Consider cultural and social influences on pain and pain management  - Notify physician/advanced practitioner if interventions unsuccessful or patient reports new pain  Outcome: Progressing     Problem: INFECTION - ADULT  Goal: Absence or prevention of progression during hospitalization  Description: INTERVENTIONS:  - Assess and monitor for signs and symptoms of infection  - Monitor lab/diagnostic results  - Monitor all insertion sites, i.e. indwelling lines, tubes, and drains  - Monitor endotracheal if appropriate and nasal secretions for changes in amount and color  - Hoven appropriate cooling/warming therapies per order  - Administer medications as ordered  - Instruct and encourage patient and family to use good hand hygiene technique  - Identify and instruct in appropriate isolation precautions for identified infection/condition  Outcome: Progressing  Goal: Absence of fever/infection during neutropenic period  Description: INTERVENTIONS:  - Monitor WBC    Outcome: Progressing     Problem: SAFETY ADULT  Goal: Patient will remain free of falls  Description: INTERVENTIONS:  - Educate patient/family on patient safety including physical limitations  - Instruct patient to call for assistance with activity   - Consult OT/PT to assist with strengthening/mobility   - Keep Call bell within reach  - Keep bed low and locked with side rails adjusted as appropriate  - Keep care items and personal belongings within reach  - Initiate and maintain comfort rounds  - Make Fall Risk Sign visible to staff  - Offer Toileting every 2 Hours,  in advance of need  - Initiate/Maintain 2 alarms  - Obtain necessary fall risk management equipment: bed alarm on. Patient moved to room near nurses station.  - Apply yellow socks and bracelet for high fall risk patients  - Consider moving patient to room near nurses station  Outcome: Progressing  Goal: Maintain or return to baseline ADL function  Description: INTERVENTIONS:  -  Assess patient's ability to carry out ADLs; assess patient's baseline for ADL function and identify physical deficits which impact ability to perform ADLs (bathing, care of mouth/teeth, toileting, grooming, dressing, etc.)  - Assess/evaluate cause of self-care deficits   - Assess range of motion  - Assess patient's mobility; develop plan if impaired  - Assess patient's need for assistive devices and provide as appropriate  - Encourage maximum independence but intervene and supervise when necessary  - Involve family in performance of ADLs  - Assess for home care needs following discharge   - Consider OT consult to assist with ADL evaluation and planning for discharge  - Provide patient education as appropriate  Outcome: Progressing  Goal: Maintains/Returns to pre admission functional level  Description: INTERVENTIONS:  - Perform AM-PAC 6 Click Basic Mobility/ Daily Activity assessment daily.  - Set and communicate daily mobility goal to care team and patient/family/caregiver.   - Collaborate with rehabilitation services on mobility goals if consulted  - Perform Range of Motion 2 times a day.  - Reposition patient every 2 hours.  - Dangle patient 2 times a day  - Stand patient 2 times a day  - Ambulate patient 2 times a day  - Out of bed to chair 2 times a day   - Out of bed for meals 2 times a day  - Out of bed for toileting  - Record patient progress and toleration of activity level   Outcome: Progressing     Problem: DISCHARGE PLANNING  Goal: Discharge to home or other facility with appropriate resources  Description: INTERVENTIONS:  -  Identify barriers to discharge w/patient and caregiver  - Arrange for needed discharge resources and transportation as appropriate  - Identify discharge learning needs (meds, wound care, etc.)  - Arrange for interpretive services to assist at discharge as needed  - Refer to Case Management Department for coordinating discharge planning if the patient needs post-hospital services based on physician/advanced practitioner order or complex needs related to functional status, cognitive ability, or social support system  Outcome: Progressing     Problem: Knowledge Deficit  Goal: Patient/family/caregiver demonstrates understanding of disease process, treatment plan, medications, and discharge instructions  Description: Complete learning assessment and assess knowledge base.  Interventions:  - Provide teaching at level of understanding  - Provide teaching via preferred learning methods  Outcome: Progressing     Problem: CARDIOVASCULAR - ADULT  Goal: Maintains optimal cardiac output and hemodynamic stability  Description: INTERVENTIONS:  - Monitor I/O, vital signs and rhythm  - Monitor for S/S and trends of decreased cardiac output  - Administer and titrate ordered vasoactive medications to optimize hemodynamic stability  - Assess quality of pulses, skin color and temperature  - Assess for signs of decreased coronary artery perfusion  - Instruct patient to report change in severity of symptoms  Outcome: Progressing  Goal: Absence of cardiac dysrhythmias or at baseline rhythm  Description: INTERVENTIONS:  - Continuous cardiac monitoring, vital signs, obtain 12 lead EKG if ordered  - Administer antiarrhythmic and heart rate control medications as ordered  - Monitor electrolytes and administer replacement therapy as ordered  Outcome: Progressing     Problem: RESPIRATORY - ADULT  Goal: Achieves optimal ventilation and oxygenation  Description: INTERVENTIONS:  - Assess for changes in respiratory status  - Assess for changes in  mentation and behavior  - Position to facilitate oxygenation and minimize respiratory effort  - Oxygen administered by appropriate delivery if ordered  - Initiate smoking cessation education as indicated  - Encourage broncho-pulmonary hygiene including cough, deep breathe, Incentive Spirometry  - Assess the need for suctioning and aspirate as needed  - Assess and instruct to report SOB or any respiratory difficulty  - Respiratory Therapy support as indicated  Outcome: Progressing     Problem: HEMATOLOGIC - ADULT  Goal: Maintains hematologic stability  Description: INTERVENTIONS  - Assess for signs and symptoms of bleeding or hemorrhage  - Monitor labs  - Administer supportive blood products/factors as ordered and appropriate  Outcome: Progressing

## 2024-12-08 NOTE — RESPIRATORY THERAPY NOTE
RT Protocol Note  Angel Escoto 69 y.o. male MRN: [id number]  Unit/Bed#: -01 Encounter: [id number]    Assessment    Principal Problem:    Acute pulmonary embolism (HCC)  Active Problems:    Hypothyroid    Mantle cell lymphoma (HCC)    Acute deep vein thrombosis (DVT) of right lower extremity (HCC)    Anemia    CKD (chronic kidney disease) stage 4, GFR 15-29 ml/min (HCC)      Home Pulmonary Medications:   N/A    Past Medical History:   Diagnosis Date    Acute nephrotic syndrome     Disease of thyroid gland     DVT (deep venous thrombosis) (HCC)     Mantle cell lymphoma (HCC)      Social History     Socioeconomic History    Marital status: /Civil Union     Spouse name: None    Number of children: None    Years of education: None    Highest education level: None   Occupational History    None   Tobacco Use    Smoking status: Never    Smokeless tobacco: Never   Vaping Use    Vaping status: Never Used   Substance and Sexual Activity    Alcohol use: Yes     Comment: rare    Drug use: Never    Sexual activity: None   Other Topics Concern    None   Social History Narrative    None     Social Drivers of Health     Financial Resource Strain: Low Risk  (10/23/2024)    Received from [facility]    Overall Financial Resource Strain (CARDIA)     Difficulty of Paying Living Expenses: Not hard at all   Food Insecurity: No Food Insecurity (12/4/2024)    Nursing - Inadequate Food Risk Classification     Worried About Running Out of Food in the Last Year: Not on file     Ran Out of Food in the Last Year: Not on file     Ran Out of Food in the Last Year: 1   Transportation Needs: No Transportation Needs (12/4/2024)    Nursing - Transportation Risk Classification     Lack of Transportation: Not on file     Lack of Transportation: 2   Physical Activity: Inactive (10/23/2024)    Received from [facility]    Exercise Vital Sign     Days of Exercise per Week: 0 days      Minutes of Exercise per Session: 0 min   Stress: No Stress Concern Present (10/23/2024)    Received from [facility]    [employer] - Occupational Stress Questionnaire     Feeling of Stress : Only a little   Social Connections: Not on file   Intimate Partner Violence: Unknown (2024)    Nursing IPS     Feels Physically and Emotionally Safe: Not on file     Physically Hurt by Someone: Not on file     Humiliated or Emotionally Abused by Someone: Not on file     Physically Hurt by Someone: 2     Hurt or Threatened by Someone: 2   Housing Stability: Unknown (2024)    Nursing: Inadequate Housing Risk Classification     Has Housing: Not on file     Worried About Losing Housing: Not on file     Unable to Get Utilities: Not on file     Unable to Pay for Housing in the Last Year: 2     Has Housin       Subjective         Objective    Physical Exam:   Assessment Type: Assess only  General Appearance: Alert, Awake  Respiratory Pattern: Normal  Chest Assessment: Chest expansion symmetrical  Bilateral Breath Sounds: Diminished  O2 Device: RA    Vitals:  Blood pressure 126/70, pulse 74, temperature 98.3 °F (36.8 °C), resp. rate 18, height 6' (1.829 m), weight 90.9 kg (200 lb 6.4 oz), SpO2 98%.          Imaging and other studies:     O2 Device: RA     Plan       At time of assessment, patient was not in respiratory distress. Patient had normal WOB/respiratory pattern and diminished breath sounds bilaterally. Plan to order PRN if needed for sudden onset of SOB/wheezing.     Resp Comments: Patient has no diagnosed pulmonary hx., no use of home oxygen, no use of breathing medications (inhalers/nebulizers), no use of CPAP/BIPAP, no smoking hx

## 2024-12-08 NOTE — PROGRESS NOTES
"Progress Note - Hospitalist   Name: Angel Escoto 69 y.o. male I MRN: [id number]  Unit/Bed#: -01 I Date of Admission: 12/7/2024   Date of Service: 12/8/2024 I Hospital Day: 1    Assessment & Plan  Acute pulmonary embolism (HCC)  Presented with pleuritic chest pain and shortness of breath  Admitted with acute pulmonary embolism  CT chest PE with contrast study showed-\"Acute segmental pulmonary embolus in the posterior basilar segment pulmonary artery of the right lower lobe. Overall clot burden is small.Measured RV/LV ratio is within normal limits at less than 0.9.Possible partially imaged deep venous thrombosis within the proximal left superficial femoral vein. Correlation with venous Dopplers of the lower extremities is advised.\"  Troponin and BNP are normal  Echocardiogram on 12/4 showed LVEF 55% with grade 1 diastolic dysfunction.   Patient had VQ scan on 12/3-showed low probability of PE. Diminished ventilation with preserved perfusion of the left lung base, findings could be related to developing airspace disease or mucous plugging.   Patient was taking loading dose of Eliquis 10 mg twice daily  ER ordered heparin drip.  Continue heparin drip  Pulmonology consult appreciated  Will repeat bilateral venous duplex lower extremity given CT showing concern for DVT in proximal left superficial femoral vein  Monitor vitals closely  Hypothyroid  On Synthroid  Mantle cell lymphoma (HCC)  Recent diagnosis. Following with [facility]. Scheduled to start Rituxan on 12/10.   Continue outpatient follow-up at [facility]  Acute deep vein thrombosis (DVT) of right lower extremity (HCC)  Patient diagnosed with DVT with recent admission from 12/3 to 12/5/24  Venous duplex on 12/3  Right lower limb Evaluation shows acute non-occlusive thrombosis in the popliteal vein, posterior tibial veins, and peroneal veins.  Was discharged on Eliquis 10 mg twice daily for 7 days, will continue with 5 mg twice daily afterwards.  Repeat venous " duplex of bilateral lower extremity given CT finding of possible proximal left superficial femoral vein thrombosis  Continue on IV heparin drip  CKD (chronic kidney disease) stage 4, GFR 15-29 ml/min ([facility])  Lab Results   Component Value Date    EGFR 34 12/08/2024    EGFR 29 12/07/2024    EGFR 33 12/05/2024    CREATININE 1.94 (H) 12/08/2024    CREATININE 2.21 (H) 12/07/2024    CREATININE 1.97 (H) 12/05/2024   Biopsy proven minimal change disease developed about 6 weeks ago. Had gained about 40 lbs of water weight. Temporary dialysis catheter placed for 2 weeks, removed about 1 month ago.   On prednisone taper. Currently on 2.5 mg.   Follows with Dr. Kel Isaac at [facility] and recently saw Dr Vazquez with [facility] baseline at this point.    Will request nephrology consult since patient got IV contrast  Received IV fluid  Hold Cozaar.  Continue torsemide  Anemia  Patient admitted with hemoglobin of 8.7  Discharged on 12/5 with hemoglobin of 8.6  Hemoglobin this morning is 7.7  Repeat H&H in evening  Denies hematuria, hemoptysis or known melena   Transfuse < 7  Patient's previous iron panel consistent with anemia of chronic disease.    Labs & Imaging: Results Review Statement: No pertinent imaging studies reviewed.    VTE Prophylaxis: in place.    Code Status:   Level 1 - Full Code    Patient Centered Rounds: I have performed bedside rounds with nursing staff today.    Mobility:   Basic Mobility Inpatient Raw Score: 22  JH-HLM Goal: 7: Walk 25 feet or more  JH-HLM Achieved: 6: Walk 10 steps or more  JH-HLM Goal NOT achieved. Continue with multidisciplinary rounding and encourage appropriate mobility to improve upon JH-HLM goals.    Discussions with Specialists or Other Care Team Provider: Nephrology    Education and Discussions with Family / Patient: Wife    Total Time Spent on Date of Encounter in care of patient: 35 mins. This time was spent on one or more of the following: performing physical  exam; counseling and coordination of care; obtaining or reviewing history; documenting in the medical record; reviewing/ordering tests, medications or procedures; communicating with other healthcare professionals and discussing with patient's family/caregivers.    Current Length of Stay: 1 day(s)    Current Patient Status: Inpatient   Certification Statement: The patient will continue to require additional inpatient hospital stay due to see my assessment and plan.     Subjective:   Patient is seen and examined at bedside.  Denies any new complaints.  Pleuritic chest pain has resolved.  Afebrile  All other ROS are negative.    Objective:    Vitals: Blood pressure 126/70, pulse 76, temperature 98.3 °F (36.8 °C), resp. rate 18, height 6' (1.829 m), weight 90.9 kg (200 lb 6.4 oz), SpO2 95%.,Body mass index is 27.18 kg/m².  SPO2 RA Rest      Flowsheet Row ED to Hosp-Admission (Current) from 12/7/2024 in [facility] Med Surg Unit   SpO2 95 %   SpO2 Activity At Rest   O2 Device None (Room air)   O2 Flow Rate --          I&O:   Intake/Output Summary (Last 24 hours) at 12/8/2024 0712  Last data filed at 12/8/2024 0500  Gross per 24 hour   Intake 712 ml   Output 1425 ml   Net -713 ml       Physical Exam:    General- Alert, lying comfortably in bed. Not in any acute distress.  Neck- Supple, No JVD  CVS- regular, S1 and S2 normal  Chest- Bilateral Air entry, No rhochi, crackles or wheezing present.  Abdomen- soft, nontender, not distended, no guarding or rigidity, BS+  Extremities-  has pedal edema, No calf tenderness                         Normal ROM in all extremities.  CNS-   Alert, awake and orientedx3. No focal deficits present.    Invasive Devices       Peripheral Intravenous Line  Duration             Peripheral IV 12/07/24 Right Antecubital <1 day                          Social History  reviewed  History reviewed. No pertinent family history. reviewed    Meds:  Current Facility-Administered  Medications   Medication Dose Route Frequency Provider Last Rate Last Admin    acetaminophen (TYLENOL) tablet 650 mg  650 mg Oral Q6H PRN Antonio Nguyen MD   650 mg at 12/08/24 0443    albuterol inhalation solution 2.5 mg  2.5 mg Nebulization Q4H PRN Antonio Nguyen MD        docusate sodium (COLACE) capsule 100 mg  100 mg Oral BID Antonio Nguyen MD   100 mg at 12/07/24 1720    DULoxetine (CYMBALTA) delayed release capsule 20 mg  20 mg Oral Daily Antonio Nguyen MD        famotidine (PEPCID) tablet 20 mg  20 mg Oral Daily Antonio Nguyen MD        heparin (porcine) 25,000 units in 0.45% NaCl 250 mL infusion (premix)  3-30 Units/kg/hr (Order-Specific) Intravenous Titrated Antonio Nguyen MD 14.4 mL/hr at 12/07/24 2240 16 Units/kg/hr at 12/07/24 2240    heparin (porcine) injection 3,600 Units  3,600 Units Intravenous Q6H PRN Antonio Nguyen MD        heparin (porcine) injection 7,200 Units  7,200 Units Intravenous Q6H PRN Antonio Nguyen MD        levothyroxine tablet 100 mcg  100 mcg Oral Daily Antonio Nguyen MD   100 mcg at 12/08/24 0443    losartan (COZAAR) tablet 25 mg  25 mg Oral Daily Antonio Nguyen MD        ondansetron (ZOFRAN) injection 4 mg  4 mg Intravenous Q6H PRN Antonio Nguyen MD        predniSONE tablet 2.5 mg  2.5 mg Oral Daily Antonio Nguyen MD        senna (SENOKOT) tablet 8.6 mg  8.6 mg Oral HS Antonio Nguyen MD   8.6 mg at 12/07/24 2219    sulfamethoxazole-trimethoprim (BACTRIM) 400-80 mg per tablet 1 tablet  1 tablet Oral Daily Antonio Nguyen MD        tadalafil (CIALIS) tablet 5 mg  5 mg Oral Daily Antonio Nguyen MD   5 mg at 12/07/24 1720    torsemide (DEMADEX) tablet 100 mg  100 mg Oral Daily Antonio Nguyen MD        traMADol (ULTRAM) tablet 50 mg  50 mg Oral Q6H PRN Antonio Nguyen MD            Medications Prior to Admission:     acetaminophen (TYLENOL) 325 mg tablet    apixaban (ELIQUIS) 5 mg    calcium carbonate (TUMS) 500 mg chewable tablet     "cholecalciferol (VITAMIN D3) 1,000 units tablet    docusate sodium (COLACE) 100 mg capsule    DULoxetine (CYMBALTA) 20 mg capsule    famotidine (PEPCID) 20 mg tablet    levothyroxine 100 mcg tablet    losartan (COZAAR) 25 mg tablet    MAGNESIUM PO    predniSONE 1 mg tablet    predniSONE 10 mg tablet    senna (SENOKOT) 8.6 mg    sulfamethoxazole-trimethoprim (BACTRIM) 400-80 mg per tablet    tadalafil (CIALIS) 5 MG tablet    torsemide (DEMADEX) 100 mg tablet    Labs:  Results from last 7 days   Lab Units 12/08/24  0431 12/07/24  1002 12/05/24  0425 12/04/24  0424   WBC Thousand/uL 7.77 8.02 7.83 8.15   HEMOGLOBIN g/dL 7.7* 8.7* 8.6* 8.4*   HEMATOCRIT % 24.6* 27.9* 26.8* 26.4*   PLATELETS Thousands/uL 237 244 263 199   SEGS PCT % 54 66  --  57   LYMPHO PCT % 31 22  --  29   MONO PCT % 9 7  --  9   EOS PCT % 4 3  --  3     Results from last 7 days   Lab Units 12/08/24  0431 12/07/24  1002 12/05/24  0425 12/04/24  0424 12/03/24  1702   POTASSIUM mmol/L 4.1 3.9 4.2   < > 4.4   CHLORIDE mmol/L 109* 106 108   < > 104   CO2 mmol/L 28 30 27   < > 30   BUN mg/dL 36* 36* 36*   < > 44*   CREATININE mg/dL 1.94* 2.21* 1.97*   < > 2.35*   CALCIUM mg/dL 7.3* 8.2* 8.0*   < > 8.5   ALK PHOS U/L 56 67  --   --  61   ALT U/L 10 12  --   --  16   AST U/L 15 18  --   --  21    < > = values in this interval not displayed.     Lab Results   Component Value Date    TROPONINI <0.02 12/19/2019    TROPONINI <0.02 12/19/2019    TROPONINI 0.05 (H) 12/19/2019    CKTOTAL 78 11/23/2018     Results from last 7 days   Lab Units 12/08/24  0431 12/07/24  1019 12/03/24  1702   INR  1.02 1.30* 0.89     No results found for: \"BLOODCX\", \"URINECX\", \"WOUNDCULT\", \"SPUTUMCULTUR\"      Imaging:  Results for orders placed during the hospital encounter of 12/03/24    XR chest 1 view portable    Narrative  XR CHEST PORTABLE    INDICATION: sob.    COMPARISON: 12/19/2019    FINDINGS:    Clear lungs. No pneumothorax or pleural effusion.    Normal cardiomediastinal " silhouette.    Bones are unremarkable for age.    Normal upper abdomen.    Impression  No acute cardiopulmonary disease.        Workstation performed: [id number]    Results for orders placed during the hospital encounter of 12/19/19    XR chest 2 views    Narrative  CHEST    INDICATION:   Chest Pain.    COMPARISON:  May 31, 2013    EXAM PERFORMED/VIEWS:  XR CHEST PA & LATERAL  The frontal view was performed utilizing dual energy radiographic technique.      FINDINGS:    Cardiomediastinal silhouette appears unremarkable.    The lungs are clear.  No pneumothorax or pleural effusion.    Osseous structures appear within normal limits for patient age.    Impression  No acute cardiopulmonary disease.        Workstation performed: [id number]      Last 24 Hours Medication List:   Current Facility-Administered Medications   Medication Dose Route Frequency Provider Last Rate    acetaminophen  650 mg Oral Q6H PRN Antonio Nguyen MD      albuterol  2.5 mg Nebulization Q4H PRN Antonio Nugyen MD      docusate sodium  100 mg Oral BID Antonio Nguyen MD      DULoxetine  20 mg Oral Daily Antonio Nguyen MD      famotidine  20 mg Oral Daily Antonio Nguyen MD      heparin (porcine)  3-30 Units/kg/hr (Order-Specific) Intravenous Titrated Antonio Nguyen MD 16 Units/kg/hr (12/07/24 2240)    heparin (porcine)  3,600 Units Intravenous Q6H PRN Antonio Nguyen MD      heparin (porcine)  7,200 Units Intravenous Q6H PRN Antonio Nguyen MD      levothyroxine  100 mcg Oral Daily Antonio Nguyen MD      losartan  25 mg Oral Daily Antonio Nguyen MD      ondansetron  4 mg Intravenous Q6H PRN Antonio Nguyen MD      predniSONE  2.5 mg Oral Daily Antonio Nguyen MD      senna  8.6 mg Oral HS Antonio Nguyen MD      sulfamethoxazole-trimethoprim  1 tablet Oral Daily Antonio Nguyen MD      tadalafil  5 mg Oral Daily Antonio Nguyen MD      torsemide  100 mg Oral Daily Antonio Nguyen MD      traMADol  50 mg Oral  Q6H PRN Antonio Nguyen MD          Today, Patient Was Seen By: Antonio Nguyen MD    ** Please Note: Dictation voice to text software may have been used in the creation of this document. **

## 2024-12-08 NOTE — ASSESSMENT & PLAN NOTE
Due to biopsy-proven minimal-change disease   initially on IV steroids for induction.  Discharged from [facility] on October 21 on prednisone 60 mg daily.  Now followed by Dr. Vazquez for management in addition to Dr. Kel Isaac at [facility]  On prednisone taper currently 2.5 mg daily which started on 12/3/2024  Last UPC ratio 6.2 g  Hold losartan temporarily as above to make sure no contrast nephropathy  Likely starting Rituxan as an outpatient

## 2024-12-08 NOTE — ASSESSMENT & PLAN NOTE
Baseline creatinine 0.95 as of June 2024  Recent hospital admission at [facility] for nephrotic syndrome.  Renal biopsy revealed minimal-change disease.  Currently on steroids/steroid taper  During recent hospitalization in October required short-term dialysis with subsequent recovery after approximately 2 weeks of dialysis.  Creatinine now has stabilized around 2 and is 1.94 today  He did have CT with contrast yesterday to rule out PE and received some IV fluid for renal prep  Hold losartan temporarily  Can continue home torsemide 100 mg daily

## 2024-12-08 NOTE — ASSESSMENT & PLAN NOTE
"Presented with pleuritic chest pain and shortness of breath  Admitted with acute pulmonary embolism  CT chest PE with contrast study showed-\"Acute segmental pulmonary embolus in the posterior basilar segment pulmonary artery of the right lower lobe. Overall clot burden is small.Measured RV/LV ratio is within normal limits at less than 0.9.Possible partially imaged deep venous thrombosis within the proximal left superficial femoral vein. Correlation with venous Dopplers of the lower extremities is advised.\"  Troponin and BNP are normal  Echocardiogram on 12/4 showed LVEF 55% with grade 1 diastolic dysfunction.   Patient had VQ scan on 12/3-showed low probability of PE. Diminished ventilation with preserved perfusion of the left lung base, findings could be related to developing airspace disease or mucous plugging.   Patient was taking loading dose of Eliquis 10 mg twice daily  ER ordered heparin drip.  Continue heparin drip  Pulmonology consult appreciated  Will repeat bilateral venous duplex lower extremity given CT showing concern for DVT in proximal left superficial femoral vein  Monitor vitals closely  "

## 2024-12-08 NOTE — ASSESSMENT & PLAN NOTE
Recent diagnosis. Following with [facility]. Scheduled to start Rituxan on 12/10.   Continue outpatient follow-up at [facility]

## 2024-12-08 NOTE — PLAN OF CARE
Problem: PAIN - ADULT  Goal: Verbalizes/displays adequate comfort level or baseline comfort level  Description: Interventions:  - Encourage patient to monitor pain and request assistance  - Assess pain using appropriate pain scale  - Administer analgesics based on type and severity of pain and evaluate response  - Implement non-pharmacological measures as appropriate and evaluate response  - Consider cultural and social influences on pain and pain management  - Notify physician/advanced practitioner if interventions unsuccessful or patient reports new pain  Outcome: Progressing     Problem: INFECTION - ADULT  Goal: Absence or prevention of progression during hospitalization  Description: INTERVENTIONS:  - Assess and monitor for signs and symptoms of infection  - Monitor lab/diagnostic results  - Monitor all insertion sites, i.e. indwelling lines, tubes, and drains  - Monitor endotracheal if appropriate and nasal secretions for changes in amount and color  - [place] appropriate cooling/warming therapies per order  - Administer medications as ordered  - Instruct and encourage patient and family to use good hand hygiene technique  - Identify and instruct in appropriate isolation precautions for identified infection/condition  Outcome: Progressing  Goal: Absence of fever/infection during neutropenic period  Description: INTERVENTIONS:  - Monitor WBC    Outcome: Progressing     Problem: SAFETY ADULT  Goal: Patient will remain free of falls  Description: INTERVENTIONS:  - Educate patient/family on patient safety including physical limitations  - Instruct patient to call for assistance with activity   - Consult OT/PT to assist with strengthening/mobility   - Keep Call bell within reach  - Keep bed low and locked with side rails adjusted as appropriate  - Keep care items and personal belongings within reach  - Initiate and maintain comfort rounds  - Make Fall Risk Sign visible to staff  - Offer Toileting every 2 Hours,  in advance of need  - Initiate/Maintain 2 alarms  - Obtain necessary fall risk management equipment: bed alarm on. Patient moved to room near nurses station.  - Apply yellow socks and bracelet for high fall risk patients  - Consider moving patient to room near nurses station  Outcome: Progressing  Goal: Maintain or return to baseline ADL function  Description: INTERVENTIONS:  -  Assess patient's ability to carry out ADLs; assess patient's baseline for ADL function and identify physical deficits which impact ability to perform ADLs (bathing, care of mouth/teeth, toileting, grooming, dressing, etc.)  - Assess/evaluate cause of self-care deficits   - Assess range of motion  - Assess patient's mobility; develop plan if impaired  - Assess patient's need for assistive devices and provide as appropriate  - Encourage maximum independence but intervene and supervise when necessary  - Involve family in performance of ADLs  - Assess for home care needs following discharge   - Consider OT consult to assist with ADL evaluation and planning for discharge  - Provide patient education as appropriate  Outcome: Progressing  Goal: Maintains/Returns to pre admission functional level  Description: INTERVENTIONS:  - Perform AM-PAC 6 Click Basic Mobility/ Daily Activity assessment daily.  - Set and communicate daily mobility goal to care team and patient/family/caregiver.   - Collaborate with rehabilitation services on mobility goals if consulted  - Perform Range of Motion 2 times a day.  - Reposition patient every 2 hours.  - Dangle patient 2 times a day  - Stand patient 2 times a day  - Ambulate patient 2 times a day  - Out of bed to chair 2 times a day   - Out of bed for meals 2 times a day  - Out of bed for toileting  - Record patient progress and toleration of activity level   Outcome: Progressing     Problem: HEMATOLOGIC - ADULT  Goal: Maintains hematologic stability  Description: INTERVENTIONS  - Assess for signs and symptoms of  bleeding or hemorrhage  - Monitor labs  - Administer supportive blood products/factors as ordered and appropriate  Outcome: Progressing

## 2024-12-08 NOTE — CONSULTS
Consultation - Nephrology   Angel Escoto 69 y.o. male MRN: [id number]  Unit/Bed#: -01 Encounter: [id number]    ASSESSMENT/PLAN:   Assessment & Plan  Acute kidney injury (HCC)  Baseline creatinine 0.95 as of June 2024  Recent hospital admission at [facility] for nephrotic syndrome.  Renal biopsy revealed minimal-change disease.  Currently on steroids/steroid taper  During recent hospitalization in October required short-term dialysis with subsequent recovery after approximately 2 weeks of dialysis.  Creatinine now has stabilized around 2 and is 1.94 today  He did have CT with contrast yesterday to rule out PE and received some IV fluid for renal prep  Hold losartan temporarily  Can continue home torsemide 100 mg daily  Nephrotic syndrome  Due to biopsy-proven minimal-change disease   initially on IV steroids for induction.  Discharged from [facility] on October 21 on prednisone 60 mg daily.  Now followed by Dr. Vazqeuz for management in addition to Dr. Kel Isaac at [facility]  On prednisone taper currently 2.5 mg daily which started on 12/3/2024  Last UPC ratio 6.2 g  Hold losartan temporarily as above to make sure no contrast nephropathy  Likely starting Rituxan as an outpatient  Acute pulmonary embolism (HCC)  Recently admitted earlier this week with acute DVT and discharged on Eliquis  Now admitted with acute PE  Currently on heparin drip  Likely has hypercoagulable state due to nephrotic syndrome and malignancy  Acute deep vein thrombosis (DVT) of right lower extremity (HCC)  Anticoagulation as above  Mantle cell lymphoma (HCC)  Will be starting outpatient rituximab  Anemia  Hemoglobin trending down to 7.7  Recent iron studies with ferritin 408, iron saturation 9%      Summary of Plan:   Hold losartan temporarily  Check a.m. BMP    HISTORY OF PRESENT ILLNESS:  Requesting Physician: Antonio Nguyen MD  Reason for Consult: Acute kidney injury/nephrotic syndrome/minimal-change disease    Angel  ROBY Escoto is a 69 y.o. year old male who was admitted to [facility] with acute PE.  Patient was admitted 12/3 - 12/5 with new right lower extremity DVT and discharged on Eliquis.  While at home patient developed worsening shortness of breath and pain in his back when he took a deep breath so came back to the ER.  VQ scan last admission was read as low probability of PE so he underwent a CTA in the ER this admission which did show a right PE and he was admitted and placed on a heparin drip.      Patient has a history of mantle cell lymphoma and recent acute kidney injury due to biopsy-proven minimal-change disease.  He was recently on hemodialysis which was discontinued about a month ago.  Nephrology was consulted to follow his kidney function while admitted.  Patient is currently feeling okay and denies any chest pain or shortness of breath.  He thinks his swelling is slowly getting better.      PAST MEDICAL HISTORY:  Past Medical History:   Diagnosis Date    Acute nephrotic syndrome     Disease of thyroid gland     DVT (deep venous thrombosis) (HCC)     Mantle cell lymphoma (HCC)        PAST SURGICAL HISTORY:  Past Surgical History:   Procedure Laterality Date    ABLATION OF DYSRHYTHMIC FOCUS      IR TUNNELED DIALYSIS CATHETER REMOVAL  11/13/2024       ALLERGIES:  Allergies   Allergen Reactions    Hydromorphone Hallucinations, Other (See Comments), Delirium, Confusion and Visual Disturbance     hallucinosis    Patient saw things that were not there    Other reaction(s): Delirium, Hallucinations, Mental Status Changes, Other (see comments), Visual Disturbance   hallucinosis   hallucinosis   hallucinosis   hallucinosis   Patient saw things that were not there    hallucinosis   hallucinosis   Patient saw things that were not there    hallucinosis   hallucinosis   hallucinosis   hallucinosis   Patient saw things that were not there    hallucinosis   hallucinosis   hallucinosis   hallucinosis    hallucinosis   hallucinosis   Patient saw things that were not there    hallucinosis   hallucinosis   Patient saw things that were not there    hallucinosis    Medical Tape Rash     Itches    Skin irritate with EKG leads       SOCIAL HISTORY:  Social History     Substance and Sexual Activity   Alcohol Use Yes    Comment: rare     Social History     Substance and Sexual Activity   Drug Use Never     Social History     Tobacco Use   Smoking Status Never   Smokeless Tobacco Never       FAMILY HISTORY:  History reviewed. No pertinent family history.    MEDICATIONS:  Scheduled Meds:  Current Facility-Administered Medications   Medication Dose Route Frequency Provider Last Rate    acetaminophen  650 mg Oral Q6H PRN Antonio Nguyen MD      albuterol  2.5 mg Nebulization Q4H PRN Antonio Nguyen MD      docusate sodium  100 mg Oral BID Antonio Nguyen MD      DULoxetine  20 mg Oral Daily Antonio Nguyen MD      famotidine  20 mg Oral Daily Antonio Nguyen MD      heparin (porcine)  3-30 Units/kg/hr (Order-Specific) Intravenous Titrated Antonio Nguyen MD 14 Units/kg/hr (12/08/24 0744)    heparin (porcine)  3,600 Units Intravenous Q6H PRN Antonio Nguyen MD      heparin (porcine)  7,200 Units Intravenous Q6H PRN Antonio Nguyen MD      levothyroxine  100 mcg Oral Daily Antonio Nguyen MD      [Held by provider] losartan  25 mg Oral Daily Antonio Nguyen MD      ondansetron  4 mg Intravenous Q6H PRN Antonio Nguyen MD      predniSONE  2.5 mg Oral Daily Antonio Nguyen MD      senna  8.6 mg Oral HS Antonio Nguyen MD      sulfamethoxazole-trimethoprim  1 tablet Oral Daily Antonio Nguyen MD      tadalafil  5 mg Oral Daily Antonio Nguyen MD      torsemide  100 mg Oral Daily Antonio Nguyen MD      traMADol  50 mg Oral Q6H PRN Antonio Nguyen MD         PRN Meds:.  acetaminophen    albuterol    heparin (porcine)    heparin (porcine)    ondansetron    traMADol    Continuous Infusions:heparin  (porcine), 3-30 Units/kg/hr (Order-Specific), Last Rate: 14 Units/kg/hr (12/08/24 0744)        REVIEW OF SYSTEMS:  A complete review of systems was done. Pertinent positives and negatives noted in the HPI but otherwise the review of systems is negative.    PHYSICAL EXAM:  Current Weight: Weight - Scale: 90.9 kg (200 lb 6.4 oz)  First Weight: Weight - Scale: 90.9 kg (200 lb 6.4 oz)  Vitals:    12/08/24 0921   BP: 128/71   Pulse: 72   Resp:    Temp: 98.3 °F (36.8 °C)   SpO2: 97%       Intake/Output Summary (Last 24 hours) at 12/8/2024 0934  Last data filed at 12/8/2024 0921  Gross per 24 hour   Intake 1432 ml   Output 1425 ml   Net 7 ml     General:  appears comfortable and in no acute distress   Skin:  No rash  Eyes:  Sclerae anicteric, no periorbital edema   ENT:  Moist mucous membranes  Neck:  Trachea midline, symmetric   Chest:  Clear to auscultation bilaterally with no wheezes, rales or rhonchi  CVS:  Regular rate and rhythm  Abdomen:  Soft, nontender, nondistended  Neuro:  Awake and alert  Psych:  Appropriate affect  Extremities: Bilateral lower extremity edema    Lab Results:   Results from last 7 days   Lab Units 12/08/24  0431 12/07/24  1002 12/05/24  0425 12/04/24  1151 12/04/24  0424 12/03/24  1702   WBC Thousand/uL 7.77 8.02 7.83  --  8.15 7.65   HEMOGLOBIN g/dL 7.7* 8.7* 8.6*  --  8.4* 9.0*   HEMATOCRIT % 24.6* 27.9* 26.8*  --  26.4* 28.2*   PLATELETS Thousands/uL 237 244 263  --  199 228   SODIUM mmol/L 141 140 141  --  141 138   POTASSIUM mmol/L 4.1 3.9 4.2  --  4.0 4.4   CHLORIDE mmol/L 109* 106 108  --  108 104   CO2 mmol/L 28 30 27  --  28 30   BUN mg/dL 36* 36* 36*  --  42* 44*   CREATININE mg/dL 1.94* 2.21* 1.97*  --  2.19* 2.35*   CALCIUM mg/dL 7.3* 8.2* 8.0*  --  8.1* 8.5   MAGNESIUM mg/dL 2.2  --   --   --  2.2  --    PHOSPHORUS mg/dL 3.4  --   --  4.3*  --   --        Radiology Results:   CT pe study w abdomen pelvis w contrast   Final Result by Victor M Cohi MD (12/07 1207)      Acute  segmental pulmonary embolus in the posterior basilar segment pulmonary artery of the right lower lobe. Overall clot burden is small.      Measured RV/LV ratio is within normal limits at less than 0.9.      Possible partially imaged deep venous thrombosis within the proximal left superficial femoral vein. Correlation with venous Dopplers of the lower extremities is advised.      Mildly prominent upper abdominal and retroperitoneal lymph nodes that are nonspecific and may be reactive. Follow-up CT is recommended in 3 months for this finding.      Relative dilation of the left ventricular cardiac chamber.      Small right pleural effusion with right basilar atelectasis.      Possible upper urinary tract infection involving the ureters without CT findings of pyelonephritis.      Minimal aneurysmal dilation of the aortic root measuring 4.1 cm at the sinuses of Valsalva.         I personally discussed this study with GAVIN NIXON via HIPAA compliant epic secure chat on 12/7/2024 1:30 PM.            Workstation performed: [id number]         VAS ARTERIAL DUPLEX- LOWER LIMB BILATERAL    (Results Pending)

## 2024-12-08 NOTE — ASSESSMENT & PLAN NOTE
Recently admitted earlier this week with acute DVT and discharged on Eliquis  Now admitted with acute PE  Currently on heparin drip  Likely has hypercoagulable state due to nephrotic syndrome and malignancy

## 2024-12-08 NOTE — ASSESSMENT & PLAN NOTE
Patient admitted with hemoglobin of 8.7  Discharged on 12/5 with hemoglobin of 8.6  Hemoglobin this morning is 7.7  Repeat H&H in evening  Denies hematuria, hemoptysis or known melena   Transfuse < 7  Patient's previous iron panel consistent with anemia of chronic disease.

## 2024-12-08 NOTE — ASSESSMENT & PLAN NOTE
Lab Results   Component Value Date    EGFR 34 12/08/2024    EGFR 29 12/07/2024    EGFR 33 12/05/2024    CREATININE 1.94 (H) 12/08/2024    CREATININE 2.21 (H) 12/07/2024    CREATININE 1.97 (H) 12/05/2024   Biopsy proven minimal change disease developed about 6 weeks ago. Had gained about 40 lbs of water weight. Temporary dialysis catheter placed for 2 weeks, removed about 1 month ago.   On prednisone taper. Currently on 2.5 mg.   Follows with Dr. Kel Isaac at [facility] and recently saw Dr Vazquez with [facility]   Unclear baseline at this point.    Will request nephrology consult since patient got IV contrast  Received IV fluid  Hold Cozaar.  Continue torsemide

## 2024-12-09 ENCOUNTER — TELEPHONE (OUTPATIENT)
Dept: NEPHROLOGY | Facility: CLINIC | Age: 69
End: 2024-12-09

## 2024-12-09 ENCOUNTER — APPOINTMENT (INPATIENT)
Dept: NON INVASIVE DIAGNOSTICS | Facility: HOSPITAL | Age: 69
DRG: 176 | End: 2024-12-09
Payer: COMMERCIAL

## 2024-12-09 VITALS
DIASTOLIC BLOOD PRESSURE: 78 MMHG | RESPIRATION RATE: 18 BRPM | SYSTOLIC BLOOD PRESSURE: 143 MMHG | BODY MASS INDEX: 27.14 KG/M2 | OXYGEN SATURATION: 97 % | HEART RATE: 72 BPM | TEMPERATURE: 98.4 F | WEIGHT: 200.4 LBS | HEIGHT: 72 IN

## 2024-12-09 DIAGNOSIS — N18.9 ACUTE ON CHRONIC RENAL INSUFFICIENCY: ICD-10-CM

## 2024-12-09 DIAGNOSIS — N18.9 CHRONIC KIDNEY DISEASE, UNSPECIFIED CKD STAGE: ICD-10-CM

## 2024-12-09 DIAGNOSIS — N18.4 CKD (CHRONIC KIDNEY DISEASE) STAGE 4, GFR 15-29 ML/MIN (HCC): ICD-10-CM

## 2024-12-09 DIAGNOSIS — N04.9 NEPHROTIC SYNDROME: Primary | ICD-10-CM

## 2024-12-09 DIAGNOSIS — N17.9 ACUTE KIDNEY INJURY (HCC): ICD-10-CM

## 2024-12-09 DIAGNOSIS — N28.9 ACUTE ON CHRONIC RENAL INSUFFICIENCY: ICD-10-CM

## 2024-12-09 DIAGNOSIS — E08.21 DIABETES MELLITUS DUE TO UNDERLYING CONDITION WITH DIABETIC NEPHROPATHY, WITHOUT LONG-TERM CURRENT USE OF INSULIN (HCC): ICD-10-CM

## 2024-12-09 DIAGNOSIS — N10 ACUTE INTERSTITIAL NEPHRITIS: ICD-10-CM

## 2024-12-09 PROBLEM — G44.019 EPISODIC CLUSTER HEADACHE: Status: ACTIVE | Noted: 2024-12-09

## 2024-12-09 LAB
ANION GAP SERPL CALCULATED.3IONS-SCNC: 6 MMOL/L (ref 4–13)
APTT PPP: 73 SECONDS (ref 23–34)
BASOPHILS # BLD MANUAL: 0.07 THOUSAND/UL (ref 0–0.1)
BASOPHILS NFR MAR MANUAL: 1 % (ref 0–1)
BUN SERPL-MCNC: 35 MG/DL (ref 5–25)
CALCIUM SERPL-MCNC: 7.4 MG/DL (ref 8.4–10.2)
CHLORIDE SERPL-SCNC: 109 MMOL/L (ref 96–108)
CO2 SERPL-SCNC: 27 MMOL/L (ref 21–32)
CREAT SERPL-MCNC: 1.92 MG/DL (ref 0.6–1.3)
EOSINOPHIL # BLD MANUAL: 0.29 THOUSAND/UL (ref 0–0.4)
EOSINOPHIL NFR BLD MANUAL: 4 % (ref 0–6)
ERYTHROCYTE [DISTWIDTH] IN BLOOD BY AUTOMATED COUNT: 13.7 % (ref 11.6–15.1)
GFR SERPL CREATININE-BSD FRML MDRD: 34 ML/MIN/1.73SQ M
GLUCOSE SERPL-MCNC: 87 MG/DL (ref 65–140)
HCT VFR BLD AUTO: 25.2 % (ref 36.5–49.3)
HGB BLD-MCNC: 7.9 G/DL (ref 12–17)
LYMPHOCYTES # BLD AUTO: 2.61 THOUSAND/UL (ref 0.6–4.47)
LYMPHOCYTES # BLD AUTO: 36 % (ref 14–44)
MCH RBC QN AUTO: 31.6 PG (ref 26.8–34.3)
MCHC RBC AUTO-ENTMCNC: 31.3 G/DL (ref 31.4–37.4)
MCV RBC AUTO: 101 FL (ref 82–98)
METAMYELOCYTE ABSOLUTE CT: 0.07 THOUSAND/UL (ref 0–0.1)
METAMYELOCYTES NFR BLD MANUAL: 1 % (ref 0–1)
MONOCYTES # BLD AUTO: 0.43 THOUSAND/UL (ref 0–1.22)
MONOCYTES NFR BLD: 6 % (ref 4–12)
NEUTROPHILS # BLD MANUAL: 3.76 THOUSAND/UL (ref 1.85–7.62)
NEUTS SEG NFR BLD AUTO: 52 % (ref 43–75)
PLATELET # BLD AUTO: 259 THOUSANDS/UL (ref 149–390)
PLATELET BLD QL SMEAR: ADEQUATE
PMV BLD AUTO: 8.7 FL (ref 8.9–12.7)
POTASSIUM SERPL-SCNC: 4 MMOL/L (ref 3.5–5.3)
RBC # BLD AUTO: 2.5 MILLION/UL (ref 3.88–5.62)
RBC MORPH BLD: NORMAL
SODIUM SERPL-SCNC: 142 MMOL/L (ref 135–147)
WBC # BLD AUTO: 7.24 THOUSAND/UL (ref 4.31–10.16)

## 2024-12-09 PROCEDURE — 93970 EXTREMITY STUDY: CPT | Performed by: SURGERY

## 2024-12-09 PROCEDURE — 85730 THROMBOPLASTIN TIME PARTIAL: CPT | Performed by: INTERNAL MEDICINE

## 2024-12-09 PROCEDURE — 99232 SBSQ HOSP IP/OBS MODERATE 35: CPT | Performed by: INTERNAL MEDICINE

## 2024-12-09 PROCEDURE — 80048 BASIC METABOLIC PNL TOTAL CA: CPT | Performed by: INTERNAL MEDICINE

## 2024-12-09 PROCEDURE — 93970 EXTREMITY STUDY: CPT

## 2024-12-09 PROCEDURE — 85027 COMPLETE CBC AUTOMATED: CPT | Performed by: INTERNAL MEDICINE

## 2024-12-09 PROCEDURE — 99239 HOSP IP/OBS DSCHRG MGMT >30: CPT | Performed by: STUDENT IN AN ORGANIZED HEALTH CARE EDUCATION/TRAINING PROGRAM

## 2024-12-09 PROCEDURE — 85007 BL SMEAR W/DIFF WBC COUNT: CPT | Performed by: INTERNAL MEDICINE

## 2024-12-09 PROCEDURE — 99255 IP/OBS CONSLTJ NEW/EST HI 80: CPT | Performed by: NURSE PRACTITIONER

## 2024-12-09 RX ORDER — ENOXAPARIN SODIUM 100 MG/ML
1 INJECTION SUBCUTANEOUS EVERY 12 HOURS SCHEDULED
Qty: 60 ML | Refills: 0 | Status: SHIPPED | OUTPATIENT
Start: 2024-12-09

## 2024-12-09 RX ORDER — ENOXAPARIN SODIUM 100 MG/ML
1 INJECTION SUBCUTANEOUS EVERY 12 HOURS SCHEDULED
Qty: 60 ML | Refills: 0 | Status: SHIPPED | OUTPATIENT
Start: 2024-12-09 | End: 2024-12-09

## 2024-12-09 RX ORDER — ENOXAPARIN SODIUM 100 MG/ML
1 INJECTION SUBCUTANEOUS EVERY 12 HOURS SCHEDULED
Status: DISCONTINUED | OUTPATIENT
Start: 2024-12-09 | End: 2024-12-09 | Stop reason: HOSPADM

## 2024-12-09 RX ORDER — TRAMADOL HYDROCHLORIDE 50 MG/1
50 TABLET ORAL EVERY 6 HOURS PRN
Qty: 10 TABLET | Refills: 0 | Status: SHIPPED | OUTPATIENT
Start: 2024-12-09

## 2024-12-09 RX ORDER — TORSEMIDE 20 MG/1
60 TABLET ORAL DAILY
Qty: 90 TABLET | Refills: 0 | Status: SHIPPED | OUTPATIENT
Start: 2024-12-09

## 2024-12-09 RX ADMIN — LEVOTHYROXINE SODIUM 100 MCG: 100 TABLET ORAL at 05:02

## 2024-12-09 RX ADMIN — PREDNISONE 2.5 MG: 1 TABLET ORAL at 09:37

## 2024-12-09 RX ADMIN — DOCUSATE SODIUM 100 MG: 100 CAPSULE, LIQUID FILLED ORAL at 09:42

## 2024-12-09 RX ADMIN — DULOXETINE HYDROCHLORIDE 20 MG: 20 CAPSULE, DELAYED RELEASE ORAL at 09:37

## 2024-12-09 RX ADMIN — SULFAMETHOXAZOLE AND TRIMETHOPRIM 1 TABLET: 400; 80 TABLET ORAL at 09:38

## 2024-12-09 RX ADMIN — FAMOTIDINE 20 MG: 20 TABLET, FILM COATED ORAL at 09:37

## 2024-12-09 RX ADMIN — ACETAMINOPHEN 650 MG: 325 TABLET, FILM COATED ORAL at 05:04

## 2024-12-09 RX ADMIN — TADALAFIL 5 MG: 5 TABLET ORAL at 09:38

## 2024-12-09 RX ADMIN — HEPARIN SODIUM 14 UNITS/KG/HR: 10000 INJECTION, SOLUTION INTRAVENOUS at 02:19

## 2024-12-09 RX ADMIN — ENOXAPARIN SODIUM 90 MG: 100 INJECTION SUBCUTANEOUS at 14:15

## 2024-12-09 RX ADMIN — TORSEMIDE 60 MG: 20 TABLET ORAL at 09:37

## 2024-12-09 NOTE — ASSESSMENT & PLAN NOTE
Lab Results   Component Value Date    EGFR 34 12/09/2024    EGFR 34 12/08/2024    EGFR 29 12/07/2024    CREATININE 1.92 (H) 12/09/2024    CREATININE 1.94 (H) 12/08/2024    CREATININE 2.21 (H) 12/07/2024

## 2024-12-09 NOTE — CONSULTS
Medical Oncology/Hematology Consult Note  Angel Escoto, 69 y.o., 1955  /-[birth year], [id number]  12/09/24    Assessment and Plan:    1.  Acute pulmonary embolism  Patient presented on 12/7/2024 with pleuritic chest pain and shortness of breath.  CTA PE study demonstrated an acute segmental PE in the posterior basilar segment pulmonary artery of right lower lobe.  Overall clot burden small with no evidence of heart strain.    Echocardiogram on 12/4/2024 showed LVEF 55% with grade 1 diastolic dysfunction.    Of note, patient had VQ scan done last week when diagnosed with acute DVT of RLE and this showed low probability of PE.  Patient has been compliant with Eliquis since his discharge on 12/5/2024    2. Acute DVT of right lower extremity  Patient presented last week on 12/4/2024 with right calf pain, swelling and erythema  D-dimer 14.14  VAS duplex study was positive for an acute nonocclusive thrombus in the popliteal vein, posterior tibial veins and peroneal veins of right lower limb.  No evidence of thrombus in left lower limb    Provoking risk factors include mantle cell lymphoma, nephrotic syndrome     He was anticoagulated with heparin and transitioned to Eliquis 10 mg twice daily on discharge      Recommendations:  Unclear patient's acute PE is a result of Eliquis failure.  Per radiology, They said low probability on VQ scan however there is some mild changes on the right side. Cannot definitively say that there could be a PE there on admission.   Patient has hypercoagulable state Adriel to nephrotic syndrome, underlying mantle cell lymphoma  Multidisciplinary discussion held with [facility], pulmonary and due to concerns of Eliquis failure hematology recommends discharge on Lovenox 1 mg/kg twice daily.  Lovenox is safe with creatinine clearance >30    Patient is established with hematology/oncology [facility] and has an appointment tomorrow to start treatment with Rituxan.  Will defer length of Lovenox vs  transition of Lovenox to alternative agent such as Pradaxa to his primary hematology oncology team.      3.  Mantle cell lymphoma  Follows with Dr Alves at [facility] for biopsy-proven mantle cell lymphoma  Patient has had indolent disease over the last 7 years.  Ki 67 proliferative index <10%     10/30/24 PET CT consistent with low tumor burden Mantle cell lymphoma:  1. Overall Response: PER 5: Progressive metabolic disease. Deauville score: 4  2. Compared to prior FDG PET/CT there has been a decrease in size and avidity of multiple lymph nodes seen on prior FDG PET CT has been interval enlargement of multiple pelvic and retroperitoneal lymph nodes which are mildly avid.      He is planned to start treatment with Rituxan, first dose scheduled for 12/10/2024    4.  Nephrotic syndrome  5. SALLIE  Recent hospitalization at [facility] in October and required hemodialysis for SALLIE, acute interstitial nephritis.  Underwent renal biopsy-proven minimal-change disease.  Pathology with podocytopathy with global foot process effacement, focal global glomerulosclerosis, acute interstitial nephritis, mild interstitial fibrosis. Most c/w minimal change vs FSGS   Negative for any evidence of involvement of patient's mantle cell lymphoma.     Continues on prednisone taper, current dose of prednisone currently on prednisone 2.5 mg daily.  Planned for outpatient Rituxan therapy as concerned that minimal-change disease has been exacerbated by his mantle cell lymphoma     6.  Anemia  Hemoglobin on admission stable at 8.7  Anemia workup completed last week was consistent with anemia of chronic disease        Please do not hesitate to contact me if you have any questions or need additional information. Thank you for this consult.    Aline Cain MSN, CRNP, OCN  Hematology Oncology Nurse Practitioner      Reason for Consultation: Acute PE        History of present illness:   Angel Escoto is a 69 y.o. male who presents  with pleuritic chest pain and shortness of breath.  Patient was hospitalized here last week and discharged on 12/5/2024 with a diagnosis of acute right lower extremity DVT.  He was discharged on Eliquis 10 mg twice daily and states he has been compliant with taking this.  Workup this admission demonstrates an acute PE in right lower lobe, no heart strain, low clot burden.  Currently on heparin drip and symptoms of chest pain and shortness of breath have resolved.    Patient has history of  prostate cancer status post prostatectomy 12/2022, mantle cell lymphoma that has remained indolent for 7 years, IgG kappa MGUS, peripheral neuropathy, nonischemic cardiomyopathy with first-degree AV block and frequent PVCs status post ablation, hemorrhoidal bleeding, recent diagnosis of nephrotic syndrome and acute kidney injury requiring dialysis briefly.  Renal biopsy showed minimal change disease with acute interstitial nephritis.  Currently on daily prednisone with plans to initiate treatment with Rituxan.  He follows with oncology and nephrology at [facility].  No evidence of mantle cell in the kidneys was noted.    He is planned to start Rituxan therapy tomorrow        Review of Systems  All other review of systems were negative.    Past medical history:   Past Medical History:   Diagnosis Date    Acute nephrotic syndrome     Disease of thyroid gland     DVT (deep venous thrombosis) (HCC)     Mantle cell lymphoma (HCC)        Past surgical history:   Past Surgical History:   Procedure Laterality Date    ABLATION OF DYSRHYTHMIC FOCUS      IR TUNNELED DIALYSIS CATHETER REMOVAL  11/13/2024       Allergies:   Allergies   Allergen Reactions    Hydromorphone Hallucinations, Other (See Comments), Delirium, Confusion and Visual Disturbance     hallucinosis    Patient saw things that were not there    Other reaction(s): Delirium, Hallucinations, Mental Status Changes, Other (see comments), Visual Disturbance    hallucinosis   hallucinosis   hallucinosis   hallucinosis   Patient saw things that were not there    hallucinosis   hallucinosis   Patient saw things that were not there    hallucinosis   hallucinosis   hallucinosis   hallucinosis   Patient saw things that were not there    hallucinosis   hallucinosis   hallucinosis   hallucinosis   hallucinosis   hallucinosis   Patient saw things that were not there    hallucinosis   hallucinosis   Patient saw things that were not there    hallucinosis    Medical Tape Rash     Itches    Skin irritate with EKG leads       Home medications:   Medications Prior to Admission:     acetaminophen (TYLENOL) 325 mg tablet    apixaban (ELIQUIS) 5 mg    calcium carbonate (TUMS) 500 mg chewable tablet    cholecalciferol (VITAMIN D3) 1,000 units tablet    docusate sodium (COLACE) 100 mg capsule    DULoxetine (CYMBALTA) 20 mg capsule    famotidine (PEPCID) 20 mg tablet    levothyroxine 100 mcg tablet    losartan (COZAAR) 25 mg tablet    MAGNESIUM PO    predniSONE 1 mg tablet    predniSONE 10 mg tablet    senna (SENOKOT) 8.6 mg    sulfamethoxazole-trimethoprim (BACTRIM) 400-80 mg per tablet    tadalafil (CIALIS) 5 MG tablet    [DISCONTINUED] torsemide (DEMADEX) 100 mg tablet    Hospital medications:   Current Facility-Administered Medications:     acetaminophen (TYLENOL) tablet 650 mg, 650 mg, Oral, Q6H PRN, Antonio Nguyen MD, 650 mg at 12/09/24 0504    albuterol inhalation solution 2.5 mg, 2.5 mg, Nebulization, Q4H PRN, Antonio Nguyen MD    docusate sodium (COLACE) capsule 100 mg, 100 mg, Oral, BID, Antonio Nguyen MD, 100 mg at 12/09/24 0942    DULoxetine (CYMBALTA) delayed release capsule 20 mg, 20 mg, Oral, Daily, Antonio Nguyen MD, 20 mg at 12/09/24 0937    enoxaparin (LOVENOX) subcutaneous injection 90 mg, 1 mg/kg, Subcutaneous, Q12H ANATOLIY, Henna Rojas MD    famotidine (PEPCID) tablet 20 mg, 20 mg, Oral, Daily, Antonio Nguyen MD, 20 mg at 12/09/24 0937    levothyroxine  tablet 100 mcg, 100 mcg, Oral, Daily, Antonio Nguyen MD, 100 mcg at 12/09/24 0502    [Held by provider] losartan (COZAAR) tablet 25 mg, 25 mg, Oral, Daily, Antonio Nguyen MD    ondansetron (ZOFRAN) injection 4 mg, 4 mg, Intravenous, Q6H PRN, Antonio Nguyen MD    predniSONE tablet 2.5 mg, 2.5 mg, Oral, Daily, Antonio Nguyen MD, 2.5 mg at 12/09/24 0937    senna (SENOKOT) tablet 8.6 mg, 8.6 mg, Oral, HS, Antonio Nguyen MD, 8.6 mg at 12/07/24 2219    sulfamethoxazole-trimethoprim (BACTRIM) 400-80 mg per tablet 1 tablet, 1 tablet, Oral, Daily, Antonio Nguyen MD, 1 tablet at 12/09/24 0938    tadalafil (CIALIS) tablet 5 mg, 5 mg, Oral, Daily, Antonio Nguyen MD, 5 mg at 12/09/24 0938    torsemide (DEMADEX) tablet 60 mg, 60 mg, Oral, Daily, Devonte Vazquez, DO, 60 mg at 12/09/24 0937    traMADol (ULTRAM) tablet 50 mg, 50 mg, Oral, Q6H PRN, Antonio Nguyen MD    Social history:   Social History     Tobacco Use    Smoking status: Never    Smokeless tobacco: Never   Vaping Use    Vaping status: Never Used   Substance Use Topics    Alcohol use: Yes     Comment: rare    Drug use: Never       Family history: History reviewed. No pertinent family history.    Vitals:  Vitals:    12/09/24 0940   BP: 143/78   Pulse: 72   Resp:    Temp:    SpO2: 97%       Physical Exam  Constitutional:       General: He is not in acute distress.     Appearance: He is not toxic-appearing.   HENT:      Mouth/Throat:      Pharynx: No oropharyngeal exudate.   Eyes:      General: No scleral icterus.  Cardiovascular:      Rate and Rhythm: Normal rate and regular rhythm.   Pulmonary:      Effort: Pulmonary effort is normal.      Breath sounds: Normal breath sounds.   Musculoskeletal:         General: Normal range of motion.      Cervical back: Normal range of motion.      Right lower leg: Edema present.   Neurological:      General: No focal deficit present.      Mental Status: He is alert and oriented to person, place, and time.    Psychiatric:         Mood and Affect: Mood normal.         Behavior: Behavior normal.         Recent Results (from the past 48 hours)   APTT    Collection Time: 12/07/24  9:37 PM   Result Value Ref Range    PTT 98 (H) 23 - 34 seconds   APTT    Collection Time: 12/08/24  4:31 AM   Result Value Ref Range     (H) 23 - 34 seconds   CBC and differential    Collection Time: 12/08/24  4:31 AM   Result Value Ref Range    WBC 7.77 4.31 - 10.16 Thousand/uL    RBC 2.44 (L) 3.88 - 5.62 Million/uL    Hemoglobin 7.7 (L) 12.0 - 17.0 g/dL    Hematocrit 24.6 (L) 36.5 - 49.3 %     (H) 82 - 98 fL    MCH 31.6 26.8 - 34.3 pg    MCHC 31.3 (L) 31.4 - 37.4 g/dL    RDW 13.7 11.6 - 15.1 %    MPV 8.6 (L) 8.9 - 12.7 fL    Platelets 237 149 - 390 Thousands/uL    nRBC 0 /100 WBCs    Segmented % 54 43 - 75 %    Immature Grans % 1 0 - 2 %    Lymphocytes % 31 14 - 44 %    Monocytes % 9 4 - 12 %    Eosinophils Relative 4 0 - 6 %    Basophils Relative 1 0 - 1 %    Absolute Neutrophils 4.23 1.85 - 7.62 Thousands/µL    Absolute Immature Grans 0.08 0.00 - 0.20 Thousand/uL    Absolute Lymphocytes 2.43 0.60 - 4.47 Thousands/µL    Absolute Monocytes 0.68 0.17 - 1.22 Thousand/µL    Eosinophils Absolute 0.28 0.00 - 0.61 Thousand/µL    Basophils Absolute 0.07 0.00 - 0.10 Thousands/µL   Comprehensive metabolic panel    Collection Time: 12/08/24  4:31 AM   Result Value Ref Range    Sodium 141 135 - 147 mmol/L    Potassium 4.1 3.5 - 5.3 mmol/L    Chloride 109 (H) 96 - 108 mmol/L    CO2 28 21 - 32 mmol/L    ANION GAP 4 4 - 13 mmol/L    BUN 36 (H) 5 - 25 mg/dL    Creatinine 1.94 (H) 0.60 - 1.30 mg/dL    Glucose 91 65 - 140 mg/dL    Calcium 7.3 (L) 8.4 - 10.2 mg/dL    Corrected Calcium 8.7 8.3 - 10.1 mg/dL    AST 15 13 - 39 U/L    ALT 10 7 - 52 U/L    Alkaline Phosphatase 56 34 - 104 U/L    Total Protein 4.5 (L) 6.4 - 8.4 g/dL    Albumin 2.3 (L) 3.5 - 5.0 g/dL    Total Bilirubin 0.30 0.20 - 1.00 mg/dL    eGFR 34 ml/min/1.73sq m   Magnesium     Collection Time: 12/08/24  4:31 AM   Result Value Ref Range    Magnesium 2.2 1.9 - 2.7 mg/dL   Phosphorus    Collection Time: 12/08/24  4:31 AM   Result Value Ref Range    Phosphorus 3.4 2.3 - 4.1 mg/dL   Protime-INR    Collection Time: 12/08/24  4:31 AM   Result Value Ref Range    Protime 13.9 12.3 - 15.0 seconds    INR 1.02 0.85 - 1.19   APTT    Collection Time: 12/08/24  2:19 PM   Result Value Ref Range    PTT 82 (H) 23 - 34 seconds   Hemoglobin and hematocrit, blood    Collection Time: 12/08/24  2:19 PM   Result Value Ref Range    Hemoglobin 9.0 (L) 12.0 - 17.0 g/dL    Hematocrit 28.6 (L) 36.5 - 49.3 %   APTT    Collection Time: 12/08/24 10:12 PM   Result Value Ref Range    PTT 69 (H) 23 - 34 seconds   APTT    Collection Time: 12/09/24  5:11 AM   Result Value Ref Range    PTT 73 (H) 23 - 34 seconds   Basic metabolic panel    Collection Time: 12/09/24  5:11 AM   Result Value Ref Range    Sodium 142 135 - 147 mmol/L    Potassium 4.0 3.5 - 5.3 mmol/L    Chloride 109 (H) 96 - 108 mmol/L    CO2 27 21 - 32 mmol/L    ANION GAP 6 4 - 13 mmol/L    BUN 35 (H) 5 - 25 mg/dL    Creatinine 1.92 (H) 0.60 - 1.30 mg/dL    Glucose 87 65 - 140 mg/dL    Calcium 7.4 (L) 8.4 - 10.2 mg/dL    eGFR 34 ml/min/1.73sq m   CBC and differential    Collection Time: 12/09/24  5:11 AM   Result Value Ref Range    WBC 7.24 4.31 - 10.16 Thousand/uL    RBC 2.50 (L) 3.88 - 5.62 Million/uL    Hemoglobin 7.9 (L) 12.0 - 17.0 g/dL    Hematocrit 25.2 (L) 36.5 - 49.3 %     (H) 82 - 98 fL    MCH 31.6 26.8 - 34.3 pg    MCHC 31.3 (L) 31.4 - 37.4 g/dL    RDW 13.7 11.6 - 15.1 %    MPV 8.7 (L) 8.9 - 12.7 fL    Platelets 259 149 - 390 Thousands/uL   Manual Differential(PHLEBS Do Not Order)    Collection Time: 12/09/24  5:11 AM   Result Value Ref Range    Segmented % 52 43 - 75 %    Lymphocytes % 36 14 - 44 %    Monocytes % 6 4 - 12 %    Eosinophils % 4 0 - 6 %    Basophils % 1 0 - 1 %    Metamyelocytes % 1 0 - 1 %    Absolute Neutrophils 3.76 1.85 - 7.62  Thousand/uL    Absolute Lymphocytes 2.61 0.60 - 4.47 Thousand/uL    Absolute Monocytes 0.43 0.00 - 1.22 Thousand/uL    Absolute Eosinophils 0.29 0.00 - 0.40 Thousand/uL    Absolute Basophils 0.07 0.00 - 0.10 Thousand/uL    Absolute Metamyelocytes 0.07 0.00 - 0.10 Thousand/uL    Total Counted      RBC Morphology Normal     Platelet Estimate Adequate Adequate       Imaging Studies:   CT pe study w abdomen pelvis w contrast  Result Date: 12/7/2024  Narrative: CT PULMONARY ANGIOGRAM OF THE CHEST AND CT ABDOMEN AND PELVIS WITH INTRAVENOUS CONTRAST INDICATION: r/o PE and pyelo. COMPARISON: CT scan of the chest dated 8/20/2018 TECHNIQUE: CT examination of the chest, abdomen and pelvis was performed. Thin section CT angiographic technique was used in the chest in order to evaluate for pulmonary embolus and coronal 3D MIP postprocessing was performed on the acquisition scanner. Multiplanar 2D reformatted images were created from the source data. This examination, like all CT scans performed in the [facility], was performed utilizing techniques to minimize radiation dose exposure, including the use of iterative reconstruction and automated exposure control. Radiation dose length product (DLP) for this visit: 1011.44 mGy-cm IV Contrast: 100 mL of iohexol (OMNIPAQUE) Enteric Contrast: Not administered. FINDINGS: CHEST PULMONARY ARTERIAL TREE: Filling defect within the pulmonary artery to the posterior basilar segment of the right lower lobe suggestive of a segmental pulmonary embolus. No other sites of pulmonary emboli. Overall clot burden is small. Measured RV/LV ratio is within normal limits at less than 0.9. LUNGS: Mild right basilar atelectasis. No lung nodule or other acute airspace consolidation. PLEURA: Small right pleural effusion. HEART/AORTA: There is mild relative dilation of the left ventricular cardiac chamber with a diameter of 5.7 cm. This could indicate a dilated cardiomyopathy. Mild left atrial  enlargement. Small amount of coronary calcification in the left anterior descending coronary. Minimal aneurysmal dilation of the aortic root measuring 4.1 cm at the sinuses of Valsalva.. MEDIASTINUM AND MERYL: Unremarkable. CHEST WALL AND LOWER NECK: Unremarkable. ABDOMEN LIVER/BILIARY TREE: Unremarkable. GALLBLADDER: Cholecystectomy SPLEEN: Unremarkable. PANCREAS: Unremarkable. ADRENAL GLANDS: Unremarkable. KIDNEYS/URETERS: Low-attenuation lesion in the upper pole of the left kidney that measures 1.9 x 2.0 cm image 49 of series 3 that probably represents a benign cyst. 9 mm low-attenuation focus along the anterior midpole the right kidney on image 85 of series 3 that probably represents a benign cyst. There is trace enhancement of the bilateral ureters that may represent upper urinary tract infection. No CT findings of pyelonephritis. STOMACH AND BOWEL: Occasional colonic diverticulosis without evidence of diverticulitis. No bowel obstruction. There is a small periampullary duodenal diverticulum without complication. APPENDIX: No findings to suggest appendicitis. ABDOMINOPELVIC CAVITY: No ascites. No pneumoperitoneum. Mildly prominent left para-aortic lymph nodes. Left periotic lymph nodes measure 1.4 x 1.5 cm image 76 of series 3. Additional left periaortic lymph node measures 1.7 x 1.7 cm image 84 of series 3. Interaortocaval lymph nodes measure up to 1.2 cm. There is a portacaval lymph node that measures 1.3 x 3.1 cm image 64 of series 3. Small lymph nodes measuring up to a centimeter in size are also noted within the gastrohepatic ligament. VESSELS: Replaced left hepatic artery from the left gastric artery. This is a normal variant. There is relative low attenuation of the left superficial femoral vein on image 205 of series 3. Deep venous thrombosis in this region is not excluded. PELVIS REPRODUCTIVE ORGANS: Status post prostatectomy. URINARY BLADDER: Unremarkable. ABDOMINAL WALL/INGUINAL REGIONS: Probable  prior bilateral inguinal hernia repair without recurrent inguinal hernia. Relative thinning of the right inferior rectus abdominous muscle. This could be a sequela of prior sports hernia. BONES: Mild degenerative change in the bilateral hips. Mild to moderate endplate degenerative change in the lumbar spine. Mild to moderate facet joint arthropathy. There is moderate degenerative disc base narrowing at L3-L4. Mild degenerative disc base narrowing at L4-L5. Small posterior disc protrusions from L3-L4 through L5-S1. Mild bilateral neuroforaminal narrowing at L5-S1.     Impression: Acute segmental pulmonary embolus in the posterior basilar segment pulmonary artery of the right lower lobe. Overall clot burden is small. Measured RV/LV ratio is within normal limits at less than 0.9. Possible partially imaged deep venous thrombosis within the proximal left superficial femoral vein. Correlation with venous Dopplers of the lower extremities is advised. Mildly prominent upper abdominal and retroperitoneal lymph nodes that are nonspecific and may be reactive. Follow-up CT is recommended in 3 months for this finding. Relative dilation of the left ventricular cardiac chamber. Small right pleural effusion with right basilar atelectasis. Possible upper urinary tract infection involving the ureters without CT findings of pyelonephritis. Minimal aneurysmal dilation of the aortic root measuring 4.1 cm at the sinuses of Valsalva. I personally discussed this study with GAVIN NIXON via HIPAA compliant epic secure chat on 12/7/2024 1:30 PM. Workstation performed: [id number]     NM lung ventilation / perfusion  Result Date: 12/4/2024  Narrative: VENTILATION AND PERFUSION SCAN INDICATION: Shortness of breath and history of deep vein thrombosis. COMPARISON:  Chest radiograph 12/3/2024 TECHNIQUE:  Posterior ventilation imaging was performed after the inhalation of 9.4 mCi Xe-133 gas.  Multiplanar perfusion imaging was next performed  following the intravenous administration of 3.8 mCi Tc-99m labeled MAA. FINDINGS: Ventilation imaging demonstrates diminished ventilation in the left lower lung field of unclear etiology. Ventilation on the right appears unremarkable. Perfusion imaging demonstrates greater perfusion at the left base than ventilation. Diffuse slightly heterogeneous pattern of perfusion is noted without evidence of a segmental perfusion defect.     Impression: The probability for pulmonary embolus is low. Etiology for diminished ventilation with preservation of perfusion at the left lung base is unclear. Findings could be related to developing airspace disease or mucous plugging. If clinically indicated, consider noncontrast CT of the chest for further assessment in this patient with history of nephrotic syndrome/SALLIE. Workstation performed: [id number]     VAS VENOUS DUPLEX -LOWER LIMB UNILATERAL  Result Date: 12/4/2024  Narrative:  [facility] REPORT CLINICAL: Indications: Patient presents with right lower extremity pain and swelling that started today. Operative History: No prior cardiovascular surgeries  FINDINGS:  Right       Thrombus  Popliteal   Acute     PostTibial  Acute     Peroneal    Acute        CONCLUSION:  Impression: RIGHT LOWER LIMB Evaluation shows acute non-occlusive thrombosis in the popliteal vein, posterior tibial veins, and peroneal veins. No evidence of superficial thrombophlebitis noted. Doppler evaluation shows a normal response to augmentation maneuvers. Popliteal, posterior tibial and anterior tibial arterial Doppler waveforms are triphasic.  LEFT LOWER LIMB LIMITED Evaluation shows no evidence of thrombus in the common femoral vein. Doppler evaluation shows a normal response to augmentation maneuvers.  Technical findings were given to Dr. Dorantes on 12/3/2024 @ 1830.  SIGNATURE: Electronically Signed by: BISI SYKES MD on 2024-12-04 02:49:23 PM    Echo follow up/limited w/ contrast if  indicated  Result Date: 12/4/2024  Narrative:   Left Ventricle: Left ventricular cavity size is mildly dilated. Wall thickness is mildly increased. There is concentric remodeling. The left ventricular ejection fraction is 55%. Systolic function is normal. Global longitudinal strain is mildly reduced at -17%. Wall motion is normal. Diastolic function is mildly abnormal, consistent with grade I (abnormal) relaxation.   Right Ventricle: Right ventricular cavity size is normal. Systolic function is normal.   Left Atrium: The atrium is normal in size.   Right Atrium: The atrium is normal in size.   Aorta: The aortic root is mildly dilated. The ascending aorta is normal in size. The aortic root is 4.60 cm.   Prior TTE study available for comparison. Prior study date: 6/1/2021. No significant changes noted compared to the prior study. Strain was performed to quantify interventricular dyssynchrony and evaluate components of myocardial function due to LVH. Results from the utilization of Strain Analysis are listed in the report below.     XR chest 1 view portable  Result Date: 12/4/2024  Narrative: XR CHEST PORTABLE INDICATION: sob. COMPARISON: 12/19/2019 FINDINGS: Clear lungs. No pneumothorax or pleural effusion. Normal cardiomediastinal silhouette. Bones are unremarkable for age. Normal upper abdomen.     Impression: No acute cardiopulmonary disease. Workstation performed: [id number]     IR tunneled dialysis catheter removal  Result Date: 11/13/2024  Narrative: Perma-Cath removal. Clinical History: Patient presenting for Perma-Cath removal. The existing catheter was prepped and draped in the usual sterile fashion. Lidocaine was administered the skin and subcutaneous tissues. The cuff was dissected free, and the catheter was removed. Manual compression was applied to the puncture site and tunnel until hemostasis was achieved. The patient tolerated the procedure well and suffered no complications.     Impression: Impression:  Successful Perma-Cath removal as described. Workstation performed: [id number]       Counseling / Coordination of Care  Total floor / unit time spent today 60 minutes. Greater than 50% of total time was spent with the patient and / or family counseling and / or coordination of care.     RAY Nash    Please note:  This report has been generated by voice recognition software system. Therefore, there may be syntax, spelling and/or grammatical errors.  Please call if you have any questions.

## 2024-12-09 NOTE — ASSESSMENT & PLAN NOTE
Lab Results   Component Value Date    EGFR 34 12/09/2024    EGFR 34 12/08/2024    EGFR 29 12/07/2024    CREATININE 1.92 (H) 12/09/2024    CREATININE 1.94 (H) 12/08/2024    CREATININE 2.21 (H) 12/07/2024   Biopsy proven minimal change disease developed about 6 weeks ago. Had gained about 40 lbs of water weight. Temporary dialysis catheter placed for 2 weeks, removed about 1 month ago.   On prednisone taper. Currently on 2.5 mg.   Follows with Dr. Kel Isaac at [facility] and recently saw Dr Vazquez with [facility]   Unclear baseline at this point.    Will request nephrology consult since patient got IV contrast  Received IV fluid  Hold Cozaar.  Continue torsemide

## 2024-12-09 NOTE — PROGRESS NOTES
Progress Note - Pulmonology   Name: Angel Escoto 69 y.o. male I MRN: [id number]  Unit/Bed#: -01 I Date of Admission: 12/7/2024   Date of Service: 12/9/2024 I Hospital Day: 2     Assessment & Plan  Acute pulmonary embolism (HCC)  Diagnosed DVT 12/3, CT angio RLL PE 12/7 while on abixaban  Started Apixaban 10mg BID 12/4 - denied missed doses, denied any chest pain with presentation  Very high risk for VTE given combined mantle cell lymphoma and nephrotic syndrome  Continue UFH gtt for now, follow up repeat LE US - if clot propagation and/or new DVT found would certainly consider this apixaban failure  Consider oncology consult for further systemic AC given active malignancy - to consider parental agent if renal function will allow  Acute deep vein thrombosis (DVT) of right lower extremity (HCC)  As above  Mantle cell lymphoma (HCC)    Nephrotic syndrome    Anemia    CKD (chronic kidney disease) stage 4, GFR 15-29 ml/min (HCC)  Lab Results   Component Value Date    EGFR 34 12/09/2024    EGFR 34 12/08/2024    EGFR 29 12/07/2024    CREATININE 1.92 (H) 12/09/2024    CREATININE 1.94 (H) 12/08/2024    CREATININE 2.21 (H) 12/07/2024       Subjective:   Resolved chest pain, no pleurisy currently, no increased LE pain or edema reported, no hemoptysis. He is eager for discharge to attend [facility] Rituximab infusion tomorrow    Objective:     Vitals: Blood pressure 133/71, pulse 85, temperature 98.4 °F (36.9 °C), resp. rate 18, height 6' (1.829 m), weight 90.9 kg (200 lb 6.4 oz), SpO2 95%., RA, Body mass index is 27.18 kg/m².      Intake/Output Summary (Last 24 hours) at 12/9/2024 0734  Last data filed at 12/9/2024 0511  Gross per 24 hour   Intake 1030 ml   Output 400 ml   Net 630 ml         Physical Exam  Gen: Found in bed, awake, alert, oriented x 3, no acute distress  HEENT: Mucous membranes moist, no oral lesions, no thrush  NECK: No accessory muscle use, JVP not elevated  Cardiac: Regular, single S1, single S2, no  murmurs, no rubs, no gallops  Lungs: clear, no wheeze or rales, no pleural rubs  Abdomen: normoactive bowel sounds, soft nontender, nondistended, no rebound or rigidity, no guarding  Extremities: no cyanosis, no clubbing, 2-3+ LE edema, no palpable cords or calf TTP    Labs: I have personally reviewed pertinent lab results.  Laboratory and Diagnostics  Results from last 7 days   Lab Units 12/09/24  0511 12/08/24  1419 12/08/24  0431 12/07/24  1002 12/05/24  0425 12/04/24  0424 12/03/24  1702   WBC Thousand/uL 7.24  --  7.77 8.02 7.83 8.15 7.65   HEMOGLOBIN g/dL 7.9* 9.0* 7.7* 8.7* 8.6* 8.4* 9.0*   HEMATOCRIT % 25.2* 28.6* 24.6* 27.9* 26.8* 26.4* 28.2*   PLATELETS Thousands/uL 259  --  237 244 263 199 228   SEGS PCT %  --   --  54 66  --  57 67   MONO PCT % 6  --  9 7  --  9 8   EOS PCT % 4  --  4 3  --  3 2     Results from last 7 days   Lab Units 12/09/24  0511 12/08/24  0431 12/07/24  1002 12/05/24  0425 12/04/24  0424 12/03/24  1702   SODIUM mmol/L 142 141 140 141 141 138   POTASSIUM mmol/L 4.0 4.1 3.9 4.2 4.0 4.4   CHLORIDE mmol/L 109* 109* 106 108 108 104   CO2 mmol/L 27 28 30 27 28 30   ANION GAP mmol/L 6 4 4 6 5 4   BUN mg/dL 35* 36* 36* 36* 42* 44*   CREATININE mg/dL 1.92* 1.94* 2.21* 1.97* 2.19* 2.35*   CALCIUM mg/dL 7.4* 7.3* 8.2* 8.0* 8.1* 8.5   GLUCOSE RANDOM mg/dL 87 91 111 89 88 102   ALT U/L  --  10 12  --   --  16   AST U/L  --  15 18  --   --  21   ALK PHOS U/L  --  56 67  --   --  61   ALBUMIN g/dL  --  2.3* 2.6*  --   --  2.8*   TOTAL BILIRUBIN mg/dL  --  0.30 0.38  --   --  0.48     Results from last 7 days   Lab Units 12/08/24  0431 12/04/24  1151 12/04/24  0424   MAGNESIUM mg/dL 2.2  --  2.2   PHOSPHORUS mg/dL 3.4 4.3*  --       Results from last 7 days   Lab Units 12/09/24  0511 12/08/24  2212 12/08/24  1419 12/08/24  0431 12/07/24  2137 12/07/24  1019 12/04/24  0907 12/04/24  0127 12/03/24  1702   INR   --   --   --  1.02  --  1.30*  --   --  0.89   PTT seconds 73* 69* 82* 107* 98* 31 68*   <  > 25    < > = values in this interval not displayed.              Results from last 7 days   Lab Units 12/04/24  0424   FERRITIN ng/mL 408*     Results from last 7 days   Lab Units 12/04/24  0424   LD U/L 175         Results from last 7 days   Lab Units 12/07/24  1019 12/03/24  1702   D-DIMER QUANTITATIVE ug/ml FEU 3.54* 14.14*       Microbiology:  none    Imaging and other studies: Results Review Statement: I personally reviewed the following image studies in PACS and associated radiology reports: CT chest and Ultrasound(s). My interpretation of the radiology images/reports is:  .  CT angio 12/7/2024 acute right lower lobe basilar segmental PE, RV/LV ratio <0.9, abdominal adenopathy, small right basilar effusion    V/Q 12/4/2024 - low prob V/Q    LE US 12/3/2024 - acute RLE DVT    Jackson Avila, DO, FACP  [facility]

## 2024-12-09 NOTE — ASSESSMENT & PLAN NOTE
"Presented with pleuritic chest pain and shortness of breath  Admitted with acute pulmonary embolism  CT chest PE with contrast study showed-\"Acute segmental pulmonary embolus in the posterior basilar segment pulmonary artery of the right lower lobe. Overall clot burden is small.Measured RV/LV ratio is within normal limits at less than 0.9.Possible partially imaged deep venous thrombosis within the proximal left superficial femoral vein. Correlation with venous Dopplers of the lower extremities is advised.\"  Troponin and BNP are normal  Echocardiogram on 12/4 showed LVEF 55% with grade 1 diastolic dysfunction.   Patient had VQ scan on 12/3-showed low probability of PE. Diminished ventilation with preserved perfusion of the left lung base, findings could be related to developing airspace disease or mucous plugging.   Patient was taking loading dose of Eliquis 10 mg twice daily  Heparin gtt dc'd and transitioned to lovenox 1mg/kg bid  BL LE dopplers   Monitor vitals closely    F/u with pulm outpt  "

## 2024-12-09 NOTE — ASSESSMENT & PLAN NOTE
Recently admitted earlier this week with acute DVT and discharged on Eliquis.  Now admitted with acute PE.  Currently on heparin drip.  Likely has hypercoagulable state due to nephrotic syndrome and malignancy.

## 2024-12-09 NOTE — ASSESSMENT & PLAN NOTE
Patient diagnosed with DVT with recent admission from 12/3 to 12/5/24  Venous duplex on 12/3  Right lower limb Evaluation shows acute non-occlusive thrombosis in the popliteal vein, posterior tibial veins, and peroneal veins.  Was discharged on Eliquis 10 mg twice daily for 7 days, will continue with 5 mg twice daily afterwards.

## 2024-12-09 NOTE — PLAN OF CARE
Problem: PAIN - ADULT  Goal: Verbalizes/displays adequate comfort level or baseline comfort level  Description: Interventions:  - Encourage patient to monitor pain and request assistance  - Assess pain using appropriate pain scale  - Administer analgesics based on type and severity of pain and evaluate response  - Implement non-pharmacological measures as appropriate and evaluate response  - Consider cultural and social influences on pain and pain management  - Notify physician/advanced practitioner if interventions unsuccessful or patient reports new pain  Outcome: Progressing     Problem: INFECTION - ADULT  Goal: Absence or prevention of progression during hospitalization  Description: INTERVENTIONS:  - Assess and monitor for signs and symptoms of infection  - Monitor lab/diagnostic results  - Monitor all insertion sites, i.e. indwelling lines, tubes, and drains  - Monitor endotracheal if appropriate and nasal secretions for changes in amount and color  - [place] appropriate cooling/warming therapies per order  - Administer medications as ordered  - Instruct and encourage patient and family to use good hand hygiene technique  - Identify and instruct in appropriate isolation precautions for identified infection/condition  Outcome: Progressing  Goal: Absence of fever/infection during neutropenic period  Description: INTERVENTIONS:  - Monitor WBC    Outcome: Progressing     Problem: SAFETY ADULT  Goal: Patient will remain free of falls  Description: INTERVENTIONS:  - Educate patient/family on patient safety including physical limitations  - Instruct patient to call for assistance with activity   - Consult OT/PT to assist with strengthening/mobility   - Keep Call bell within reach  - Keep bed low and locked with side rails adjusted as appropriate  - Keep care items and personal belongings within reach  - Initiate and maintain comfort rounds  - Make Fall Risk Sign visible to staff  - Offer Toileting every 2 Hours,  in advance of need  - Initiate/Maintain 2 alarms  - Obtain necessary fall risk management equipment: bed alarm on. Patient moved to room near nurses station.  - Apply yellow socks and bracelet for high fall risk patients  - Consider moving patient to room near nurses station  Outcome: Progressing  Goal: Maintain or return to baseline ADL function  Description: INTERVENTIONS:  -  Assess patient's ability to carry out ADLs; assess patient's baseline for ADL function and identify physical deficits which impact ability to perform ADLs (bathing, care of mouth/teeth, toileting, grooming, dressing, etc.)  - Assess/evaluate cause of self-care deficits   - Assess range of motion  - Assess patient's mobility; develop plan if impaired  - Assess patient's need for assistive devices and provide as appropriate  - Encourage maximum independence but intervene and supervise when necessary  - Involve family in performance of ADLs  - Assess for home care needs following discharge   - Consider OT consult to assist with ADL evaluation and planning for discharge  - Provide patient education as appropriate  Outcome: Progressing  Goal: Maintains/Returns to pre admission functional level  Description: INTERVENTIONS:  - Perform AM-PAC 6 Click Basic Mobility/ Daily Activity assessment daily.  - Set and communicate daily mobility goal to care team and patient/family/caregiver.   - Collaborate with rehabilitation services on mobility goals if consulted  - Perform Range of Motion 2 times a day.  - Reposition patient every 2 hours.  - Dangle patient 2 times a day  - Stand patient 2 times a day  - Ambulate patient 2 times a day  - Out of bed to chair 2 times a day   - Out of bed for meals 2 times a day  - Out of bed for toileting  - Record patient progress and toleration of activity level   Outcome: Progressing

## 2024-12-09 NOTE — ASSESSMENT & PLAN NOTE
"Presented with pleuritic chest pain and shortness of breath  Admitted with acute pulmonary embolism  CT chest PE with contrast study showed-\"Acute segmental pulmonary embolus in the posterior basilar segment pulmonary artery of the right lower lobe. Overall clot burden is small.Measured RV/LV ratio is within normal limits at less than 0.9.Possible partially imaged deep venous thrombosis within the proximal left superficial femoral vein. Correlation with venous Dopplers of the lower extremities is advised.\"  Troponin and BNP are normal  Echocardiogram on 12/4 showed LVEF 55% with grade 1 diastolic dysfunction.   Patient had VQ scan on 12/3-showed low probability of PE. Diminished ventilation with preserved perfusion of the left lung base, findings could be related to developing airspace disease or mucous plugging.   Patient was taking loading dose of Eliquis 10 mg twice daily  Heparin gtt dc'd and transitioned to lovenox 1mg/kg bid  BL LE dopplers RIGHT LOWER LIMB:  There is evidence of acute non-occlusive thrombus within the popliteal vein and  paired posterior tibial veins  Oncology consulted and do not believe this is eliquis failure however given multiple reasons for hypercoagulable state and is best to transition to Lovenox 1 mg/kg twice daily    F/u with pulm outpt, will be discharged on lovenox 1mg/kg bid  "

## 2024-12-09 NOTE — ASSESSMENT & PLAN NOTE
Lab Results   Component Value Date    EGFR 34 12/09/2024    EGFR 34 12/08/2024    EGFR 29 12/07/2024    CREATININE 1.92 (H) 12/09/2024    CREATININE 1.94 (H) 12/08/2024    CREATININE 2.21 (H) 12/07/2024   Biopsy proven minimal change disease developed about 6 weeks ago. Had gained about 40 lbs of water weight. Temporary dialysis catheter placed for 2 weeks, removed about 1 month ago.   On prednisone taper. Currently on 2.5 mg.   Follows with Dr. Kel Isaac at [facility] and recently saw Dr Vazquez with [facility]   Unclear baseline at this point.    Will request nephrology consult since patient got IV contrast  Continue torsemide  Resumed cozaar on discharge    F/u with nephro outpt

## 2024-12-09 NOTE — ASSESSMENT & PLAN NOTE
Due to biopsy-proven minimal-change disease.   Initially on IV steroids for induction.  Discharged from [facility] on October 21 on prednisone 60 mg daily.  Now followed by Dr. Vazquez for management in addition to Dr. Kel Isaac at [facility].  On prednisone taper currently 2.5 mg daily which started on 12/3/2024.  Last UPC ratio 6.2 g.  May resume Losartan at discharge.  Likely starting Rituxan as an outpatient.

## 2024-12-09 NOTE — UTILIZATION REVIEW
NOTIFICATION OF INPATIENT ADMISSION   AUTHORIZATION REQUEST   SERVICING FACILITY:   [place] [address]  Tax ID: [id number]  NPI: [id number] ATTENDING PROVIDER:  Attending Name and NPI#: Henna Rojas Md [[id number]]  Address: [address] [place] [address]  Phone: [phone]   ADMISSION INFORMATION:  Place of Service: Inpatient [facility]  Place of Service Code: 21  Inpatient Admission Date/Time: 12/7/24  1:42 PM  Discharge Date/Time: No discharge date for patient encounter.  Admitting Diagnosis Code/Description:  Shortness of breath [R06.02]  Pulmonary embolism (HCC) [I26.99]  SALLIE (acute kidney injury) (HCC) [N17.9]     UTILIZATION REVIEW CONTACT:  Virgen Rose, Utilization   Network Utilization Review Department  Phone: [phone]  Fax: [phone]  Email: [email or web address]  Contact for approvals/pending authorizations, clinical reviews, and discharge.     PHYSICIAN ADVISORY SERVICES:  Medical Necessity Denial & Zdkw-[id number]-Immt Review  Phone: [phone]  Fax: [phone]  Email: [email or web address]     DISCHARGE SUPPORT TEAM:  For Patients Discharge Needs & Updates  Phone: [phone] opt. 2 Fax: [phone]  Email: [email or web address]

## 2024-12-09 NOTE — UTILIZATION REVIEW
"    Initial Clinical Review    Admission: Date/Time/Statement:   Admission Orders (From admission, onward)       Ordered        12/07/24 1342  INPATIENT ADMISSION  Once                          Orders Placed This Encounter   Procedures    INPATIENT ADMISSION     Standing Status:   Standing     Number of Occurrences:   1     Level of Care:   Med Surg [16]     Estimated length of stay:   More than 2 Midnights     Certification:   I certify that inpatient services are medically necessary for this patient for a duration of greater than two midnights. See H&P and MD Progress Notes for additional information about the patient's course of treatment.     ED Arrival Information       Expected   -    Arrival   12/7/2024 09:37    Acuity   Emergent              Means of arrival   Walk-In    Escorted by   Family Member    Service   Hospitalist    Admission type   Emergency              Arrival complaint   Shortness of breath             Chief Complaint   Patient presents with    Shortness of Breath     Pt states \"Last night I started to have some sob and it really hurts in my back when I take a deep breath.\" Denies any cp. Recently here for DVT       Initial Presentation: 69 y.o. male presents to ed from home 12-7-24 for ems for evaluation and treatment of shortness of breath and back pain when I take a deep breath.  Discharged on 12-5 on eliquis on RLE DVT.    PMHX:  RLE DVT on eliquis, CKD4, anemia, mantel cell lymphoma scheduled to start rituxan on 12/10.  Clinical assessment significant for right posterior rib cage / upper flank pain persists, worse with deep inspiration, BLLE edema .   Echocardiogram on 12/4 showed LVEF 55% with grade 1 diastolic dysfunction.  Patient had VQ scan on 12/3-showed low probability of PE. Diminished ventilation with preserved perfusion of the left lung base, findings could be related to developing airspace disease or mucous plugging.  NOW:   D-dimer 3.54, GFR 29.   Imaging shows acute PE in RLL. " Small clot burden. No heart strain.  On eliquis.  Initially treated with iv heparin, po tylenol, iv .9% ns bolus.  Admit to inpatient med surg for acute PE.  Plan includes:  iv heparin gtt.     Consult pulmonology completed:  re assessed prior V Q scan.  There does appear to be a defect in that area.  That being said, he has suggestion of a new clot in his left leg which was not there on dopplers last admission.  Recommend heparin gtt and repeat dopplers if he has new DVT we will need to consider this a failure of eliquis, otherwise resume his eliquis restarting 10 mg daily for 7 days before decreasing to 5 mg twice daily.  He is a high risk factor for PE given recent hospitalization and likely nephrotic syndrome and cancer.      Date: 12-8-24    Day 2: inpatient med surg  Nephrology consult:  SALLIE now off dialysis- creatinine stable around 2.  Baseline creatinine 0.95.  Recently hospitalized at [facility] for nephrotic syndrome. Leslie biopsy revealed minimal change disease.   Continue torsemide, hold losartan temporarily.  Cr 1.94 today.  Continue prednisone taper.   New onset headaches - started on bactrim which could be contributing.  Hb 9.0> 7.9.  Continue heparin gtt.  Follow up RBC morphology reflex test, manual differential.        ED Treatment-Medication Administration from 12/07/2024 0937 to 12/07/2024 1414         Date/Time Order Dose Route Action     12/07/2024 1005 sodium chloride 0.9 % bolus 500 mL 500 mL Intravenous New Bag     12/07/2024 1021 acetaminophen (TYLENOL) tablet 975 mg 975 mg Oral Given            Scheduled Medications:  docusate sodium, 100 mg, Oral, BID  DULoxetine, 20 mg, Oral, Daily  famotidine, 20 mg, Oral, Daily  levothyroxine, 100 mcg, Oral, Daily  [Held by provider] losartan, 25 mg, Oral, Daily  predniSONE, 2.5 mg, Oral, Daily  senna, 8.6 mg, Oral, HS  sulfamethoxazole-trimethoprim, 1 tablet, Oral, Daily  tadalafil, 5 mg, Oral, Daily  torsemide, 60 mg, Oral, Daily      Continuous IV  Infusions:  heparin (porcine), 3-30 Units/kg/hr (Order-Specific), Intravenous, Titrated      PRN Meds:  acetaminophen, 650 mg, Oral, Q6H PRN  albuterol, 2.5 mg, Nebulization, Q4H PRN  heparin (porcine), 3,600 Units, Intravenous, Q6H PRN  heparin (porcine), 7,200 Units, Intravenous, Q6H PRN  ondansetron, 4 mg, Intravenous, Q6H PRN  traMADol, 50 mg, Oral, Q6H PRN      ED Triage Vitals [12/07/24 0947]   Temperature Pulse Respirations Blood Pressure SpO2 Pain Score   97.7 °F (36.5 °C) 79 20 149/68 97 % 5     Weight (last 2 days)       Date/Time Weight    12/07/24 1416 90.9 (200.4)            Vital Signs (last 3 days)       Date/Time Temp Pulse Resp BP MAP (mmHg) SpO2 O2 Device Pain    12/09/24 09:40:07 -- 72 -- 143/78 100 97 % -- --    12/09/24 0751 -- -- -- -- -- -- None (Room air) No Pain    12/09/24 0504 -- -- -- -- -- -- -- 3    12/08/24 2300 -- -- -- -- -- -- -- No Pain    12/08/24 2145 -- -- -- -- -- -- None (Room air) --    12/08/24 16:38:57 98.4 °F (36.9 °C) 85 -- 133/71 92 95 % -- --    12/08/24 09:21:44 98.3 °F (36.8 °C) 72 -- 128/71 90 97 % -- --    12/08/24 0900 -- -- -- -- -- -- -- No Pain    12/08/24 0443 -- -- -- -- -- -- -- 4    12/07/24 2200 -- 76 -- -- -- 95 % None (Room air) --    12/07/24 2145 -- -- -- -- -- -- None (Room air) --    12/07/24 21:29:52 98.3 °F (36.8 °C) 74 -- 126/70 89 98 % -- --    12/07/24 1935 -- -- -- -- -- -- -- 3    12/07/24 18:04:10 98.6 °F (37 °C) 74 -- 126/69 88 98 % -- --    12/07/24 1500 -- -- -- -- -- -- -- No Pain    12/07/24 1455 -- -- -- -- -- -- -- No Pain    12/07/24 1415 98.4 °F (36.9 °C) 71 18 143/68 98 100 % None (Room air) --    12/07/24 1330 -- 65 11 143/68 99 98 % None (Room air) --    12/07/24 1315 -- 66 13 163/77 110 99 % None (Room air) --    12/07/24 1300 -- 67 14 163/77 -- 98 % None (Room air) --    12/07/24 1200 -- 69 18 138/66 95 99 % -- --    12/07/24 1130 -- 67 18 135/63 91 99 % -- --    12/07/24 1016 -- -- -- -- -- -- None (Room air) --    12/07/24 0947  97.7 °F (36.5 °C) 79 20 149/68 98 97 % None (Room air) 5              Pertinent Labs/Diagnostic Test Results:   Radiology:  CT pe study w abdomen pelvis w contrast   Final (12/07 1336)      Acute segmental pulmonary embolus in the posterior basilar segment pulmonary artery of the right lower lobe. Overall clot burden is small.      Measured RV/LV ratio is within normal limits at less than 0.9.      Possible partially imaged deep venous thrombosis within the proximal left superficial femoral vein. Correlation with venous Dopplers of the lower extremities is advised.      Mildly prominent upper abdominal and retroperitoneal lymph nodes that are nonspecific and may be reactive. Follow-up CT is recommended in 3 months for this finding.      Relative dilation of the left ventricular cardiac chamber.      Small right pleural effusion with right basilar atelectasis.      Possible upper urinary tract infection involving the ureters without CT findings of pyelonephritis.      Minimal aneurysmal dilation of the aortic root measuring 4.1 cm at the sinuses of Valsalva.          VAS VENOUS DUPLEX - LOWER LIMB BILATERAL    (Results Pending)     Cardiology:  ECG 12 lead   Final Result by Pradip Ortega MD (12/07 3004)   Sinus rhythm with 1st degree A-V block   Otherwise normal ECG   When compared with ECG of 03-Dec-2024 16:49,   No significant change was found   Confirmed by Pradip Ortega ([id number]) on 12/7/2024 3:51:55 PM        GI:  No orders to display           Results from last 7 days   Lab Units 12/09/24  0511 12/08/24  1419 12/08/24  0431 12/07/24  1002 12/05/24  0425 12/04/24  0424   WBC Thousand/uL 7.24  --  7.77 8.02 7.83 8.15   HEMOGLOBIN g/dL 7.9* 9.0* 7.7* 8.7* 8.6* 8.4*   HEMATOCRIT % 25.2* 28.6* 24.6* 27.9* 26.8* 26.4*   PLATELETS Thousands/uL 259  --  237 244 263 199   TOTAL NEUT ABS Thousands/µL  --   --  4.23 5.25  --  4.65     Results from last 7 days   Lab Units 12/04/24  0424   RETIC CT ABS  64,100  "  RETIC CT PCT % 2.42*     Results from last 7 days   Lab Units 12/09/24  0511 12/08/24  0431 12/07/24  1002 12/05/24  0425 12/04/24  1151 12/04/24  0424   SODIUM mmol/L 142 141 140 141  --  141   POTASSIUM mmol/L 4.0 4.1 3.9 4.2  --  4.0   CHLORIDE mmol/L 109* 109* 106 108  --  108   CO2 mmol/L 27 28 30 27  --  28   ANION GAP mmol/L 6 4 4 6  --  5   BUN mg/dL 35* 36* 36* 36*  --  42*   CREATININE mg/dL 1.92* 1.94* 2.21* 1.97*  --  2.19*   EGFR ml/min/1.73sq m 34 34 29 33  --  29   CALCIUM mg/dL 7.4* 7.3* 8.2* 8.0*  --  8.1*   MAGNESIUM mg/dL  --  2.2  --   --   --  2.2   PHOSPHORUS mg/dL  --  3.4  --   --  4.3*  --      Results from last 7 days   Lab Units 12/08/24  0431 12/07/24  1002 12/03/24  1702   AST U/L 15 18 21   ALT U/L 10 12 16   ALK PHOS U/L 56 67 61   TOTAL PROTEIN g/dL 4.5* 5.2* 5.4*   ALBUMIN g/dL 2.3* 2.6* 2.8*   TOTAL BILIRUBIN mg/dL 0.30 0.38 0.48         Results from last 7 days   Lab Units 12/09/24  0511 12/08/24  0431 12/07/24  1002 12/05/24  0425 12/04/24  0424 12/03/24  1702   GLUCOSE RANDOM mg/dL 87 91 111 89 88 102             No results found for: \"BETA-HYDROXYBUTYRATE\"                   Results from last 7 days   Lab Units 12/07/24  1402 12/07/24  1220 12/07/24  1002   HS TNI 0HR ng/L  --   --  13   HS TNI 2HR ng/L  --  11  --    HSTNI D2 ng/L  --  -2  --    HS TNI 4HR ng/L 11  --   --    HSTNI D4 ng/L -2  --   --      Results from last 7 days   Lab Units 12/07/24  1019 12/03/24  1702   D-DIMER QUANTITATIVE ug/ml FEU 3.54* 14.14*     Results from last 7 days   Lab Units 12/09/24  0511 12/08/24  2212 12/08/24  1419 12/08/24  0431 12/07/24  2137 12/07/24  1019 12/04/24  0127 12/03/24  1702   PROTIME seconds  --   --   --  13.9  --  16.7*  --  12.6   INR   --   --   --  1.02  --  1.30*  --  0.89   PTT seconds 73* 69* 82* 107*   < > 31   < > 25    < > = values in this interval not displayed.     Results from last 7 days   Lab Units 12/04/24  0424   TSH 3RD GENERATON uIU/mL 4.916*               "       Results from last 7 days   Lab Units 12/07/24  1002 12/03/24  1702   BNP pg/mL 42 72     Results from last 7 days   Lab Units 12/04/24  1151 12/04/24  0424   FERRITIN ng/mL  --  408*   IRON SATURATION % 39  --    IRON ug/dL 72  --    TIBC ug/dL 186*  --                                  Results from last 7 days   Lab Units 12/07/24  1038 12/04/24  1424   CLARITY UA  Clear  --    COLOR UA  Light Yellow  --    SPEC GRAV UA  1.015  --    PH UA  7.0  --    GLUCOSE UA mg/dl Negative  --    KETONES UA mg/dl Negative  --    BLOOD UA  Moderate*  --    PROTEIN UA mg/dl 100 (2+)*  --    NITRITE UA  Negative  --    BILIRUBIN UA  Negative  --    UROBILINOGEN UA (BE) mg/dl <2.0  --    LEUKOCYTES UA  Negative  --    WBC UA /hpf None Seen  --    RBC UA /hpf 4-10*  --    BACTERIA UA /hpf None Seen  --    EPITHELIAL CELLS WET PREP /hpf None Seen  --    CREATININE UR mg/dL  --  32.6   PROTEIN UR mg/dL  --  202.5   PROT/CREAT RATIO UR   --  6.2*       Past Medical History:   Diagnosis Date    Acute nephrotic syndrome     Disease of thyroid gland     DVT (deep venous thrombosis) (HCC)     Mantle cell lymphoma (HCC)      Present on Admission:     Hypothyroid   Acute deep vein thrombosis (DVT) of right lower extremity (HCC)   Mantle cell lymphoma (HCC)   CKD (chronic kidney disease) stage 4, GFR 15-29 ml/min (HCC)   Anemia   Nephrotic syndrome   Acute kidney injury (HCC)   Other proteinuria      Admitting Diagnosis:     Shortness of breath [R06.02]  Pulmonary embolism (HCC) [I26.99]  SALLIE (acute kidney injury) (HCC) [N17.9]    Age/Sex: 69 y.o. male    Network Utilization Review Department  ATTENTION: Please call with any questions or concerns to [phone] and carefully listen to the prompts so that you are directed to the right person. All voicemails are confidential.   For Discharge needs, contact Care Management DC Support Team at [phone] opt. 2  Send all requests for admission clinical reviews, approved or denied  determinations and any other requests to dedicated fax number below belonging to the campus where the patient is receiving treatment. List of dedicated fax numbers for the Facilities:  FACILITY NAME UR FAX NUMBER   ADMISSION DENIALS (Administrative/Medical Necessity) [phone]   DISCHARGE SUPPORT TEAM (NETWORK) [phone]   PARENT CHILD HEALTH (Maternity/NICU/Pediatrics) [phone]   [facility] [phone]   [facility] [phone]   [facility] [phone]   [facility] [phone]   [facility] [phone]   [facility] [phone]   [facility] [phone]   [facility] [phone]   [facility] [phone]   [facility] [phone]   [facility] [phone]   [facility] [phone]

## 2024-12-09 NOTE — ASSESSMENT & PLAN NOTE
Baseline creatinine 0.95 as of June 2024.  Recent hospital admission at [facility] for nephrotic syndrome.  Renal biopsy revealed minimal-change disease.  Currently on steroids/steroid taper.  During recent hospitalization in October required short-term dialysis with subsequent recovery after approximately 2 weeks of dialysis.  Creatinine now has stabilized around 2 and is 1.9 today.  He did have CT with contrast 12/07 to rule out PE and received some IV fluid for renal prep. No evidence of HARI.   Hold losartan temporarily. May resume at discharge.  Can continue home torsemide 60 mg daily.  Message sent to office to arrange follow up with Dr. Vazquez. He has virtual appointment later today with his nephrologist at [facility].

## 2024-12-09 NOTE — DISCHARGE SUMMARY
"Discharge Summary - Hospitalist   Name: Angel Escoto 69 y.o. male I MRN: [id number]  Unit/Bed#: -01 I Date of Admission: 12/7/2024   Date of Service: 12/9/2024 I Hospital Day: 2     Assessment & Plan  Acute pulmonary embolism (HCC)  Presented with pleuritic chest pain and shortness of breath  Admitted with acute pulmonary embolism  CT chest PE with contrast study showed-\"Acute segmental pulmonary embolus in the posterior basilar segment pulmonary artery of the right lower lobe. Overall clot burden is small.Measured RV/LV ratio is within normal limits at less than 0.9.Possible partially imaged deep venous thrombosis within the proximal left superficial femoral vein. Correlation with venous Dopplers of the lower extremities is advised.\"  Troponin and BNP are normal  Echocardiogram on 12/4 showed LVEF 55% with grade 1 diastolic dysfunction.   Patient had VQ scan on 12/3-showed low probability of PE. Diminished ventilation with preserved perfusion of the left lung base, findings could be related to developing airspace disease or mucous plugging.   Patient was taking loading dose of Eliquis 10 mg twice daily  Heparin gtt dc'd and transitioned to lovenox 1mg/kg bid  BL LE dopplers RIGHT LOWER LIMB:  There is evidence of acute non-occlusive thrombus within the popliteal vein and  paired posterior tibial veins  Oncology consulted and do not believe this is eliquis failure however given multiple reasons for hypercoagulable state and is best to transition to Lovenox 1 mg/kg twice daily    F/u with pulm outpt, will be discharged on lovenox 1mg/kg bid  Hypothyroid  On Synthroid  Mantle cell lymphoma (HCC)  Recent diagnosis. Following with [facility]. Scheduled to start Rituxan on 12/10.   Continue outpatient follow-up at [facility]  Acute deep vein thrombosis (DVT) of right lower extremity (HCC)  Patient diagnosed with DVT with recent admission from 12/3 to 12/5/24  Venous duplex on 12/3  Right lower limb Evaluation shows " acute non-occlusive thrombosis in the popliteal vein, posterior tibial veins, and peroneal veins.  Was discharged on Eliquis 10 mg twice daily for 7 days, will continue with 5 mg twice daily afterwards.     CKD (chronic kidney disease) stage 4, GFR 15-29 ml/min ([facility])  Lab Results   Component Value Date    EGFR 34 12/09/2024    EGFR 34 12/08/2024    EGFR 29 12/07/2024    CREATININE 1.92 (H) 12/09/2024    CREATININE 1.94 (H) 12/08/2024    CREATININE 2.21 (H) 12/07/2024   Biopsy proven minimal change disease developed about 6 weeks ago. Had gained about 40 lbs of water weight. Temporary dialysis catheter placed for 2 weeks, removed about 1 month ago.   On prednisone taper. Currently on 2.5 mg.   Follows with Dr. Kel Isaac at [facility] and recently saw Dr Vazquez with [facility]   Unclear baseline at this point.    Will request nephrology consult since patient got IV contrast  Continue torsemide  Resumed cozaar on discharge    F/u with nephro outpt  Anemia  Patient admitted with hemoglobin of 8.7  Discharged on 12/5 with hemoglobin of 8.6  Hemoglobin this morning is 7.7  Repeat H&H in evening  Denies hematuria, hemoptysis or known melena   Transfuse < 7  Patient's previous iron panel consistent with anemia of chronic disease.  Nephrotic syndrome    Acute kidney injury (HCC)    Other proteinuria    Episodic cluster headache       Medical Problems       Resolved Problems  Date Reviewed: 12/7/2024   None       Discharging Physician / Practitioner: Henna Rojas MD  PCP: Debra Crowe PA-C  Admission Date:   Admission Orders (From admission, onward)       Ordered        12/07/24 1342  INPATIENT ADMISSION  Once                          Discharge Date: 12/09/24    Consultations During Hospital Stay:  Pulmonology  Nephrology  Oncology  Case management      Procedures Performed:   CT pe study w abdomen pelvis w contrast   Final Result      Acute segmental pulmonary embolus in the posterior basilar segment pulmonary  artery of the right lower lobe. Overall clot burden is small.      Measured RV/LV ratio is within normal limits at less than 0.9.      Possible partially imaged deep venous thrombosis within the proximal left superficial femoral vein. Correlation with venous Dopplers of the lower extremities is advised.      Mildly prominent upper abdominal and retroperitoneal lymph nodes that are nonspecific and may be reactive. Follow-up CT is recommended in 3 months for this finding.      Relative dilation of the left ventricular cardiac chamber.      Small right pleural effusion with right basilar atelectasis.      Possible upper urinary tract infection involving the ureters without CT findings of pyelonephritis.      Minimal aneurysmal dilation of the aortic root measuring 4.1 cm at the sinuses of Valsalva.         I personally discussed this study with GAVIN NIXON via HIPAA compliant epic secure chat on 12/7/2024 1:30 PM.            Workstation performed: [id number]          VAS VENOUS DUPLEX - LOWER LIMB BILATERAL    (Results Pending)     Venous dopplers showing:  RIGHT LOWER LIMB:  There is evidence of acute non-occlusive thrombus within the popliteal vein and  paired posterior tibial veins    Significant Findings / Test Results:   See above    Incidental Findings:   none       Test Results Pending at Discharge (will require follow up):   none     Outpatient Tests Requested:  none    Complications:  none known at this time    Reason for Admission: Pulmonary embolism    Hospital Course:   Angel Escoto is a 69 y.o. male patient who originally presented to the hospital on 12/7/2024 due to pleuritic chest pain and shortness of breath.  In the ED imaging showing acute PE.  Nephrology was consulted due to history of CKD and pulmonology was consulted due to PE.  Patient was started on a heparin drip.  Patient recently admitted with acute DVT and was placed on Eliquis and loaded with Eliquis.  Due to multiple reasons for  hypercoagulable state oncology was consulted.  Discussed with radiology VQ scan cannot determine whether or not this was Eliquis failure.  Oncology does not believe that this is Eliquis failure however due to multiple hypercoagulable states such as mantle cell lymphoma and nephrotic syndrome also with a recent admission of acute DVT patient was transitioned from heparin drip to Lovenox.  Patient is otherwise remained hemodynamically stable afebrile in no acute respiratory distress.  Patient has been medically cleared for discharge.  Patient has to follow-up with his PCP nephrology pulmonology and neurology.    Hospital Course: No notes on file      Please see above list of diagnoses and related plan for additional information.     Condition at Discharge: stable    Discharge Day Visit / Exam:   Subjective:  Angel is seen and examined at bedside.  No acute events overnight.  Discussed plan of care.  All questions and concerns were answered and addressed.  States that he has headaches that occur daily at night resolved with Tylenol.  Denies any lightheadedness vertigo nausea vomiting imbalance.  Also denies any vision changes blurriness of vision nor flashes of light.  Discussed neurology referral if does not improve or worsens and not responsive to Tylenol  Vitals: Blood Pressure: 143/78 (12/09/24 0940)  Pulse: 72 (12/09/24 0940)  Temperature: 98.4 °F (36.9 °C) (12/08/24 1638)  Temp Source: Oral (12/07/24 1415)  Respirations: 18 (12/07/24 1415)  Height: 6' (182.9 cm) (12/07/24 1416)  Weight - Scale: 90.9 kg (200 lb 6.4 oz) (12/07/24 1416)  SpO2: 97 % (12/09/24 0940)  Physical Exam  Vitals and nursing note reviewed.   Constitutional:       General: He is not in acute distress.     Appearance: He is not ill-appearing.   HENT:      Head: Normocephalic and atraumatic.   Cardiovascular:      Rate and Rhythm: Normal rate and regular rhythm.      Pulses: Normal pulses.      Heart sounds: Normal heart sounds.   Pulmonary:       Effort: Pulmonary effort is normal.      Breath sounds: Normal breath sounds.   Abdominal:      General: Abdomen is flat. Bowel sounds are normal.      Palpations: Abdomen is soft.   Musculoskeletal:      Right lower leg: Edema present.      Left lower leg: Edema present.   Skin:     General: Skin is warm.   Neurological:      General: No focal deficit present.      Mental Status: He is alert and oriented to person, place, and time.          Discussion with Family: Updated  (wife) via phone.    Discharge instructions/Information to patient and family:   See after visit summary for information provided to patient and family.      Provisions for Follow-Up Care:  See after visit summary for information related to follow-up care and any pertinent home health orders.      Mobility at time of Discharge:   Basic Mobility Inpatient Raw Score: 22  JH-HLM Goal: 7: Walk 25 feet or more  JH-HLM Achieved: 8: Walk 250 feet ot more  HLM Goal achieved. Continue to encourage appropriate mobility.     Disposition:   Home    Planned Readmission: no    Discharge Medications:  See after visit summary for reconciled discharge medications provided to patient and/or family.      Administrative Statements   Discharge Statement:  I have spent a total time of 40 minutes in caring for this patient on the day of the visit/encounter. >30 minutes of time was spent on: Diagnostic results, Prognosis, Risks and benefits of tx options, Instructions for management, Patient and family education, Importance of tx compliance, Risk factor reductions, Impressions, Counseling / Coordination of care, Documenting in the medical record, Reviewing / ordering tests, medicine, procedures  , and Communicating with other healthcare professionals .    **Please Note: This note may have been constructed using a voice recognition system**

## 2024-12-09 NOTE — TELEPHONE ENCOUNTER
----- Message from RAY Wellington sent at 12/9/2024 11:39 AM EST -----  Please schedule a follow-up appointment with Dr. Vazquez in 2 to 4 weeks, no AP.  Will need BMP prior to the appointment.      Thank you

## 2024-12-09 NOTE — PROGRESS NOTES
Progress Note - Nephrology   Name: Angel Escoto 69 y.o. male I MRN: [id number]  Unit/Bed#: -01 I Date of Admission: 12/7/2024   Date of Service: 12/9/2024 I Hospital Day: 2     Assessment & Plan  Acute kidney injury (HCC)  Baseline creatinine 0.95 as of June 2024.  Recent hospital admission at [facility] for nephrotic syndrome.  Renal biopsy revealed minimal-change disease.  Currently on steroids/steroid taper.  During recent hospitalization in October required short-term dialysis with subsequent recovery after approximately 2 weeks of dialysis.  Creatinine now has stabilized around 2 and is 1.9 today.  He did have CT with contrast 12/07 to rule out PE and received some IV fluid for renal prep. No evidence of HARI.   Hold losartan temporarily. May resume at discharge.  Can continue home torsemide 60 mg daily.  Message sent to office to arrange follow up with Dr. Vazquez. He has virtual appointment later today with his nephrologist at [facility].  CKD (chronic kidney disease) stage 4, GFR 15-29 ml/min ([facility])    Other proteinuria    Nephrotic syndrome  Due to biopsy-proven minimal-change disease.   Initially on IV steroids for induction.  Discharged from [facility] on October 21 on prednisone 60 mg daily.  Now followed by Dr. Vazquez for management in addition to Dr. Kel Isaac at [facility].  On prednisone taper currently 2.5 mg daily which started on 12/3/2024.  Last UPC ratio 6.2 g.  May resume Losartan at discharge.  Likely starting Rituxan as an outpatient.  Acute pulmonary embolism (HCC)  Recently admitted earlier this week with acute DVT and discharged on Eliquis.  Now admitted with acute PE.  Currently on heparin drip.  Likely has hypercoagulable state due to nephrotic syndrome and malignancy.  Acute deep vein thrombosis (DVT) of right lower extremity (HCC)  Anticoagulation as above.  LE duplex pending.  Mantle cell lymphoma (HCC)  Will be starting outpatient rituximab.  Anemia  Recent iron studies  with ferritin 408, iron saturation 9%    Subjective     Patient is resting in bed.  He denies chest discomfort or shortness of breath.  He denies nausea, vomiting, diarrhea, or changes with his urination.  He reports decreased appetite as he has been less active.    Objective :  Temp:  [98.4 °F (36.9 °C)] 98.4 °F (36.9 °C)  HR:  [72-85] 72  BP: (133-143)/(71-78) 143/78  SpO2:  [95 %-97 %] 97 %  O2 Device: None (Room air)    Current Weight: Weight - Scale: 90.9 kg (200 lb 6.4 oz)  First Weight: Weight - Scale: 90.9 kg (200 lb 6.4 oz)  I/O         12/07 0701  12/08 0700 12/08 0701 12/09 0700 12/09 0701  12/10 0700    P.O. 702 1020     I.V. (mL/kg) 10 (0.1) 10 (0.1)     Total Intake(mL/kg) 712 (7.8) 1030 (11.3)     Urine (mL/kg/hr) 1425 400 (0.2)     Total Output 1425 400     Net -713 +630                  Physical Exam  Vitals reviewed.   Constitutional:       Appearance: Normal appearance.   HENT:      Head: Normocephalic.      Nose: Nose normal.      Mouth/Throat:      Mouth: Mucous membranes are moist.   Cardiovascular:      Heart sounds: Normal heart sounds.   Pulmonary:      Breath sounds: Normal breath sounds.   Abdominal:      Palpations: Abdomen is soft.   Musculoskeletal:      Right lower leg: Edema present.      Left lower leg: Edema present.   Skin:     General: Skin is warm and dry.   Neurological:      Mental Status: He is alert. Mental status is at baseline.   Psychiatric:         Mood and Affect: Mood normal.         Medications:    Current Facility-Administered Medications:     acetaminophen (TYLENOL) tablet 650 mg, 650 mg, Oral, Q6H PRN, Antonio Nguyen MD, 650 mg at 12/09/24 0504    albuterol inhalation solution 2.5 mg, 2.5 mg, Nebulization, Q4H PRN, Antonio Nguyen MD    docusate sodium (COLACE) capsule 100 mg, 100 mg, Oral, BID, Antonio Nguyen MD, 100 mg at 12/09/24 0942    DULoxetine (CYMBALTA) delayed release capsule 20 mg, 20 mg, Oral, Daily, Antonio Nguyen MD, 20 mg at 12/09/24  0937    famotidine (PEPCID) tablet 20 mg, 20 mg, Oral, Daily, Antonio Nguyen MD, 20 mg at 12/09/24 0937    heparin (porcine) 25,000 units in 0.45% NaCl 250 mL infusion (premix), 3-30 Units/kg/hr (Order-Specific), Intravenous, Titrated, Antonio Nguyen MD, Last Rate: 12.6 mL/hr at 12/09/24 0219, 14 Units/kg/hr at 12/09/24 0219    heparin (porcine) injection 3,600 Units, 3,600 Units, Intravenous, Q6H PRN, Antonio Nguyen MD    heparin (porcine) injection 7,200 Units, 7,200 Units, Intravenous, Q6H PRN, Antonio Nguyen MD    levothyroxine tablet 100 mcg, 100 mcg, Oral, Daily, Antonio Nguyen MD, 100 mcg at 12/09/24 0502    [Held by provider] losartan (COZAAR) tablet 25 mg, 25 mg, Oral, Daily, Antonio Nguyen MD    ondansetron (ZOFRAN) injection 4 mg, 4 mg, Intravenous, Q6H PRN, Antonio Nguyen MD    predniSONE tablet 2.5 mg, 2.5 mg, Oral, Daily, Antonio Nguyen MD, 2.5 mg at 12/09/24 0937    senna (SENOKOT) tablet 8.6 mg, 8.6 mg, Oral, HS, Antonio Nguyen MD, 8.6 mg at 12/07/24 2219    sulfamethoxazole-trimethoprim (BACTRIM) 400-80 mg per tablet 1 tablet, 1 tablet, Oral, Daily, Antonio Nguyen MD, 1 tablet at 12/09/24 0938    tadalafil (CIALIS) tablet 5 mg, 5 mg, Oral, Daily, Antonio Nguyen MD, 5 mg at 12/09/24 0938    torsemide (DEMADEX) tablet 60 mg, 60 mg, Oral, Daily, Devonte Vazquez, DO, 60 mg at 12/09/24 0937    traMADol (ULTRAM) tablet 50 mg, 50 mg, Oral, Q6H PRN, Antonio Nguyen MD      Lab Results: I have reviewed the following results:  Results from last 7 days   Lab Units 12/09/24  0511 12/08/24  1419 12/08/24  0431 12/07/24  1002 12/05/24  0425 12/04/24  1151 12/04/24  0424 12/03/24  1702   WBC Thousand/uL 7.24  --  7.77 8.02 7.83  --  8.15 7.65   HEMOGLOBIN g/dL 7.9* 9.0* 7.7* 8.7* 8.6*  --  8.4* 9.0*   HEMATOCRIT % 25.2* 28.6* 24.6* 27.9* 26.8*  --  26.4* 28.2*   PLATELETS Thousands/uL 259  --  237 244 263  --  199 228   POTASSIUM mmol/L 4.0  --  4.1 3.9 4.2  --  4.0 4.4   CHLORIDE  "mmol/L 109*  --  109* 106 108  --  108 104   CO2 mmol/L 27  --  28 30 27  --  28 30   BUN mg/dL 35*  --  36* 36* 36*  --  42* 44*   CREATININE mg/dL 1.92*  --  1.94* 2.21* 1.97*  --  2.19* 2.35*   CALCIUM mg/dL 7.4*  --  7.3* 8.2* 8.0*  --  8.1* 8.5   MAGNESIUM mg/dL  --   --  2.2  --   --   --  2.2  --    PHOSPHORUS mg/dL  --   --  3.4  --   --  4.3*  --   --    ALBUMIN g/dL  --   --  2.3* 2.6*  --   --   --  2.8*       Administrative Statements     Portions of the record may have been created with voice recognition software. Occasional wrong word or \"sound a like\" substitutions may have occurred due to the inherent limitations of voice recognition software. Read the chart carefully and recognize, using context, where substitutions have occurred.If you have any questions, please contact the dictating provider.  "

## 2024-12-09 NOTE — ASSESSMENT & PLAN NOTE
Recent diagnosis. Following with [facility]. Scheduled to start Rituxan on 12/10.   Continue outpatient follow-up at [facility]

## 2024-12-09 NOTE — ASSESSMENT & PLAN NOTE
Diagnosed DVT 12/3, CT angio RLL PE 12/7 while on abixaban  Started Apixaban 10mg BID 12/4 - denied missed doses, denied any chest pain with presentation  Very high risk for VTE given combined mantle cell lymphoma and nephrotic syndrome  Continue UFH gtt for now, follow up repeat LE US - if clot propagation and/or new DVT found would certainly consider this apixaban failure  Consider oncology consult for further systemic AC given active malignancy - to consider parental agent if renal function will allow

## 2024-12-09 NOTE — ASSESSMENT & PLAN NOTE
Recent diagnosis. Following with [facility]. Scheduled to start Rituxan on 12/10.   Continue outpatient follow-up at [facility]

## 2024-12-09 NOTE — DISCHARGE INSTR - AVS FIRST PAGE
You are to follow-up with your PCP in 1 to 2 weeks    You are to follow-up with nephrology  You are to follow-up with pulmonology    You will have a referral for neurology if headaches continue or worsen.    You will be discharged with Lovenox that you are to inject twice a day  Your Lasix has been decreased from 100 mg to be taken daily to 60 mg to be taken daily

## 2024-12-09 NOTE — PLAN OF CARE
Problem: PAIN - ADULT  Goal: Verbalizes/displays adequate comfort level or baseline comfort level  Description: Interventions:  - Encourage patient to monitor pain and request assistance  - Assess pain using appropriate pain scale  - Administer analgesics based on type and severity of pain and evaluate response  - Implement non-pharmacological measures as appropriate and evaluate response  - Consider cultural and social influences on pain and pain management  - Notify physician/advanced practitioner if interventions unsuccessful or patient reports new pain  Outcome: Progressing     Problem: INFECTION - ADULT  Goal: Absence or prevention of progression during hospitalization  Description: INTERVENTIONS:  - Assess and monitor for signs and symptoms of infection  - Monitor lab/diagnostic results  - Monitor all insertion sites, i.e. indwelling lines, tubes, and drains  - Monitor endotracheal if appropriate and nasal secretions for changes in amount and color  - [place] appropriate cooling/warming therapies per order  - Administer medications as ordered  - Instruct and encourage patient and family to use good hand hygiene technique  - Identify and instruct in appropriate isolation precautions for identified infection/condition  Outcome: Progressing  Goal: Absence of fever/infection during neutropenic period  Description: INTERVENTIONS:  - Monitor WBC    Outcome: Progressing

## 2024-12-09 NOTE — CASE MANAGEMENT
Case Management Assessment & Discharge Planning Note    Patient name Angel Escoto  Location /-01 MRN [id number]  : 1955 Date 2024       Current Admission Date: 2024  Current Admission Diagnosis:Acute kidney injury (HCC)   Patient Active Problem List    Diagnosis Date Noted Date Diagnosed    Episodic cluster headache 2024     Acute pulmonary embolism (HCC) 2024     Hyperphosphatemia 2024     Acute on chronic renal insufficiency 2024     Slow transit constipation 2024     Acute kidney injury (HCC) 2024     Other proteinuria 2024     Chronic kidney disease 2024     Acute deep vein thrombosis (DVT) of right lower extremity (HCC) 2024     Anemia 2024     CKD (chronic kidney disease) stage 4, GFR 15-29 ml/min (HCC) 2024     SOB (shortness of breath) 2024     Diabetes mellitus due to underlying condition with diabetic nephropathy, without long-term current use of insulin (HCC) 2024     Nephrotic syndrome 2024     Acute interstitial nephritis 2024     Minimal change disease 2024     Mantle cell lymphoma (HCC) 2024     Hypothyroid 2019     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       LOS (days): 2  Geometric Mean LOS (GMLOS) (days):   Days to GMLOS:     OBJECTIVE:    Risk of Unplanned Readmission Score: 24.81         Current admission status: Inpatient       Preferred Pharmacy:   [id number] [place] - [address]  [address] [place] [id number]  Phone: [phone] Fax: [phone]    Primary Care Provider: Debra Crowe PA-C    Primary Insurance: BLUE CROSS  Secondary Insurance:     ASSESSMENT:  Active Health Care Proxies       Cailin Escoto Health Care Agent - Spouse   Primary Phone: [phone] (Mobile)                 Advance Directives  Does patient have a Health Care POA?: Yes  Does patient have Advance Directives?: Yes  Advance Directives:  Living will, Power of  for health care  Primary Contact: Cailin Escoto, spouse.         Readmission Root Cause  30 Day Readmission: No    Patient Information  Admitted from:: Home  Mental Status: Alert  During Assessment patient was accompanied by: Not accompanied during assessment  Assessment information provided by:: Patient  Primary Caregiver: Self  Support Systems: Spouse/significant other, Self  County of Residence: [place]  What city do you live in?: [place]  Home entry access options. Select all that apply.: Stairs  Number of steps to enter home.: 2  Do the steps have railings?: Yes  Type of Current Residence: 2 story home  Upon entering residence, is there a bedroom on the main floor (no further steps)?: No  A bedroom is located on the following floor levels of residence (select all that apply):: 2nd Floor  Upon entering residence, is there a bathroom on the main floor (no further steps)?: Yes  Number of steps to 2nd floor from main floor: One Flight  Living Arrangements: Lives w/ Spouse/significant other  Is patient a ?: No    Activities of Daily Living Prior to Admission  Functional Status: Independent  Completes ADLs independently?: Yes  Ambulates independently?: Yes  Does patient use assisted devices?: No  Does patient currently own DME?: No  Does patient have a history of Outpatient Therapy (PT/OT)?: Yes  Does the patient have a history of Short-Term Rehab?: No  Does patient have a history of HHC?: No  Does patient currently have HHC?: No         Patient Information Continued  Income Source: Employed  Does patient have prescription coverage?: Yes  Does patient receive dialysis treatments?: No  Does patient have a history of substance abuse?: No  Does patient have a history of Mental Health Diagnosis?: No         Means of [facility]          DISCHARGE DETAILS:    Discharge planning discussed with:: Pt  Freedom of Choice: Yes     CM contacted  family/caregiver?: No- see comments (Pt is in contact with family.)  Were Treatment Team discharge recommendations reviewed with patient/caregiver?: Yes  Did patient/caregiver verbalize understanding of patient care needs?: Yes  Were patient/caregiver advised of the risks associated with not following Treatment Team discharge recommendations?: Yes         Requested Home Health Care         Is the patient interested in HHC at discharge?: No    DME Referral Provided  Referral made for DME?: No    Other Referral/Resources/Interventions Provided:  Interventions: None Indicated         Treatment Team Recommendation: Home  Discharge Destination Plan:: Home                                         Additional Comments: CM met with pt at bedside to discern discharge needs. Pt lives with spouse and 2 children in a 2sh, 2STE, bed upstairs. Pt reports being independent with ADLs and walking PTA. Does not use or own DME. Employed and drives. Hx of OPPT. No STR or HHC hx. No inpatient psych or D&A hx. Spouse is medical POA. Pt will drive self home from hospital. Car is in lot.

## 2024-12-10 NOTE — TELEPHONE ENCOUNTER
Called and spoke with javier and went over lab request and scheduled follow up with Dr. Vazquez in [place] 12/30 12 noon.

## 2024-12-10 NOTE — UTILIZATION REVIEW
NOTIFICATION OF ADMISSION DISCHARGE   This is a Notification of Discharge from [facility]. Please be advised that this patient has been discharge from our facility. Below you will find the admission and discharge date and time including the patient’s disposition.   UTILIZATION REVIEW CONTACT:  Virgen Rose  Utilization   Network Utilization Review Department  Phone: [phone] x carefully listen to the prompts. All voicemails are confidential.  Email: [email or web address]     ADMISSION INFORMATION  PRESENTATION DATE: 12/7/2024  9:43 AM  OBERVATION ADMISSION DATE: N/A  INPATIENT ADMISSION DATE: 12/7/24  1:42 PM   DISCHARGE DATE: 12/9/2024  2:33 PM   DISPOSITION:Home/Self Care    Network Utilization Review Department  ATTENTION: Please call with any questions or concerns to [phone] and carefully listen to the prompts so that you are directed to the right person. All voicemails are confidential.   For Discharge needs, contact Care Management DC Support Team at [phone] opt. 2  Send all requests for admission clinical reviews, approved or denied determinations and any other requests to dedicated fax number below belonging to the campus where the patient is receiving treatment. List of dedicated fax numbers for the Facilities:  FACILITY NAME UR FAX NUMBER   ADMISSION DENIALS (Administrative/Medical Necessity) [phone]   DISCHARGE SUPPORT TEAM ([facility]) [phone]   PARENT CHILD HEALTH (Maternity/NICU/Pediatrics) [phone]   [facility] [phone]   [facility] [phone]   [facility] [phone]   [facility] [phone]   [facility] [phone]   [facility] [phone]   General acute hospital [phone]   [facility] [phone]    [facility] [phone]   Critical access hospital [phone]   [facility] [phone]   [facility] ([place], [place]) [phone]

## 2024-12-30 ENCOUNTER — OFFICE VISIT (OUTPATIENT)
Dept: NEPHROLOGY | Facility: CLINIC | Age: 69
End: 2024-12-30
Payer: COMMERCIAL

## 2024-12-30 VITALS
BODY MASS INDEX: 28.44 KG/M2 | HEIGHT: 72 IN | DIASTOLIC BLOOD PRESSURE: 78 MMHG | SYSTOLIC BLOOD PRESSURE: 132 MMHG | WEIGHT: 210 LBS

## 2024-12-30 DIAGNOSIS — N18.4 CKD (CHRONIC KIDNEY DISEASE) STAGE 4, GFR 15-29 ML/MIN (HCC): ICD-10-CM

## 2024-12-30 DIAGNOSIS — N04.9 NEPHROTIC SYNDROME: ICD-10-CM

## 2024-12-30 DIAGNOSIS — N05.0 MINIMAL CHANGE DISEASE: Primary | ICD-10-CM

## 2024-12-30 DIAGNOSIS — C83.10 MANTLE CELL LYMPHOMA, UNSPECIFIED BODY REGION (HCC): ICD-10-CM

## 2024-12-30 DIAGNOSIS — E83.39 HYPERPHOSPHATEMIA: ICD-10-CM

## 2024-12-30 PROCEDURE — 99214 OFFICE O/P EST MOD 30 MIN: CPT | Performed by: INTERNAL MEDICINE

## 2024-12-30 RX ORDER — PREDNISONE 1 MG/1
1 TABLET ORAL DAILY
COMMUNITY
Start: 2024-12-09

## 2024-12-30 NOTE — ASSESSMENT & PLAN NOTE
Lab Results   Component Value Date    EGFR 34 (L) 12/23/2024    EGFR 25 (L) 12/17/2024    EGFR 34 12/09/2024    CREATININE 2.08 (H) 12/23/2024    CREATININE 2.73 (H) 12/17/2024    CREATININE 1.92 (H) 12/09/2024   Now over 3 months where his creatinine has been elevated, may have stage IIIb to stage IV chronic kidney disease, his GFR on December 17 was 25, on December 23 was 35

## 2024-12-30 NOTE — PROGRESS NOTES
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  He is seeing Dr. Kel Isaac as a glomerular specialist  We will follow every 6 months for general nephrology and local nephrology needs  On anticoagulation with Lovenox in the setting of malignancy and potential DOAC failure.  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 Kel Isaac at the [facility]   I did speak with Dr. Isaac on the phone he has been collaborating with the patient's oncologist.  This instance appears to be a minimal-change disease that has been exacerbated by his mantle cell lymphoma which is prompting them to start treatment.  Was started on Rituxan  For continuity of care patient should continue to follow with Dr. Isaac glomerular disease needs and we will continue local nephrology care every 6 months  Continue losartan 25 mg daily potentially uptitrate to 50 mg daily if his systolic  blood pressure goes up to 140  Significant edema in the lower extremities as well as periorbital edema.  Will increase his torsemide to 40 mg twice daily for 3 days to see if this improves.  His creatinine did improve when holding torsemide down to 2 from 2.8    Hyperphosphatemia  Continue to monitor continue low phosphorus diet       CKD (chronic kidney disease) stage 4, GFR 15-29 ml/min ([facility])  Lab Results   Component Value Date    EGFR 34 (L) 12/23/2024    EGFR 25 (L) 12/17/2024    EGFR 34 12/09/2024    CREATININE 2.08 (H) 12/23/2024    CREATININE 2.73 (H) 12/17/2024    CREATININE 1.92 (H) 12/09/2024   Now over 3 months where his creatinine has been elevated, may have stage IIIb to stage IV chronic kidney disease, his GFR on December 17 was 25, on December 23 was 35       Mantle cell lymphoma, unspecified body region ([facility])  Continues to follow-up with his oncologist at [facility]       His blood work is to be ordered by [facility] he is going weekly at this time  Will follow-up semiannually for now    History of Present Illness     Angel Escoto is a 69 y.o. male who presents for follow-up.  He has a history of mantle cell lymphoma that has remained indolent for 7 years, he presented after a recent trip to [place] with nephrotic syndrome and acute kidney injury he had a renal biopsy that showed minimal-change disease with acute interstitial nephritis he was started on steroids and dialysis he did have renal recovery he is currently off prednisone and on rituximab.  He is currently tolerating, still having some lower extremity edema, urinating about 2 L a day, blood pressures are currently stable.  There is no acute chest pain or shortness of breath fevers or chills nausea vomiting    History obtained from: patient    Review of Systems   Constitutional:  Negative for chills and fever.   HENT:  Negative for ear pain and sore throat.    Eyes:  Negative for pain and visual disturbance.   Respiratory:  Negative for cough and  shortness of breath.    Cardiovascular:  Negative for chest pain and palpitations.   Gastrointestinal:  Negative for abdominal pain and vomiting.   Genitourinary:  Negative for dysuria and hematuria.   Musculoskeletal:  Negative for arthralgias and back pain.   Skin:  Negative for color change and rash.   Neurological:  Negative for seizures and syncope.   All other systems reviewed and are negative.    Past Medical History   Past Medical History:   Diagnosis Date    Acute nephrotic syndrome     Disease of thyroid gland     DVT (deep venous thrombosis) (HCC)     Mantle cell lymphoma (HCC)      Past Surgical History:   Procedure Laterality Date    ABLATION OF DYSRHYTHMIC FOCUS      IR TUNNELED DIALYSIS CATHETER REMOVAL  11/13/2024     History reviewed. No pertinent family history.   reports that he has never smoked. He has never used smokeless tobacco. He reports current alcohol use. He reports that he does not use drugs.  Current Outpatient Medications on File Prior to Visit   Medication Sig Dispense Refill    acetaminophen (TYLENOL) 325 mg tablet every 6 (six) hours as needed      calcium carbonate (TUMS) 500 mg chewable tablet Chew 1 tablet daily      cholecalciferol (VITAMIN D3) 1,000 units tablet Take 2 tablets (2,000 Units total) by mouth daily 180 tablet 3    docusate sodium (COLACE) 100 mg capsule Take 1 capsule (100 mg total) by mouth 2 (two) times a day 60 capsule 0    DULoxetine (CYMBALTA) 20 mg capsule Take 20 mg by mouth daily      enoxaparin (LOVENOX) 100 mg/mL Inject 0.9 mL (90 mg total) under the skin every 12 (twelve) hours 60 mL 0    famotidine (PEPCID) 20 mg tablet Take 1 tablet (20 mg total) by mouth in the morning 90 tablet 3    levothyroxine 100 mcg tablet TAKE 1 TABLET BY MOUTH DAILY      losartan (COZAAR) 25 mg tablet Take 1 tablet (25 mg total) by mouth daily 30 tablet 11    predniSONE 1 mg tablet Take 1 tablet by mouth in the morning      tadalafil (CIALIS) 5 MG tablet Take 5 mg by mouth in  the morning      torsemide (DEMADEX) 20 mg tablet Take 3 tablets (60 mg total) by mouth daily (Patient taking differently: Take 40 mg by mouth daily) 90 tablet 0    traMADol (ULTRAM) 50 mg tablet Take 1 tablet (50 mg total) by mouth every 6 (six) hours as needed for moderate pain 10 tablet 0    MAGNESIUM PO Take 1 tablet by mouth daily (Patient not taking: Reported on 12/30/2024)      senna (SENOKOT) 8.6 mg Take 8.6 mg by mouth as needed (Patient not taking: Reported on 12/30/2024)      sulfamethoxazole-trimethoprim (BACTRIM) 400-80 mg per tablet Take 1 tablet by mouth daily (Patient not taking: Reported on 12/30/2024)       No current facility-administered medications on file prior to visit.     Allergies   Allergen Reactions    Hydromorphone Hallucinations, Other (See Comments), Delirium, Confusion and Visual Disturbance     hallucinosis    Patient saw things that were not there    Other reaction(s): Delirium, Hallucinations, Mental Status Changes, Other (see comments), Visual Disturbance   hallucinosis   hallucinosis   hallucinosis   hallucinosis   Patient saw things that were not there    hallucinosis   hallucinosis   Patient saw things that were not there    hallucinosis   hallucinosis   hallucinosis   hallucinosis   Patient saw things that were not there    hallucinosis   hallucinosis   hallucinosis   hallucinosis   hallucinosis   hallucinosis   Patient saw things that were not there    hallucinosis   hallucinosis   Patient saw things that were not there    hallucinosis    Medical Tape Rash     Itches    Skin irritate with EKG leads          Medical History Reviewed by provider this encounter:     .  Past Medical History   Past Medical History:   Diagnosis Date    Acute nephrotic syndrome     Disease of thyroid gland     DVT (deep venous thrombosis) (HCC)     Mantle cell lymphoma (HCC)      Past Surgical History:   Procedure Laterality Date    ABLATION OF DYSRHYTHMIC FOCUS      IR TUNNELED DIALYSIS CATHETER  REMOVAL  11/13/2024     History reviewed. No pertinent family history.   reports that he has never smoked. He has never used smokeless tobacco. He reports current alcohol use. He reports that he does not use drugs.  Current Outpatient Medications on File Prior to Visit   Medication Sig Dispense Refill    acetaminophen (TYLENOL) 325 mg tablet every 6 (six) hours as needed      calcium carbonate (TUMS) 500 mg chewable tablet Chew 1 tablet daily      cholecalciferol (VITAMIN D3) 1,000 units tablet Take 2 tablets (2,000 Units total) by mouth daily 180 tablet 3    docusate sodium (COLACE) 100 mg capsule Take 1 capsule (100 mg total) by mouth 2 (two) times a day 60 capsule 0    DULoxetine (CYMBALTA) 20 mg capsule Take 20 mg by mouth daily      enoxaparin (LOVENOX) 100 mg/mL Inject 0.9 mL (90 mg total) under the skin every 12 (twelve) hours 60 mL 0    famotidine (PEPCID) 20 mg tablet Take 1 tablet (20 mg total) by mouth in the morning 90 tablet 3    levothyroxine 100 mcg tablet TAKE 1 TABLET BY MOUTH DAILY      losartan (COZAAR) 25 mg tablet Take 1 tablet (25 mg total) by mouth daily 30 tablet 11    predniSONE 1 mg tablet Take 1 tablet by mouth in the morning      tadalafil (CIALIS) 5 MG tablet Take 5 mg by mouth in the morning      torsemide (DEMADEX) 20 mg tablet Take 3 tablets (60 mg total) by mouth daily (Patient taking differently: Take 40 mg by mouth daily) 90 tablet 0    traMADol (ULTRAM) 50 mg tablet Take 1 tablet (50 mg total) by mouth every 6 (six) hours as needed for moderate pain 10 tablet 0    MAGNESIUM PO Take 1 tablet by mouth daily (Patient not taking: Reported on 12/30/2024)      senna (SENOKOT) 8.6 mg Take 8.6 mg by mouth as needed (Patient not taking: Reported on 12/30/2024)      sulfamethoxazole-trimethoprim (BACTRIM) 400-80 mg per tablet Take 1 tablet by mouth daily (Patient not taking: Reported on 12/30/2024)       No current facility-administered medications on file prior to visit.     Allergies    Allergen Reactions    Hydromorphone Hallucinations, Other (See Comments), Delirium, Confusion and Visual Disturbance     hallucinosis    Patient saw things that were not there    Other reaction(s): Delirium, Hallucinations, Mental Status Changes, Other (see comments), Visual Disturbance   hallucinosis   hallucinosis   hallucinosis   hallucinosis   Patient saw things that were not there    hallucinosis   hallucinosis   Patient saw things that were not there    hallucinosis   hallucinosis   hallucinosis   hallucinosis   Patient saw things that were not there    hallucinosis   hallucinosis   hallucinosis   hallucinosis   hallucinosis   hallucinosis   Patient saw things that were not there    hallucinosis   hallucinosis   Patient saw things that were not there    hallucinosis    Medical Tape Rash     Itches    Skin irritate with EKG leads      Current Outpatient Medications on File Prior to Visit   Medication Sig Dispense Refill    acetaminophen (TYLENOL) 325 mg tablet every 6 (six) hours as needed      calcium carbonate (TUMS) 500 mg chewable tablet Chew 1 tablet daily      cholecalciferol (VITAMIN D3) 1,000 units tablet Take 2 tablets (2,000 Units total) by mouth daily 180 tablet 3    docusate sodium (COLACE) 100 mg capsule Take 1 capsule (100 mg total) by mouth 2 (two) times a day 60 capsule 0    DULoxetine (CYMBALTA) 20 mg capsule Take 20 mg by mouth daily      enoxaparin (LOVENOX) 100 mg/mL Inject 0.9 mL (90 mg total) under the skin every 12 (twelve) hours 60 mL 0    famotidine (PEPCID) 20 mg tablet Take 1 tablet (20 mg total) by mouth in the morning 90 tablet 3    levothyroxine 100 mcg tablet TAKE 1 TABLET BY MOUTH DAILY      losartan (COZAAR) 25 mg tablet Take 1 tablet (25 mg total) by mouth daily 30 tablet 11    predniSONE 1 mg tablet Take 1 tablet by mouth in the morning      tadalafil (CIALIS) 5 MG tablet Take 5 mg by mouth in the morning      torsemide (DEMADEX) 20 mg tablet Take 3 tablets (60 mg total) by  mouth daily (Patient taking differently: Take 40 mg by mouth daily) 90 tablet 0    traMADol (ULTRAM) 50 mg tablet Take 1 tablet (50 mg total) by mouth every 6 (six) hours as needed for moderate pain 10 tablet 0    MAGNESIUM PO Take 1 tablet by mouth daily (Patient not taking: Reported on 12/30/2024)      senna (SENOKOT) 8.6 mg Take 8.6 mg by mouth as needed (Patient not taking: Reported on 12/30/2024)      sulfamethoxazole-trimethoprim (BACTRIM) 400-80 mg per tablet Take 1 tablet by mouth daily (Patient not taking: Reported on 12/30/2024)       No current facility-administered medications on file prior to visit.      Social History     Tobacco Use    Smoking status: Never    Smokeless tobacco: Never   Vaping Use    Vaping status: Never Used   Substance and Sexual Activity    Alcohol use: Yes     Comment: rare    Drug use: Never    Sexual activity: Not on file        Objective   /78 (BP Location: Left arm, Patient Position: Sitting, Cuff Size: Large)   Ht 6' (1.829 m)   Wt 95.3 kg (210 lb)   BMI 28.48 kg/m²      Physical Exam  Vitals and nursing note reviewed.   Constitutional:       General: He is not in acute distress.     Appearance: He is well-developed.   HENT:      Head: Normocephalic and atraumatic.      Nose: Nose normal.   Eyes:      Conjunctiva/sclera: Conjunctivae normal.      Comments: Positive for periorbital edema   Cardiovascular:      Rate and Rhythm: Normal rate and regular rhythm.      Heart sounds: Normal heart sounds. No murmur heard.  Pulmonary:      Effort: Pulmonary effort is normal. No respiratory distress.      Breath sounds: Normal breath sounds.   Abdominal:      General: Bowel sounds are normal.      Palpations: Abdomen is soft.      Tenderness: There is no abdominal tenderness.   Musculoskeletal:         General: No swelling. Normal range of motion.      Cervical back: Normal range of motion and neck supple.      Right lower leg: Edema present.      Left lower leg: Edema present.    Skin:     General: Skin is warm and dry.      Capillary Refill: Capillary refill takes less than 2 seconds.   Neurological:      Mental Status: He is alert and oriented to person, place, and time.   Psychiatric:         Mood and Affect: Mood normal.         Behavior: Behavior normal.         Thought Content: Thought content normal.         Judgment: Judgment normal.

## 2024-12-30 NOTE — ASSESSMENT & PLAN NOTE
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 Kel Isaac at the [facility]   I did speak with Dr. Isaac on the phone he has been collaborating with the patient's oncologist.  This instance appears to be a minimal-change disease that has been exacerbated by his mantle cell lymphoma which is prompting them to start treatment.  Was started on Rituxan  For continuity of care patient should continue to follow with Dr. Isaac glomerular disease needs and we will continue local nephrology care every 6 months  Continue losartan 25 mg daily potentially uptitrate to 50 mg daily if his systolic blood pressure goes up to 140  Significant edema in the lower extremities as well as periorbital edema.  Will increase his torsemide to 40 mg twice daily for 3 days to see if this improves.  His creatinine did improve when holding torsemide down to 2 from 2.8

## 2024-12-30 NOTE — ASSESSMENT & PLAN NOTE
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  He is seeing Dr. Kel Isaac as a glomerular specialist  We will follow every 6 months for general nephrology and local nephrology needs  On anticoagulation with Lovenox in the setting of malignancy and potential DOAC failure.  Is seeing pulmonary to evaluate if he can go back on Eliquis

## 2025-01-03 ENCOUNTER — TELEPHONE (OUTPATIENT)
Dept: NEPHROLOGY | Facility: CLINIC | Age: 70
End: 2025-01-03

## 2025-01-03 ENCOUNTER — APPOINTMENT (OUTPATIENT)
Dept: LAB | Facility: HOSPITAL | Age: 70
End: 2025-01-03
Payer: COMMERCIAL

## 2025-01-03 DIAGNOSIS — N18.9 ANEMIA DUE TO CHRONIC KIDNEY DISEASE, UNSPECIFIED CKD STAGE: Primary | ICD-10-CM

## 2025-01-03 DIAGNOSIS — N05.0 MINIMAL CHANGE DISEASE: ICD-10-CM

## 2025-01-03 DIAGNOSIS — E08.21 DIABETES MELLITUS DUE TO UNDERLYING CONDITION WITH DIABETIC NEPHROPATHY, WITHOUT LONG-TERM CURRENT USE OF INSULIN (HCC): ICD-10-CM

## 2025-01-03 DIAGNOSIS — D64.9 ANEMIA, UNSPECIFIED TYPE: Primary | ICD-10-CM

## 2025-01-03 DIAGNOSIS — D63.1 ANEMIA DUE TO CHRONIC KIDNEY DISEASE, UNSPECIFIED CKD STAGE: ICD-10-CM

## 2025-01-03 DIAGNOSIS — C83.10 MANTLE CELL LYMPHOMA (HCC): ICD-10-CM

## 2025-01-03 DIAGNOSIS — R11.2 NAUSEA AND VOMITING, UNSPECIFIED VOMITING TYPE: ICD-10-CM

## 2025-01-03 DIAGNOSIS — D64.9 ANEMIA, UNSPECIFIED TYPE: ICD-10-CM

## 2025-01-03 DIAGNOSIS — N18.9 ANEMIA DUE TO CHRONIC KIDNEY DISEASE, UNSPECIFIED CKD STAGE: ICD-10-CM

## 2025-01-03 DIAGNOSIS — N04.9 NEPHROTIC SYNDROME: ICD-10-CM

## 2025-01-03 DIAGNOSIS — D63.1 ANEMIA DUE TO CHRONIC KIDNEY DISEASE, UNSPECIFIED CKD STAGE: Primary | ICD-10-CM

## 2025-01-03 DIAGNOSIS — K64.9 HEMORRHOIDS, UNSPECIFIED HEMORRHOID TYPE: ICD-10-CM

## 2025-01-03 DIAGNOSIS — N10 ACUTE INTERSTITIAL NEPHRITIS: ICD-10-CM

## 2025-01-03 LAB
ABO GROUP BLD: NORMAL
ABO GROUP BLD: NORMAL
ANION GAP SERPL CALCULATED.3IONS-SCNC: 2 MMOL/L (ref 4–13)
BLD GP AB SCN SERPL QL: NEGATIVE
BUN SERPL-MCNC: 31 MG/DL (ref 5–25)
CALCIUM SERPL-MCNC: 7.9 MG/DL (ref 8.4–10.2)
CHLORIDE SERPL-SCNC: 110 MMOL/L (ref 96–108)
CO2 SERPL-SCNC: 28 MMOL/L (ref 21–32)
CREAT SERPL-MCNC: 1.5 MG/DL (ref 0.6–1.3)
ERYTHROCYTE [DISTWIDTH] IN BLOOD BY AUTOMATED COUNT: 15 % (ref 11.6–15.1)
GFR SERPL CREATININE-BSD FRML MDRD: 47 ML/MIN/1.73SQ M
GLUCOSE SERPL-MCNC: 90 MG/DL (ref 65–140)
HCT VFR BLD AUTO: 26.2 % (ref 36.5–49.3)
HGB BLD-MCNC: 8.5 G/DL (ref 12–17)
MCH RBC QN AUTO: 33.6 PG (ref 26.8–34.3)
MCHC RBC AUTO-ENTMCNC: 32.4 G/DL (ref 31.4–37.4)
MCV RBC AUTO: 104 FL (ref 82–98)
PLATELET # BLD AUTO: 331 THOUSANDS/UL (ref 149–390)
PMV BLD AUTO: 8.9 FL (ref 8.9–12.7)
POTASSIUM SERPL-SCNC: 4.1 MMOL/L (ref 3.5–5.3)
RBC # BLD AUTO: 2.53 MILLION/UL (ref 3.88–5.62)
RH BLD: POSITIVE
RH BLD: POSITIVE
SODIUM SERPL-SCNC: 140 MMOL/L (ref 135–147)
SPECIMEN EXPIRATION DATE: NORMAL
WBC # BLD AUTO: 6.27 THOUSAND/UL (ref 4.31–10.16)

## 2025-01-03 PROCEDURE — 86850 RBC ANTIBODY SCREEN: CPT

## 2025-01-03 PROCEDURE — 80048 BASIC METABOLIC PNL TOTAL CA: CPT

## 2025-01-03 PROCEDURE — 86920 COMPATIBILITY TEST SPIN: CPT

## 2025-01-03 PROCEDURE — 86900 BLOOD TYPING SEROLOGIC ABO: CPT

## 2025-01-03 PROCEDURE — 86901 BLOOD TYPING SEROLOGIC RH(D): CPT

## 2025-01-03 PROCEDURE — 85027 COMPLETE CBC AUTOMATED: CPT

## 2025-01-03 PROCEDURE — 36415 COLL VENOUS BLD VENIPUNCTURE: CPT

## 2025-01-03 RX ORDER — SODIUM CHLORIDE 9 MG/ML
20 INJECTION, SOLUTION INTRAVENOUS ONCE
Status: CANCELLED | OUTPATIENT
Start: 2025-01-03

## 2025-01-03 RX ORDER — SODIUM CHLORIDE 9 MG/ML
20 INJECTION, SOLUTION INTRAVENOUS ONCE
Status: CANCELLED | OUTPATIENT
Start: 2025-01-06

## 2025-01-03 RX ORDER — SODIUM CHLORIDE 9 MG/ML
20 INJECTION, SOLUTION INTRAVENOUS ONCE
OUTPATIENT
Start: 2025-01-03

## 2025-01-03 NOTE — TELEPHONE ENCOUNTER
Patient had recent hgb at [facility] down to 7.5 on 12/31/24  and team at [facility] requested transfusion. Order placed in [place] to transfuse 1unit PRBC.

## 2025-01-04 LAB
ABO GROUP BLD BPU: NORMAL
BPU ID: NORMAL
CROSSMATCH: NORMAL
UNIT DISPENSE STATUS: NORMAL
UNIT PRODUCT CODE: NORMAL
UNIT PRODUCT VOLUME: 350 ML
UNIT RH: NORMAL

## 2025-01-06 ENCOUNTER — HOSPITAL ENCOUNTER (OUTPATIENT)
Dept: INFUSION CENTER | Facility: HOSPITAL | Age: 70
Discharge: HOME | End: 2025-01-06
Payer: COMMERCIAL

## 2025-01-06 ENCOUNTER — TELEPHONE (OUTPATIENT)
Age: 70
End: 2025-01-06

## 2025-01-06 ENCOUNTER — APPOINTMENT (OUTPATIENT)
Dept: LAB | Facility: HOSPITAL | Age: 70
End: 2025-01-06
Payer: COMMERCIAL

## 2025-01-06 DIAGNOSIS — D64.9 ANEMIA, UNSPECIFIED TYPE: Primary | ICD-10-CM

## 2025-01-06 DIAGNOSIS — D64.9 ANEMIA, UNSPECIFIED TYPE: ICD-10-CM

## 2025-01-06 LAB
ERYTHROCYTE [DISTWIDTH] IN BLOOD BY AUTOMATED COUNT: 14.5 % (ref 11.6–15.1)
HCT VFR BLD AUTO: 26.4 % (ref 36.5–49.3)
HGB BLD-MCNC: 8.4 G/DL (ref 12–17)
MCH RBC QN AUTO: 32.7 PG (ref 26.8–34.3)
MCHC RBC AUTO-ENTMCNC: 31.8 G/DL (ref 31.4–37.4)
MCV RBC AUTO: 103 FL (ref 82–98)
PLATELET # BLD AUTO: 276 THOUSANDS/UL (ref 149–390)
PMV BLD AUTO: 9.1 FL (ref 8.9–12.7)
RBC # BLD AUTO: 2.57 MILLION/UL (ref 3.88–5.62)
WBC # BLD AUTO: 6.36 THOUSAND/UL (ref 4.31–10.16)

## 2025-01-06 PROCEDURE — 85027 COMPLETE CBC AUTOMATED: CPT

## 2025-01-06 PROCEDURE — 36415 COLL VENOUS BLD VENIPUNCTURE: CPT

## 2025-01-06 NOTE — PROGRESS NOTES
Notified Dr. Vazquez of Hgb 8.5 from 1/3.  Per Dr. Vazquez HOLD transfusion today.  Patient to have repeat labs drawn tomorrow.  Patient notified and stated he had labs drawn at [facility] today.  Dr. Vazquez notified and will follow up with patient re: need for future blood transfusion.

## 2025-01-06 NOTE — TELEPHONE ENCOUNTER
Nurse from infusion center states a blood transfusion was ordered for patient and recommended by Yoandy based on Hgb 7.5 but  most current Hgb is 8.5. Patient will be at infusion center in 30 minutes. Please advise, thank you.

## 2025-01-13 ENCOUNTER — APPOINTMENT (OUTPATIENT)
Dept: LAB | Facility: HOSPITAL | Age: 70
End: 2025-01-13
Payer: COMMERCIAL

## 2025-01-13 DIAGNOSIS — D64.9 ANEMIA, UNSPECIFIED TYPE: ICD-10-CM

## 2025-01-13 DIAGNOSIS — E87.70 HYPERVOLEMIA: ICD-10-CM

## 2025-01-13 LAB
ERYTHROCYTE [DISTWIDTH] IN BLOOD BY AUTOMATED COUNT: 13.8 % (ref 11.6–15.1)
HCT VFR BLD AUTO: 25.3 % (ref 36.5–49.3)
HGB BLD-MCNC: 8.1 G/DL (ref 12–17)
MCH RBC QN AUTO: 33.5 PG (ref 26.8–34.3)
MCHC RBC AUTO-ENTMCNC: 32 G/DL (ref 31.4–37.4)
MCV RBC AUTO: 105 FL (ref 82–98)
PLATELET # BLD AUTO: 313 THOUSANDS/UL (ref 149–390)
PMV BLD AUTO: 8.8 FL (ref 8.9–12.7)
RBC # BLD AUTO: 2.42 MILLION/UL (ref 3.88–5.62)
WBC # BLD AUTO: 6.16 THOUSAND/UL (ref 4.31–10.16)

## 2025-01-13 PROCEDURE — 36415 COLL VENOUS BLD VENIPUNCTURE: CPT

## 2025-01-13 PROCEDURE — 85027 COMPLETE CBC AUTOMATED: CPT

## 2025-01-14 RX ORDER — TORSEMIDE 20 MG/1
60 TABLET ORAL DAILY
Qty: 270 TABLET | Refills: 1 | OUTPATIENT
Start: 2025-01-14

## 2025-01-20 ENCOUNTER — APPOINTMENT (OUTPATIENT)
Dept: LAB | Facility: HOSPITAL | Age: 70
End: 2025-01-20
Payer: COMMERCIAL

## 2025-01-20 DIAGNOSIS — D64.9 ANEMIA, UNSPECIFIED TYPE: ICD-10-CM

## 2025-01-20 LAB
ERYTHROCYTE [DISTWIDTH] IN BLOOD BY AUTOMATED COUNT: 13.1 % (ref 11.6–15.1)
HCT VFR BLD AUTO: 27.4 % (ref 36.5–49.3)
HGB BLD-MCNC: 8.9 G/DL (ref 12–17)
MCH RBC QN AUTO: 33.6 PG (ref 26.8–34.3)
MCHC RBC AUTO-ENTMCNC: 32.5 G/DL (ref 31.4–37.4)
MCV RBC AUTO: 103 FL (ref 82–98)
PLATELET # BLD AUTO: 291 THOUSANDS/UL (ref 149–390)
PMV BLD AUTO: 8.5 FL (ref 8.9–12.7)
RBC # BLD AUTO: 2.65 MILLION/UL (ref 3.88–5.62)
WBC # BLD AUTO: 6.54 THOUSAND/UL (ref 4.31–10.16)

## 2025-01-20 PROCEDURE — 36415 COLL VENOUS BLD VENIPUNCTURE: CPT

## 2025-01-20 PROCEDURE — 85027 COMPLETE CBC AUTOMATED: CPT

## 2025-02-10 NOTE — PROGRESS NOTES
Name: Angel Escoto      : 1955      MRN: [id number]  Encounter Provider: Rufino Cabrera MD  Encounter Date: 2025   Encounter [facility]  :  Assessment & Plan  Rectal bleeding  Angel is a 69-year-old male, he is in ongoing therapy at [facility] for mantle cell lymphoma, recent PET/CT with continued adenopathy, he is likely being enrolled in [place] trial for this.    In  was having rectal bleeding, question of internal hemorrhoids at outside hospital for possibility of hemorrhoidectomy.  The majority of the symptoms have resolved however he has hemoglobin of 9.8 on last check.    He is requesting colonoscopy at this time which I have discussed with him I would recommend given the last was 2018, normal.    We will endeavor to schedule colonoscopy prior to his start of trial as he is currently between chemo/immunotherapies and this is a reasonable window, will perform anorectal exam at that time as well.         Hemorrhoids, unspecified hemorrhoid type    Orders:  •  Ambulatory Referral to Colorectal Surgery    Nephrotic syndrome    Orders:  •  Ambulatory Referral to Colorectal Surgery         History of Present Illness   HPI    Angel Escoto is a 69 y.o. male with a history of mantle cell lymphoma who is referred by Dr. Yue Vazquez for hemorrhoids.     CT pulmonary angiogram and CT chest abdomen pelvis with contrast 24.  IMPRESSION:  Acute segmental pulmonary embolus in the posterior basilar segment pulmonary artery of the right lower lobe. Overall clot burden is small.  Measured RV/LV ratio is within normal limits at less than 0.9.  Possible partially imaged deep venous thrombosis within the proximal left superficial femoral vein. Correlation with venous Dopplers of the lower extremities is advised.  Mildly prominent upper abdominal and retroperitoneal lymph nodes that are nonspecific and may be reactive. Follow-up CT is  recommended in 3 months for this finding.  Relative dilation of the left ventricular cardiac chamber.  Small right pleural effusion with right basilar atelectasis.  Possible upper urinary tract infection involving the ureters without CT findings of pyelonephritis.  Minimal aneurysmal dilation of the aortic root measuring 4.1 cm at the sinuses of Valsalva.    His last colonoscopy was on 9/18/18 with Dr. Abdoul Uribe. This procedure was unremarkable. There were no biopsies taken.     Lab Results   Component Value Date    WBC 6.54 01/20/2025    HGB 8.9 (L) 01/20/2025    HCT 27.4 (L) 01/20/2025     (H) 01/20/2025     01/20/2025     Lab Results   Component Value Date    SODIUM 143 02/10/2025    K 4.9 02/10/2025     (H) 02/10/2025    CO2 27 02/10/2025    AGAP 4 02/10/2025    BUN 34 (H) 02/10/2025    CREATININE 1.52 (H) 02/10/2025    GLUC 86 02/10/2025    CALCIUM 8.3 (L) 02/10/2025    AST 18 02/10/2025    ALT 11 (L) 02/10/2025    ALKPHOS 58 02/10/2025    TP 4.3 (L) 02/10/2025    TBILI 0.2 (L) 02/10/2025    EGFR 50 (L) 02/10/2025     Review of Systems    Objective   Ht 6' (1.829 m)   Wt 93 kg (205 lb)   BMI 27.80 kg/m²      Physical Exam  Eyes:      Pupils: Pupils are equal, round, and reactive to light.   Cardiovascular:      Rate and Rhythm: Normal rate.   Pulmonary:      Effort: Pulmonary effort is normal.   Abdominal:      General: There is no distension.      Palpations: Abdomen is soft.      Tenderness: There is no abdominal tenderness.   Neurological:      Mental Status: He is alert.   Psychiatric:         Mood and Affect: Mood normal.

## 2025-02-11 ENCOUNTER — OFFICE VISIT (OUTPATIENT)
Age: 70
End: 2025-02-11
Payer: COMMERCIAL

## 2025-02-11 ENCOUNTER — TELEPHONE (OUTPATIENT)
Age: 70
End: 2025-02-11

## 2025-02-11 ENCOUNTER — PREP FOR PROCEDURE (OUTPATIENT)
Age: 70
End: 2025-02-11

## 2025-02-11 VITALS — BODY MASS INDEX: 27.77 KG/M2 | HEIGHT: 72 IN | WEIGHT: 205 LBS

## 2025-02-11 DIAGNOSIS — N04.9 NEPHROTIC SYNDROME: ICD-10-CM

## 2025-02-11 DIAGNOSIS — K62.5 RECTAL BLEEDING: Primary | ICD-10-CM

## 2025-02-11 DIAGNOSIS — K64.9 HEMORRHOIDS, UNSPECIFIED HEMORRHOID TYPE: ICD-10-CM

## 2025-02-11 PROCEDURE — 99244 OFF/OP CNSLTJ NEW/EST MOD 40: CPT | Performed by: COLON & RECTAL SURGERY

## 2025-02-11 RX ORDER — LOSARTAN POTASSIUM 50 MG/1
1 TABLET ORAL DAILY
COMMUNITY
Start: 2025-01-10

## 2025-02-11 RX ORDER — SEVELAMER CARBONATE 800 MG/1
TABLET, FILM COATED ORAL
COMMUNITY
Start: 2024-12-26

## 2025-02-11 RX ORDER — PROCHLORPERAZINE MALEATE 5 MG/1
5 TABLET ORAL
COMMUNITY

## 2025-02-11 NOTE — LETTER
Dear Debra Corwe PA-C,     Please advise the number of days Angel CA Jevon [birth year] can safely hold Eliquis prior to colonoscopy.    Date of Surgery: 2/19/25  Type of Surgery: colonoscopy     Please fax recommendations to [phone]:     ________________________________________________________________    ________________________________________________      Thank you,      Rufino Cabrera MD

## 2025-02-11 NOTE — LETTER
Angel CA Escoto  [address] [place] [id number]        Location:  [facility] - [address] [place] [address] [place] [address] - [facility] [place] [facility] - 1st Floor    Performing Physician: Rufino Woodruff MD      DATE OF PROCEDURE: February 19, 2025       The OR/GI Lab will contact you the evening prior to your procedure with your exact arrival time.    We kindly ask that you immediately notify us of any need to cancel or reschedule your procedure.      WEEK BEFORE THE PROCEDURE:  Do not take Iron tablets for one week  5 days prior to the appointment AVOID vegetables and fruits with skins or seeds, nuts, corn, popcorn, and whole grain breads.  Purchase: One (1) 238-gram container of Miralax (polyethylene glycol 3350), four (4) 5 mg Dulcolax (bisacodyl) tablets, and 64-ounces of Gatorade (sports drink) - no red, orange, or purple. These may be purchased at any pharmacy without a prescription. Generic products are permissible.  Arrange responsible transportation for day of the procedure.      DAY BEFORE THE PROCEDURE:  CLEAR liquids only for entire day prior. Nothing red, orange or purple.  You MAY have:  Soda  Water  Broth Gatorade  Jell-O  Popsicles Coffee/tea without  Milk/creamer     YOU MAY NOT HAVE:  Solid foods  Milk and milk products  Juice with pulp    BOWEL PREPARATION: Includes: One (1) 238-gram container of Miralax (polyethylene glycol 3350), four (4) 5 mg Dulcolax (bisacodyl) tablets, and 64-ounces of Gatorade (sports drink). Preparation may be refrigerated. Entire bowel prep should be completed.      Afternoon before the procedure (2:00 pm - 5:00 pm):  Take two (2) 5 mg Dulcolax laxative tablets.    Evening before the procedure (6:00 pm):  Mix entire container of Miralax with 64-ounces of Gatorade and shake until all medication is dissolved.  Begin drinking solution. Drink an eight (8) ounce cup every 10-15 minutes until you have consumed half (32 ounces) of the solution. Refrigerate  remaining solution.    Night before the procedure (8:00 pm):  Take two (2) 5 mg Dulcolax laxative tablets.    Beginning 5 hours before your procedure:  Drink the remaining amount of prepared solution (32 ounces). Drink an eight (8) ounce cup every 10-15 minutes until you have consumed the remaining solution.      Bowel prep should be completed 4 hours prior to procedure time.  NOTHING TO EAT OR DRINK AFTER MIDNIGHT -EXCEPT YOUR BOWEL PREP                                                                                                                                                                                DAY OF THE PROCEDURE:  You may brush your teeth.  Leave all jewelry at home. Take out any facial piercings, if able.  Please arrive for your procedure as indicated by the OR / GI Lab / Endoscopy Unit. The hospital will contact you the day before with your exact arrival time.  Make sure you have arranged ahead of time for a responsible adult (18 or older) to accompany and drive you home after the procedure. Please discuss any transportation concerns with our staff prior to your procedure.  The effects of the anesthesia can persist for 24 hours. After receiving the sedation, you must exercise caution before engaging in any activity that could harm yourself and others (such as driving a car). Do not make any important decisions or do not drink any alcoholic beverages during this time period.  After your procedure, you may have anything you’d like to eat or drink. You will probably want to start with something light. Please include plenty of fluids. Avoid items that cause gas such as sodas and salads.      SPECIAL INSTRUCTIONS:    For patients currently taking blood thinners and/or antiplatelet therapy our office will contact the prescribing provider. Our office will contact you with any required changes to your medication regimen.  Blood thinner (i.e. - Coumadin, Pradaxa, Lovenox, Xarelto, Eliquis)  You will  receive a call on suggested amount of days to hold.  Prescribed medications:  Do not stop your aspirin, or any of your other medications (unless instructed otherwise).  Take the rest of your prescribed medications with small sips of water at least 2 hours prior to your procedure.  NOTHING TO EAT OR DRINK (INCLUDING CHEWING GUM) AFTER 12 MIDNIGHT PRIOR TO YOUR PROCEDURE EXCEPT YOUR BOWEL PREP.

## 2025-02-11 NOTE — LETTER
Dear Debra Crowe PA-C,     Please advise the number of days Angel CA Jevon [birth year] can safely hold Eliquis prior to colonoscopy.    Date of Surgery: 2/19/25  Type of Surgery: colonoscopy       Thank you,      Rufino Cabrera MD

## 2025-02-11 NOTE — ASSESSMENT & PLAN NOTE
Angel is a 69-year-old male, he is in ongoing therapy at [facility] for mantle cell lymphoma, recent PET/CT with continued adenopathy, he is likely being enrolled in [place] trial for this.    In November/December was having rectal bleeding, question of internal hemorrhoids at outside hospital for possibility of hemorrhoidectomy.  The majority of the symptoms have resolved however he has hemoglobin of 9.8 on last check.    He is requesting colonoscopy at this time which I have discussed with him I would recommend given the last was 9/2018, normal.    We will endeavor to schedule colonoscopy prior to his start of trial as he is currently between chemo/immunotherapies and this is a reasonable window, will perform anorectal exam at that time as well.

## 2025-02-11 NOTE — LETTER
Dear Debra Crowe PA-C,     Please advise the number of days Angel CA Jevon [birth year] can safely hold Eliquis prior to colonoscopy.    Date of Surgery: 2/19/25  Type of Surgery: colonoscopy       Please fax recommendations to [phone]:    _____________________________________________________________________________    _____________________________________________________________      Thank you,      Rufino Cabrera MD

## 2025-02-11 NOTE — LETTER
Aruna Isaac,    Please complete the following clearance form for our mutual patient:    Angel CA Escoto 1955    Date of Procedure: 2/19/2025       Type of Procedure: Colonoscopy         Please indicate if patient is safe to proceed with planned MiraLax/Doculax prep and colonoscopy on 2/19/2025.    Thank you.      Comments:______________________________________________________________________________    _______________________________________________________________________________________    ________________________________________________________________________________________    ________________________________________________________________________________________      ______________________________   __________________________  Physician Signature      Date       Please fax completed response to [phone]. Thank you.

## 2025-02-12 ENCOUNTER — TELEPHONE (OUTPATIENT)
Dept: NEPHROLOGY | Facility: HOSPITAL | Age: 70
End: 2025-02-12

## 2025-02-12 NOTE — TELEPHONE ENCOUNTER
Patient called the RX Refill Line. Message is being forwarded to the office.     Patient is requesting   apixaban (ELIQUIS) tablet 5 mg sent to [id number]. Medication was prescribed in the hospital and patient will be out. Patients primary care denied filling and patient doesn't know what to do to get this filled was hoping Dr Vazquez will fill it for him     Please contact patient at [phone]

## 2025-02-13 NOTE — TELEPHONE ENCOUNTER
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 He has an established oncologist at [facility] who should be overlooking and managing this diagnosis. The oncologist refilling this medication is the most appropriate. That way if medication adjustment or lengths of treatment need to change the appropriate person is monitoring.  If there are any issues let me know. Thanks.

## 2025-02-13 NOTE — TELEPHONE ENCOUNTER
Spoke to pt regarding the following message per Dr. 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 He has an established oncologist at [facility] who should be overlooking and managing this diagnosis. The oncologist refilling this medication is the most appropriate. That way if medication adjustment or lengths of treatment need to change the appropriate person is monitoring.  If there are any issues let me know. Thanks.     Pt verbalized understanding. No further actions needed.

## 2025-02-13 NOTE — TELEPHONE ENCOUNTER
"Pt confirms he is taking Eliquis and not Lovenox; stated Eliquis is managed by [facility], however scanned document on 2/11/2025 noted \"not prescribed through the ofc\", as such blood thinner hold was faxed to hem/onc provider at [facility], Dr. Alves.     As per pt's request a clearance has also been sent to nephrologist, Dr. Isaac; pt has upcoming appt w/ Dr. Isaac on 2/17/2025.        "

## 2025-02-17 NOTE — TELEPHONE ENCOUNTER
Called Dr. Alves's office to confirm Eliquis hold. Waiting on response from care team. Provided call back number as well as fax number.

## 2025-02-17 NOTE — TELEPHONE ENCOUNTER
Patients GI provider:  Dr. Cabrera    Number to return call: [phone]    Reason for call: Pt called in to cancel procedure due to having a clot in his lung and will call back to reschedule in 6 months to reschedule procedure.     Scheduled procedure/appointment date if applicable: Apt/procedure n/a

## 2025-02-17 NOTE — TELEPHONE ENCOUNTER
Pt calling in, he has colonoscopy scheduled for 2/19 and is wondering if he has to stop his eliquis and if so for how long. I do not see any med clearance in chart. I advised I would give pt a call back. He reports not taking his eliquis dose today yet.

## 2025-02-18 NOTE — TELEPHONE ENCOUNTER
Pt called to confirm his procedure was cancelled because he received a confirmation call from the automated system. Advised pt, procedure was cancelled per  his request on 2/17/25

## 2025-03-04 ENCOUNTER — OFFICE VISIT (OUTPATIENT)
Age: 70
End: 2025-03-04
Payer: COMMERCIAL

## 2025-03-04 VITALS
HEIGHT: 72 IN | BODY MASS INDEX: 27.22 KG/M2 | WEIGHT: 201 LBS | DIASTOLIC BLOOD PRESSURE: 84 MMHG | HEART RATE: 77 BPM | OXYGEN SATURATION: 97 % | TEMPERATURE: 98.2 F | SYSTOLIC BLOOD PRESSURE: 138 MMHG

## 2025-03-04 DIAGNOSIS — N04.9 NEPHROTIC SYNDROME: ICD-10-CM

## 2025-03-04 DIAGNOSIS — I26.99 PULMONARY EMBOLISM (HCC): Primary | ICD-10-CM

## 2025-03-04 DIAGNOSIS — C83.10 MANTLE CELL LYMPHOMA, UNSPECIFIED BODY REGION (HCC): ICD-10-CM

## 2025-03-04 PROCEDURE — 99214 OFFICE O/P EST MOD 30 MIN: CPT | Performed by: PHYSICIAN ASSISTANT

## 2025-03-04 RX ORDER — NIFEDIPINE 30 MG
30 TABLET, EXTENDED RELEASE ORAL DAILY
COMMUNITY
Start: 2025-02-17

## 2025-03-04 NOTE — PROGRESS NOTES
Follow-up  Visit - Pulmonary Medicine   Name: Angel Escoto      : 1955      MRN: [id number]  Encounter Provider: Naila Schmid PA-C  Encounter Date: 3/4/2025   Encounter department: [facility]  :  Assessment & Plan  Pulmonary embolism (HCC)  Patient was hospitalized in December  He was evaluated by our Pulmonary and Oncology teams and instructed to follow-up with his Oncologist at [facility] for long term management  Appears there was some communication issue.. he wasn't sure who to go to for refills on his medication  I reviewed most recent visit with his Oncologist 2/10 they recommended continuing Eliquis which is currently being filled by his PCP  Telephone encounter from yesterday shows his Oncologist recommends lifelong anticoagulation which I also agree with   I discussed with patient as long as his anticoagulation is being managed by other providers he does not need to follow-up with us regarding the PE as he is not having any respiratory symptoms at all  No hx of chronic pulmonary disease. Non smoker. Also reviewed his CT scan and there were no significant pulmonary abnormalities noted.  He may follow-up with us on PRN basis    Orders:    Ambulatory referral to Pulmonology    Mantle cell lymphoma, unspecified body region (HCC)  Starting a clinical trial soon  Close Oncology follow-up       Nephrotic syndrome  Following closely with Nephrology  Chronic LE edema bilaterally         No follow-ups on file.    History of Present Illness   Angel Escoto is a very pleasant 69 y.o. male who presents for follow-up regarding PE. He remains on Eliquis daily and tolerating well. No issues with dyspnea or chest pain. No cough. No bronchodilator needs. Denies ever having any lung issues prior to the PE. Never was a smoker besides having 1 cigarette when he was 20 years old.     Review of Systems   Respiratory:  Negative for shortness of breath.    Cardiovascular:  Negative  for chest pain.   Musculoskeletal:  Positive for back pain.   All other systems reviewed and are negative.      Aside from what is mentioned in the HPI, ROS is otherwise negative    Past Medical History   Past Medical History:   Diagnosis Date    Acute nephrotic syndrome     Disease of thyroid gland     DVT (deep venous thrombosis) (HCC)     Mantle cell lymphoma (HCC)      Past Surgical History:   Procedure Laterality Date    ABLATION OF DYSRHYTHMIC FOCUS      IR TUNNELED DIALYSIS CATHETER REMOVAL  11/13/2024     History reviewed. No pertinent family history.   reports that he has never smoked. He has never used smokeless tobacco. He reports current alcohol use. He reports that he does not use drugs.  Current Outpatient Medications   Medication Instructions    acetaminophen (TYLENOL) 325 mg tablet Every 6 hours PRN    apixaban (ELIQUIS) 5 mg, 2 times daily    calcium carbonate (TUMS) 500 mg chewable tablet 1 tablet, Daily    cholecalciferol (VITAMIN D3) 2,000 Units, Oral, Daily    docusate sodium (COLACE) 100 mg, Oral, 2 times daily    DULoxetine (CYMBALTA) 20 mg, Daily    enoxaparin (LOVENOX) 1 mg/kg, Subcutaneous, Every 12 hours scheduled    famotidine (PEPCID) 20 mg, Oral, Daily    levothyroxine 100 mcg tablet TAKE 1 TABLET BY MOUTH DAILY    losartan (COZAAR) 50 mg tablet 1 tablet, Daily    losartan (COZAAR) 25 mg, Oral, Daily    MAGNESIUM PO 1 tablet, Daily    NIFEdipine ER (ADALAT CC) 30 mg, Daily    predniSONE 1 mg tablet 1 tablet, Daily    prochlorperazine (COMPAZINE) 5 mg    senna (SENOKOT) 8.6 mg, As needed    sevelamer carbonate (RENVELA) 800 mg tablet     tadalafil (CIALIS) 5 mg, Daily    torsemide (DEMADEX) 60 mg, Oral, Daily    traMADol (ULTRAM) 50 mg, Oral, Every 6 hours PRN     Allergies   Allergen Reactions    Hydromorphone Hallucinations, Other (See Comments), Delirium, Confusion and Visual Disturbance     hallucinosis    Patient saw things that were not there    Other reaction(s): Delirium,  Hallucinations, Mental Status Changes, Other (see comments), Visual Disturbance   hallucinosis   hallucinosis   hallucinosis   hallucinosis   Patient saw things that were not there    hallucinosis   hallucinosis   Patient saw things that were not there    hallucinosis   hallucinosis   hallucinosis   hallucinosis   Patient saw things that were not there    hallucinosis   hallucinosis   hallucinosis   hallucinosis   hallucinosis   hallucinosis   Patient saw things that were not there    hallucinosis   hallucinosis   Patient saw things that were not there    hallucinosis    Heparin Other (See Comments)     Develops nodule under skin after injection    Medical Tape Rash     Itches    Skin irritate with EKG leads         Medical History Reviewed by provider this encounter:     .    Objective   /84 (BP Location: Left arm, Patient Position: Sitting, Cuff Size: Large)   Pulse 77   Temp 98.2 °F (36.8 °C) (Tympanic)   Ht 6' (1.829 m)   Wt 91.2 kg (201 lb)   SpO2 97%   BMI 27.26 kg/m²     Physical Exam  Vitals reviewed.   Constitutional:       General: He is not in acute distress.     Appearance: He is not toxic-appearing.   HENT:      Head: Normocephalic and atraumatic.   Eyes:      General: No scleral icterus.  Cardiovascular:      Rate and Rhythm: Normal rate and regular rhythm.   Pulmonary:      Effort: Pulmonary effort is normal.      Breath sounds: Normal breath sounds.   Musculoskeletal:         General: Swelling present.   Skin:     General: Skin is warm and dry.   Neurological:      General: No focal deficit present.      Mental Status: He is alert. Mental status is at baseline.   Psychiatric:         Mood and Affect: Mood normal.         Behavior: Behavior normal.         Naila Schmid PA-C

## 2025-03-15 ENCOUNTER — APPOINTMENT (EMERGENCY)
Dept: RADIOLOGY | Facility: HOSPITAL | Age: 70
End: 2025-03-15
Payer: COMMERCIAL

## 2025-03-15 ENCOUNTER — HOSPITAL ENCOUNTER (OUTPATIENT)
Facility: HOSPITAL | Age: 70
Setting detail: OBSERVATION
Discharge: HOME | End: 2025-03-16
Attending: EMERGENCY MEDICINE | Admitting: INTERNAL MEDICINE
Payer: COMMERCIAL

## 2025-03-15 DIAGNOSIS — R41.0 CONFUSION: ICD-10-CM

## 2025-03-15 DIAGNOSIS — C83.10 MANTLE CELL LYMPHOMA (HCC): ICD-10-CM

## 2025-03-15 DIAGNOSIS — Z86.711 HISTORY OF PULMONARY EMBOLISM: ICD-10-CM

## 2025-03-15 DIAGNOSIS — R55 SYNCOPE: Primary | ICD-10-CM

## 2025-03-15 PROBLEM — R94.31 PROLONGED QT INTERVAL: Status: ACTIVE | Noted: 2025-03-15

## 2025-03-15 PROBLEM — D72.829 LEUKOCYTOSIS: Status: ACTIVE | Noted: 2025-03-15

## 2025-03-15 PROBLEM — I10 HYPERTENSION: Status: ACTIVE | Noted: 2025-03-15

## 2025-03-15 LAB
2HR DELTA HS TROPONIN: 1 NG/L
4HR DELTA HS TROPONIN: 2 NG/L
ALBUMIN SERPL BCG-MCNC: 2.4 G/DL (ref 3.5–5)
ALP SERPL-CCNC: 51 U/L (ref 34–104)
ALT SERPL W P-5'-P-CCNC: 12 U/L (ref 7–52)
ANION GAP SERPL CALCULATED.3IONS-SCNC: 7 MMOL/L (ref 4–13)
AST SERPL W P-5'-P-CCNC: 16 U/L (ref 13–39)
ATRIAL RATE: 86 BPM
BACTERIA UR QL AUTO: ABNORMAL /HPF
BASOPHILS # BLD AUTO: 0.09 THOUSANDS/ÂΜL (ref 0–0.1)
BASOPHILS NFR BLD AUTO: 0 % (ref 0–1)
BILIRUB SERPL-MCNC: 0.6 MG/DL (ref 0.2–1)
BILIRUB UR QL STRIP: NEGATIVE
BNP SERPL-MCNC: 21 PG/ML (ref 0–100)
BUN SERPL-MCNC: 32 MG/DL (ref 5–25)
CALCIUM ALBUM COR SERPL-MCNC: 9 MG/DL (ref 8.3–10.1)
CALCIUM SERPL-MCNC: 7.7 MG/DL (ref 8.4–10.2)
CARDIAC TROPONIN I PNL SERPL HS: 22 NG/L (ref ?–50)
CARDIAC TROPONIN I PNL SERPL HS: 23 NG/L (ref ?–50)
CARDIAC TROPONIN I PNL SERPL HS: 24 NG/L (ref ?–50)
CHLORIDE SERPL-SCNC: 101 MMOL/L (ref 96–108)
CLARITY UR: CLEAR
CO2 SERPL-SCNC: 29 MMOL/L (ref 21–32)
COLOR UR: ABNORMAL
CREAT SERPL-MCNC: 1.76 MG/DL (ref 0.6–1.3)
CRP SERPL QL: 2.6 MG/L
EOSINOPHIL # BLD AUTO: 1.34 THOUSAND/ÂΜL (ref 0–0.61)
EOSINOPHIL NFR BLD AUTO: 5 % (ref 0–6)
ERYTHROCYTE [DISTWIDTH] IN BLOOD BY AUTOMATED COUNT: 11.9 % (ref 11.6–15.1)
FLUAV RNA RESP QL NAA+PROBE: NEGATIVE
FLUBV RNA RESP QL NAA+PROBE: NEGATIVE
GFR SERPL CREATININE-BSD FRML MDRD: 38 ML/MIN/1.73SQ M
GLUCOSE SERPL-MCNC: 92 MG/DL (ref 65–140)
GLUCOSE UR STRIP-MCNC: ABNORMAL MG/DL
HCT VFR BLD AUTO: 33.9 % (ref 36.5–49.3)
HGB BLD-MCNC: 11.5 G/DL (ref 12–17)
HGB UR QL STRIP.AUTO: ABNORMAL
IMM GRANULOCYTES # BLD AUTO: 0.26 THOUSAND/UL (ref 0–0.2)
IMM GRANULOCYTES NFR BLD AUTO: 1 % (ref 0–2)
INR PPP: 1.13 (ref 0.85–1.19)
KETONES UR STRIP-MCNC: NEGATIVE MG/DL
LEUKOCYTE ESTERASE UR QL STRIP: NEGATIVE
LYMPHOCYTES # BLD AUTO: 0.99 THOUSANDS/ÂΜL (ref 0.6–4.47)
LYMPHOCYTES NFR BLD AUTO: 4 % (ref 14–44)
MAGNESIUM SERPL-MCNC: 2 MG/DL (ref 1.9–2.7)
MCH RBC QN AUTO: 32.4 PG (ref 26.8–34.3)
MCHC RBC AUTO-ENTMCNC: 33.9 G/DL (ref 31.4–37.4)
MCV RBC AUTO: 96 FL (ref 82–98)
MONOCYTES # BLD AUTO: 0.89 THOUSAND/ÂΜL (ref 0.17–1.22)
MONOCYTES NFR BLD AUTO: 4 % (ref 4–12)
NEUTROPHILS # BLD AUTO: 22.03 THOUSANDS/ÂΜL (ref 1.85–7.62)
NEUTS SEG NFR BLD AUTO: 86 % (ref 43–75)
NITRITE UR QL STRIP: NEGATIVE
NON-SQ EPI CELLS URNS QL MICRO: ABNORMAL /HPF
NRBC BLD AUTO-RTO: 0 /100 WBCS
P AXIS: 75 DEGREES
PH UR STRIP.AUTO: 8 [PH]
PLATELET # BLD AUTO: 236 THOUSANDS/UL (ref 149–390)
PMV BLD AUTO: 10.6 FL (ref 8.9–12.7)
POTASSIUM SERPL-SCNC: 3.2 MMOL/L (ref 3.5–5.3)
PR INTERVAL: 186 MS
PROCALCITONIN SERPL-MCNC: 0.1 NG/ML
PROT SERPL-MCNC: 4.6 G/DL (ref 6.4–8.4)
PROT UR STRIP-MCNC: ABNORMAL MG/DL
PROTHROMBIN TIME: 14.8 SECONDS (ref 12.3–15)
QRS AXIS: 41 DEGREES
QRSD INTERVAL: 96 MS
QT INTERVAL: 492 MS
QTC INTERVAL: 589 MS
RBC # BLD AUTO: 3.55 MILLION/UL (ref 3.88–5.62)
RBC #/AREA URNS AUTO: ABNORMAL /HPF
RSV RNA RESP QL NAA+PROBE: NEGATIVE
SARS-COV-2 RNA RESP QL NAA+PROBE: NEGATIVE
SODIUM SERPL-SCNC: 137 MMOL/L (ref 135–147)
SP GR UR STRIP.AUTO: 1.03 (ref 1–1.03)
T WAVE AXIS: 74 DEGREES
TSH SERPL DL<=0.05 MIU/L-ACNC: 9.6 UIU/ML (ref 0.45–4.5)
UROBILINOGEN UR STRIP-ACNC: <2 MG/DL
VENTRICULAR RATE: 86 BPM
WBC # BLD AUTO: 25.6 THOUSAND/UL (ref 4.31–10.16)
WBC #/AREA URNS AUTO: ABNORMAL /HPF

## 2025-03-15 PROCEDURE — 0241U HB NFCT DS VIR RESP RNA 4 TRGT: CPT | Performed by: STUDENT IN AN ORGANIZED HEALTH CARE EDUCATION/TRAINING PROGRAM

## 2025-03-15 PROCEDURE — 84443 ASSAY THYROID STIM HORMONE: CPT | Performed by: STUDENT IN AN ORGANIZED HEALTH CARE EDUCATION/TRAINING PROGRAM

## 2025-03-15 PROCEDURE — 83735 ASSAY OF MAGNESIUM: CPT | Performed by: STUDENT IN AN ORGANIZED HEALTH CARE EDUCATION/TRAINING PROGRAM

## 2025-03-15 PROCEDURE — 99285 EMERGENCY DEPT VISIT HI MDM: CPT | Performed by: EMERGENCY MEDICINE

## 2025-03-15 PROCEDURE — 99223 1ST HOSP IP/OBS HIGH 75: CPT | Performed by: INTERNAL MEDICINE

## 2025-03-15 PROCEDURE — 80053 COMPREHEN METABOLIC PANEL: CPT | Performed by: STUDENT IN AN ORGANIZED HEALTH CARE EDUCATION/TRAINING PROGRAM

## 2025-03-15 PROCEDURE — 93010 ELECTROCARDIOGRAM REPORT: CPT | Performed by: INTERNAL MEDICINE

## 2025-03-15 PROCEDURE — 71046 X-RAY EXAM CHEST 2 VIEWS: CPT

## 2025-03-15 PROCEDURE — 85025 COMPLETE CBC W/AUTO DIFF WBC: CPT | Performed by: STUDENT IN AN ORGANIZED HEALTH CARE EDUCATION/TRAINING PROGRAM

## 2025-03-15 PROCEDURE — 93005 ELECTROCARDIOGRAM TRACING: CPT

## 2025-03-15 PROCEDURE — 81001 URINALYSIS AUTO W/SCOPE: CPT | Performed by: STUDENT IN AN ORGANIZED HEALTH CARE EDUCATION/TRAINING PROGRAM

## 2025-03-15 PROCEDURE — 70498 CT ANGIOGRAPHY NECK: CPT

## 2025-03-15 PROCEDURE — 99285 EMERGENCY DEPT VISIT HI MDM: CPT

## 2025-03-15 PROCEDURE — 86140 C-REACTIVE PROTEIN: CPT | Performed by: STUDENT IN AN ORGANIZED HEALTH CARE EDUCATION/TRAINING PROGRAM

## 2025-03-15 PROCEDURE — 96367 TX/PROPH/DG ADDL SEQ IV INF: CPT

## 2025-03-15 PROCEDURE — 36415 COLL VENOUS BLD VENIPUNCTURE: CPT | Performed by: STUDENT IN AN ORGANIZED HEALTH CARE EDUCATION/TRAINING PROGRAM

## 2025-03-15 PROCEDURE — 85610 PROTHROMBIN TIME: CPT | Performed by: STUDENT IN AN ORGANIZED HEALTH CARE EDUCATION/TRAINING PROGRAM

## 2025-03-15 PROCEDURE — 70496 CT ANGIOGRAPHY HEAD: CPT

## 2025-03-15 PROCEDURE — 96366 THER/PROPH/DIAG IV INF ADDON: CPT

## 2025-03-15 PROCEDURE — 84145 PROCALCITONIN (PCT): CPT | Performed by: STUDENT IN AN ORGANIZED HEALTH CARE EDUCATION/TRAINING PROGRAM

## 2025-03-15 PROCEDURE — 87040 BLOOD CULTURE FOR BACTERIA: CPT | Performed by: STUDENT IN AN ORGANIZED HEALTH CARE EDUCATION/TRAINING PROGRAM

## 2025-03-15 PROCEDURE — 83880 ASSAY OF NATRIURETIC PEPTIDE: CPT | Performed by: STUDENT IN AN ORGANIZED HEALTH CARE EDUCATION/TRAINING PROGRAM

## 2025-03-15 PROCEDURE — 96365 THER/PROPH/DIAG IV INF INIT: CPT

## 2025-03-15 PROCEDURE — 84484 ASSAY OF TROPONIN QUANT: CPT | Performed by: STUDENT IN AN ORGANIZED HEALTH CARE EDUCATION/TRAINING PROGRAM

## 2025-03-15 PROCEDURE — 87077 CULTURE AEROBIC IDENTIFY: CPT | Performed by: STUDENT IN AN ORGANIZED HEALTH CARE EDUCATION/TRAINING PROGRAM

## 2025-03-15 RX ORDER — OXYCODONE HYDROCHLORIDE 5 MG/1
5 TABLET ORAL ONCE
Refills: 0 | Status: COMPLETED | OUTPATIENT
Start: 2025-03-15 | End: 2025-03-15

## 2025-03-15 RX ORDER — LENALIDOMIDE 5 MG/1
10 CAPSULE ORAL DAILY
Status: DISCONTINUED | OUTPATIENT
Start: 2025-03-16 | End: 2025-03-16 | Stop reason: HOSPADM

## 2025-03-15 RX ORDER — TORSEMIDE 20 MG/1
40 TABLET ORAL DAILY
Status: DISCONTINUED | OUTPATIENT
Start: 2025-03-16 | End: 2025-03-16 | Stop reason: HOSPADM

## 2025-03-15 RX ORDER — TRAMADOL HYDROCHLORIDE 50 MG/1
50 TABLET ORAL EVERY 6 HOURS PRN
Status: DISCONTINUED | OUTPATIENT
Start: 2025-03-15 | End: 2025-03-16 | Stop reason: HOSPADM

## 2025-03-15 RX ORDER — LOSARTAN POTASSIUM 50 MG/1
50 TABLET ORAL DAILY
Status: DISCONTINUED | OUTPATIENT
Start: 2025-03-16 | End: 2025-03-16 | Stop reason: HOSPADM

## 2025-03-15 RX ORDER — ALLOPURINOL 300 MG/1
300 TABLET ORAL DAILY
Status: DISCONTINUED | OUTPATIENT
Start: 2025-03-15 | End: 2025-03-16 | Stop reason: HOSPADM

## 2025-03-15 RX ORDER — SODIUM CHLORIDE 9 MG/ML
100 INJECTION, SOLUTION INTRAVENOUS CONTINUOUS
Status: DISCONTINUED | OUTPATIENT
Start: 2025-03-15 | End: 2025-03-16

## 2025-03-15 RX ORDER — LEVOTHYROXINE SODIUM 100 UG/1
100 TABLET ORAL DAILY
Status: DISCONTINUED | OUTPATIENT
Start: 2025-03-15 | End: 2025-03-16 | Stop reason: HOSPADM

## 2025-03-15 RX ORDER — CALCIUM GLUCONATE 20 MG/ML
2 INJECTION, SOLUTION INTRAVENOUS ONCE
Status: COMPLETED | OUTPATIENT
Start: 2025-03-15 | End: 2025-03-15

## 2025-03-15 RX ORDER — NIFEDIPINE 30 MG/1
30 TABLET, EXTENDED RELEASE ORAL DAILY
Status: DISCONTINUED | OUTPATIENT
Start: 2025-03-15 | End: 2025-03-16

## 2025-03-15 RX ORDER — CALCIUM CARBONATE 500 MG/1
500 TABLET, CHEWABLE ORAL DAILY
Status: DISCONTINUED | OUTPATIENT
Start: 2025-03-15 | End: 2025-03-16 | Stop reason: HOSPADM

## 2025-03-15 RX ORDER — DOCUSATE SODIUM 100 MG/1
100 CAPSULE, LIQUID FILLED ORAL 2 TIMES DAILY
Status: DISCONTINUED | OUTPATIENT
Start: 2025-03-15 | End: 2025-03-16 | Stop reason: HOSPADM

## 2025-03-15 RX ORDER — ALLOPURINOL 100 MG/1
100 TABLET ORAL DAILY
Status: DISCONTINUED | OUTPATIENT
Start: 2025-03-15 | End: 2025-03-15

## 2025-03-15 RX ORDER — POTASSIUM CHLORIDE 1500 MG/1
40 TABLET, EXTENDED RELEASE ORAL ONCE
Status: COMPLETED | OUTPATIENT
Start: 2025-03-15 | End: 2025-03-15

## 2025-03-15 RX ORDER — DULOXETIN HYDROCHLORIDE 20 MG/1
20 CAPSULE, DELAYED RELEASE ORAL DAILY
Status: DISCONTINUED | OUTPATIENT
Start: 2025-03-15 | End: 2025-03-16 | Stop reason: HOSPADM

## 2025-03-15 RX ORDER — ACETAMINOPHEN 325 MG/1
650 TABLET ORAL EVERY 6 HOURS PRN
Status: DISCONTINUED | OUTPATIENT
Start: 2025-03-15 | End: 2025-03-16 | Stop reason: HOSPADM

## 2025-03-15 RX ADMIN — TRAMADOL HYDROCHLORIDE 50 MG: 50 TABLET, COATED ORAL at 16:38

## 2025-03-15 RX ADMIN — DOCUSATE SODIUM 100 MG: 100 CAPSULE, LIQUID FILLED ORAL at 16:39

## 2025-03-15 RX ADMIN — POTASSIUM CHLORIDE 40 MEQ: 1500 TABLET, EXTENDED RELEASE ORAL at 05:30

## 2025-03-15 RX ADMIN — CALCIUM GLUCONATE 2 G: 20 INJECTION, SOLUTION INTRAVENOUS at 05:36

## 2025-03-15 RX ADMIN — IOHEXOL 85 ML: 350 INJECTION, SOLUTION INTRAVENOUS at 05:25

## 2025-03-15 RX ADMIN — CALCIUM CARBONATE (ANTACID) CHEW TAB 500 MG 500 MG: 500 CHEW TAB at 12:53

## 2025-03-15 RX ADMIN — SODIUM CHLORIDE 100 ML/HR: 0.9 INJECTION, SOLUTION INTRAVENOUS at 12:53

## 2025-03-15 RX ADMIN — Medication 2000 UNITS: at 12:53

## 2025-03-15 RX ADMIN — VANCOMYCIN HYDROCHLORIDE 2000 MG: 1 INJECTION, POWDER, LYOPHILIZED, FOR SOLUTION INTRAVENOUS at 05:52

## 2025-03-15 RX ADMIN — DOCUSATE SODIUM 100 MG: 100 CAPSULE, LIQUID FILLED ORAL at 12:53

## 2025-03-15 RX ADMIN — APIXABAN 5 MG: 5 TABLET, FILM COATED ORAL at 12:53

## 2025-03-15 RX ADMIN — APIXABAN 5 MG: 5 TABLET, FILM COATED ORAL at 16:38

## 2025-03-15 RX ADMIN — ALLOPURINOL 300 MG: 300 TABLET ORAL at 12:05

## 2025-03-15 RX ADMIN — Medication 2000 MG: at 05:30

## 2025-03-15 RX ADMIN — OXYCODONE HYDROCHLORIDE 5 MG: 5 TABLET ORAL at 05:30

## 2025-03-15 NOTE — H&P
H&P - Hospitalist   Name: Angel Escoto 69 y.o. male I MRN: [id number]  Unit/Bed#: ED 18 I Date of Admission: 3/15/2025   Date of Service: 3/15/2025 I Hospital Day: 0     Assessment & Plan  Syncope  Patient is a 69-year-old male with a past medical history of mantle cell lymphoma, CKD stage IV, minimal-change disease, nephrotic syndrome in the setting of mantle cell lymphoma, history of PE, hypertension who presented with 2 syncopal episodes yesterday.  Patient started clinical trial for mantle cell lymphoma 3/6/2025, he has been taking high-dose steroids for 7 days, it was making him anxious, he slept about 2 hours daily for the past 7 days.  Yesterday while standing up twice he felt really lightheaded initially at 12 pm, he accidentally urinated himself at that time, the lightheadedness lasted for about 2 to 3 minutes.  Then around 3 PM again while standing up developed severe lightheadedness  and he passed out for a few seconds  Suspect orthostatic hypotension, generalized fatigue from sleep deprivation for the past 7 days, last oral intake.  Denies chest pain palpitation shortness of breath  He has CKD stage IV, creatinine at baseline, potassium 3.2, magnesium 2.0, procalcitonin negative, troponin x 2 negative, COVID/flu/RSV negative, TSH slightly elevated, hemoglobin stable.  No focal neurologic deficit  Found to have prolonged QT at 589  CTA head and neck showed no evidence of acute pathology  Will start gentle hydration with normal saline at 100 cc/h for 1 L  Check orthostatic vitals  Monitor on telemetry  Leukocytosis  WBC 25 suspect secondary to filgrastim which he received 3/14/2025  He was also on prednisone 100 mg daily from 3/7/2025 to 3/13/2025  He is afebrile, procalcitonin negative  He denies any flulike symptoms recently, abdominal pain diarrhea or dysuria, no open wounds  Received cefepime and vancomycin, hold off further antibiotics and monitor  Follow-up blood cultures  Repeat labs  tomorrow  Mantle cell lymphoma (HCC)  Follows [facility] heme-onc, [facility], currently in clinical trial  He started his clinical trial 3/6/2025  Received obinutuzumab on 3/7/2025 and 3/8/2025  He was on prednisone 100 mg daily from 3/7/2025 to 3/13/2025  Received a dose of filgrastim 3/14/2025  He is currently on ibrutinib 560 mg daily, taking it around 8 AM every morning, has to take it at the same time every day  On Revlimid 10 mg daily, also taking it at 8 AM every morning, has to take it at the same time every day  Ibrutinib and Revlimid sent to pharmacy to continue medications  On Bactrim Monday Wednesday Friday    Prolonged QT interval  EKG with nonspecific ST-T changes and   Magnesium 2.0  Received a dose of calcium  Monitor on telemetry  Avoid QT prolonging agents  Hypothyroid  Home regimen is levothyroxine 100 mcg daily  TSH 9.59  Check free T4  Recheck thyroid panel in 4 to 6 weeks as outpatient  Nephrotic syndrome  History of minimal-change disease, nephrotic syndrome thought to be secondary to mantle cell, he was on Rituxan and prednisone through [facility]  Kidney function stable  Anemia  Hemoglobin is slightly above his baseline, per wife he has been having labs through clinical trials and it has been increasing lately  CKD (chronic kidney disease) stage 4, GFR 15-29 ml/min ([facility])  Lab Results   Component Value Date    EGFR 38 03/15/2025    EGFR 50 (L) 02/10/2025    EGFR 47 01/03/2025    CREATININE 1.76 (H) 03/15/2025    CREATININE 1.52 (H) 02/10/2025    CREATININE 1.50 (H) 01/03/2025   Creatinine currently at baseline  Continue to monitor  History of pulmonary embolism  Diagnosed in December  On lifelong Eliquis  Hypertension  Continue losartan 50 mg daily, Procardia 30 mg daily and torsemide 40 mg daily  Monitor BP      VTE Pharmacologic Prophylaxis: VTE Score: 8 High Risk (Score >/= 5) - Pharmacological DVT Prophylaxis Ordered: apixaban (Eliquis). Sequential Compression Devices Ordered.  Code Status:  Level 1 - Full Code   Discussion with family: Updated  (wife) at bedside.    Anticipated Length of Stay: Patient will be admitted on an observation basis with an anticipated length of stay of less than 2 midnights secondary to syncope.    History of Present Illness   Chief Complaint: Syncope    Angel Escoto is a 69 y.o. male with a PMH of mantle cell lymphoma, CKD stage IV, minimal-change disease and nephrotic syndrome proteinuria in the setting of mantle cell lymphoma, history of PE, hypertension who presents with 2 syncopal episodes yesterday.  Patient started clinical trial through [facility] 3/6/2025 for mantle cell lymphoma.  He received obinutuzumab on 3/7/2025 and 3/8/2025.  3/11/2025 started prednisone 100 mg daily for 7 days.  He is on ibrutinib and Revlimid currently.  Received a dose of filgrastim yesterday.  Since he was enrolled in clinical trial most likely from high-dose of steroids he did not sleep well, he sleeps maybe 2 to 3 hours a day, he has been very anxious.  Yesterday he was working sitting at his desk, when he stood up he felt lightheaded it lasted for a few minutes but he accidentally urinated himself.  Then later around 3 PM while getting out of the car when he stood up he became very lightheaded, he had a presyncopal episode for a few minutes and then lost his consciousness that lasted for a few seconds.  In the middle of the night wife noticed that he is a little bit confused, but he took a dose of Benadryl prior to that, this morning they brought him to the ED for further evaluation  Denies any chest pain, palpitation or shortness of breath.  No weakness or numbness in extremities.    Review of Systems   Constitutional:  Positive for activity change, appetite change and fatigue. Negative for chills and fever.   Respiratory:  Negative for shortness of breath.    Cardiovascular:  Negative for chest pain, palpitations and leg swelling.   Gastrointestinal:  Negative for abdominal  pain, constipation, diarrhea, nausea and vomiting.   Genitourinary:  Negative for dysuria.   Musculoskeletal: Negative.    Skin: Negative.    Neurological:  Positive for syncope and light-headedness.   Hematological:  Negative for adenopathy. Does not bruise/bleed easily.   Psychiatric/Behavioral: Negative.         Historical Information   Past Medical History:   Diagnosis Date    Acute nephrotic syndrome     Disease of thyroid gland     DVT (deep venous thrombosis) (HCC)     Mantle cell lymphoma (HCC)      Past Surgical History:   Procedure Laterality Date    ABLATION OF DYSRHYTHMIC FOCUS      IR TUNNELED DIALYSIS CATHETER REMOVAL  11/13/2024     Social History     Tobacco Use    Smoking status: Never    Smokeless tobacco: Never   Vaping Use    Vaping status: Never Used   Substance and Sexual Activity    Alcohol use: Yes     Comment: rare    Drug use: Never    Sexual activity: Not on file     E-Cigarette/Vaping    E-Cigarette Use Never User      E-Cigarette/Vaping Substances    Nicotine No     THC No     CBD No     Flavoring No     Other No     Unknown No      Family history non-contributory  Social History:  Marital Status: /Civil Union     Meds/Allergies   I have reviewed home medications with patient family member.  Prior to Admission medications    Medication Sig Start Date End Date Taking? Authorizing Provider   acetaminophen (TYLENOL) 325 mg tablet every 6 (six) hours as needed 10/19/24   Historical Provider, MD   apixaban (ELIQUIS) 5 mg Take 5 mg by mouth 2 (two) times a day 2/13/25 3/15/25  Historical Provider, MD   calcium carbonate (TUMS) 500 mg chewable tablet Chew 1 tablet daily 10/19/24   Historical Provider, MD   cholecalciferol (VITAMIN D3) 1,000 units tablet Take 2 tablets (2,000 Units total) by mouth daily 11/18/24   Devonte Vazquez DO   docusate sodium (COLACE) 100 mg capsule Take 1 capsule (100 mg total) by mouth 2 (two) times a day 12/5/24   Magdalene Silver MD   DULoxetine (CYMBALTA)  20 mg capsule Take 20 mg by mouth daily    Historical Provider, MD   enoxaparin (LOVENOX) 100 mg/mL Inject 0.9 mL (90 mg total) under the skin every 12 (twelve) hours 12/9/24   Henna Rojas MD   famotidine (PEPCID) 20 mg tablet Take 1 tablet (20 mg total) by mouth in the morning 11/18/24   Devonte Vazquez DO   levothyroxine 100 mcg tablet TAKE 1 TABLET BY MOUTH DAILY 4/24/18   Historical Provider, MD   losartan (COZAAR) 25 mg tablet Take 1 tablet (25 mg total) by mouth daily 11/18/24 11/18/25  Devonte Vazquez DO   losartan (COZAAR) 50 mg tablet Take 1 tablet by mouth in the morning 1/10/25   Historical Provider, MD   MAGNESIUM PO Take 1 tablet by mouth daily  Patient not taking: Reported on 12/30/2024    Historical Provider, MD   NIFEdipine ER (ADALAT CC) 30 MG 24 hr tablet Take 30 mg by mouth daily 2/17/25   Historical Provider, MD   predniSONE 1 mg tablet Take 1 tablet by mouth in the morning  Patient not taking: Reported on 3/4/2025 12/9/24   Historical Provider, MD   prochlorperazine (COMPAZINE) 5 mg tablet Take 5 mg by mouth  Patient not taking: Reported on 3/4/2025    Historical Provider, MD   senna (SENOKOT) 8.6 mg Take 8.6 mg by mouth as needed  Patient not taking: Reported on 3/4/2025 10/19/24   Historical Provider, MD   sevelamer carbonate (RENVELA) 800 mg tablet  12/26/24   Historical Provider, MD   tadalafil (CIALIS) 5 MG tablet Take 5 mg by mouth in the morning  Patient not taking: Reported on 3/4/2025 4/2/24 12/30/24  Historical Provider, MD   torsemide (DEMADEX) 20 mg tablet Take 3 tablets (60 mg total) by mouth daily 12/9/24   Henna Rojas MD   traMADol (ULTRAM) 50 mg tablet Take 1 tablet (50 mg total) by mouth every 6 (six) hours as needed for moderate pain 12/9/24   Henna Rojas MD     Allergies   Allergen Reactions    Hydromorphone Hallucinations, Other (See Comments), Delirium, Confusion and Visual Disturbance     hallucinosis    Patient saw things that were not there    Other reaction(s):  Delirium, Hallucinations, Mental Status Changes, Other (see comments), Visual Disturbance   hallucinosis   hallucinosis   hallucinosis   hallucinosis   Patient saw things that were not there    hallucinosis   hallucinosis   Patient saw things that were not there    hallucinosis   hallucinosis   hallucinosis   hallucinosis   Patient saw things that were not there    hallucinosis   hallucinosis   hallucinosis   hallucinosis   hallucinosis   hallucinosis   Patient saw things that were not there    hallucinosis   hallucinosis   Patient saw things that were not there    hallucinosis    Heparin Other (See Comments)     Develops nodule under skin after injection    Medical Tape Rash     Itches    Skin irritate with EKG leads       Objective :  Temp:  [99.3 °F (37.4 °C)] 99.3 °F (37.4 °C)  HR:  [70-86] 70  BP: (128-141)/(61-70) 135/61  Resp:  [13-20] 13  SpO2:  [94 %-97 %] 95 %  O2 Device: None (Room air)    Physical Exam  Constitutional:       General: He is not in acute distress.     Comments: Seems tired   Cardiovascular:      Rate and Rhythm: Normal rate and regular rhythm.      Heart sounds: No murmur heard.  Pulmonary:      Effort: Pulmonary effort is normal. No respiratory distress.      Breath sounds: Normal breath sounds. No wheezing.   Abdominal:      General: Bowel sounds are normal. There is no distension.      Palpations: Abdomen is soft.      Tenderness: There is no abdominal tenderness.   Musculoskeletal:         General: No swelling.   Skin:     General: Skin is warm and dry.   Neurological:      General: No focal deficit present.      Mental Status: He is alert and oriented to person, place, and time.      Cranial Nerves: No cranial nerve deficit.      Motor: No weakness.   Psychiatric:         Mood and Affect: Mood normal.          Lines/Drains:            Lab Results: I have reviewed the following results:  Results from last 7 days   Lab Units 03/15/25  0437   WBC Thousand/uL 25.60*   HEMOGLOBIN g/dL  11.5*   HEMATOCRIT % 33.9*   PLATELETS Thousands/uL 236   SEGS PCT % 86*   LYMPHO PCT % 4*   MONO PCT % 4   EOS PCT % 5     Results from last 7 days   Lab Units 03/15/25  0437   SODIUM mmol/L 137   POTASSIUM mmol/L 3.2*   CHLORIDE mmol/L 101   CO2 mmol/L 29   BUN mg/dL 32*   CREATININE mg/dL 1.76*   ANION GAP mmol/L 7   CALCIUM mg/dL 7.7*   ALBUMIN g/dL 2.4*   TOTAL BILIRUBIN mg/dL 0.60   ALK PHOS U/L 51   ALT U/L 12   AST U/L 16   GLUCOSE RANDOM mg/dL 92     Results from last 7 days   Lab Units 03/15/25  0437   INR  1.13         Lab Results   Component Value Date    HGBA1C 5.4 10/05/2024    HGBA1C 5.1 10/28/2022    HGBA1C 5.0 03/08/2021     Results from last 7 days   Lab Units 03/15/25  0437   PROCALCITONIN ng/ml 0.10       Imaging Results Review: I reviewed radiology reports from this admission including: chest xray and CT head.  Other Study Results Review: EKG was reviewed.     Administrative Statements       ** Please Note: This note has been constructed using a voice recognition system. **

## 2025-03-15 NOTE — ASSESSMENT & PLAN NOTE
[facility] heme-on[facility], [facility], currently in clinical trial  He started his clinical trial 3/6/2025  Received obinutuzumab on 3/7/2025 and 3/8/2025  He was on prednisone 100 mg daily from 3/7/2025 to 3/13/2025  Received a dose of filgrastim 3/14/2025  He is currently on ibrutinib 560 mg daily, taking it around 8 AM every morning, has to take it at the same time every day  On Revlimid 10 mg daily, also taking it at 8 AM every morning, has to take it at the same time every day  Ibrutinib and Revlimid sent to pharmacy to continue medications  On Bactrim Monday Wednesday Friday

## 2025-03-15 NOTE — ASSESSMENT & PLAN NOTE
Patient is a 69-year-old male with a past medical history of mantle cell lymphoma, CKD stage IV, minimal-change disease, nephrotic syndrome in the setting of mantle cell lymphoma, history of PE, hypertension who presented with 2 syncopal episodes yesterday.  Patient started clinical trial for mantle cell lymphoma 3/6/2025, he has been taking high-dose steroids for 7 days, it was making him anxious, he slept about 2 hours daily for the past 7 days.  Yesterday while standing up twice he felt really lightheaded initially at 12 pm, he accidentally urinated himself at that time, the lightheadedness lasted for about 2 to 3 minutes.  Then around 3 PM again while standing up developed severe lightheadedness  and he passed out for a few seconds  Suspect orthostatic hypotension, generalized fatigue from sleep deprivation for the past 7 days, last oral intake.  Denies chest pain palpitation shortness of breath  He has CKD stage IV, creatinine at baseline, potassium 3.2, magnesium 2.0, procalcitonin negative, troponin x 2 negative, COVID/flu/RSV negative, TSH slightly elevated, hemoglobin stable.  No focal neurologic deficit  Found to have prolonged QT at 589  CTA head and neck showed no evidence of acute pathology  Will start gentle hydration with normal saline at 100 cc/h for 1 L  Check orthostatic vitals  Monitor on telemetry

## 2025-03-15 NOTE — ED PROVIDER NOTES
Time reflects when diagnosis was documented in both MDM as applicable and the Disposition within this note       Time User Action Codes Description Comment    3/15/2025  8:50 AM Davi Portillo [R55] Syncope     3/15/2025  8:50 AM Davi Portillo Add [R41.0] Confusion     3/15/2025  8:51 AM Davi Portillo [C83.10] Mantle cell lymphoma (HCC)     3/16/2025 11:41 AM Onel Howard [Z86.711] History of pulmonary embolism           ED Disposition       ED Disposition   Admit    Condition   Stable    Date/Time   Sat Mar 15, 2025  8:50 AM    Comment   Case was discussed with [facility] and the patient's admission status was agreed to be Admission Status: observation status to the service of Dr. Houston .               Assessment & Plan       Medical Decision Making  Ill-appearing pt currently in trial for mantle cell lymphoma tx with syncopal events today, somewhat low PO intake. Note leukocytosis and neutrophil predominance, though note he has been on prednisone. EKG remarkable for QTc 589. Flu, RSV, COVID-negative, chest x-ray unremarkable, UA does not suggest UTI, CT/CTA of brain/head without acute findings. Concern for sepsis is high, obtain blood cultures, sepsis workup, and initiating vancomycin/cefepime. Note Long QT, doubt this is the exclusive cause of symptoms. Also likely dehydrated, creatinine slightly above baseline, low electrolytes. Admit to [facility] for further management and workup.    Amount and/or Complexity of Data Reviewed  Labs: ordered. Decision-making details documented in ED Course.  Radiology: ordered.    Risk  Prescription drug management.  Decision regarding hospitalization.        ED Course as of 03/24/25 1708   Sat Mar 15, 2025   0456 WBC(!): 25.60   0459 Segmented %(!): 86   0459 Immature Grans %: 1   0500 Absolute Neutrophils(!): 22.03  Just finished prednisone   0500 Absolute Eosinophils(!): 1.34   0500 Hemoglobin(!): 11.5   0500 POCT INR: 1.13   0504 Temperature: 99.3 °F (37.4 °C)   0508  Potassium(!): 3.2  Kdur 40mEq ordered   0508 BUN(!): 32   0508 Creatinine(!): 1.76  Recent baseline 1.5   0509 Calcium(!): 7.7   0509 Albumin(!): 2.4   0649 Impression:  CT Brain:  No definite CT evidence of acute intracranial abnormality. An area of encephalomalacia within the inferomedial right frontal lobe appears similar to December 19, 2019. Clinical correlation is necessary. If further evaluation is indicated, MRI  could be performed, if there are no contraindications.  Paranasal sinus disease, as described above. Acute sinusitis should be excluded clinically.  ..   0712 FLU/RSV/COVID - if FLU/RSV clinically relevant (2hr TAT)  All neg   0723 hs TnI 0hr: 22   0726 Delta 2hr hsTnI: 1       Medications   oxyCODONE (ROXICODONE) IR tablet 5 mg (5 mg Oral Given 3/15/25 0530)   vancomycin (VANCOCIN) 2,000 mg in sodium chloride 0.9 % 500 mL IVPB (0 mg Intravenous Stopped 3/15/25 0850)   cefepime (MAXIPIME) 2 g/50 mL dextrose IVPB (0 mg Intravenous Stopped 3/15/25 0605)   potassium chloride (Klor-Con M20) CR tablet 40 mEq (40 mEq Oral Given 3/15/25 0530)   calcium gluconate 2 g in sodium chloride 0.9% 100 mL (premix) (0 g Intravenous Stopped 3/15/25 0633)   iohexol (OMNIPAQUE) 350 MG/ML injection (MULTI-DOSE) 85 mL (85 mL Intravenous Given 3/15/25 0525)       ED Risk Strat Scores                            SBIRT 20yo+      Flowsheet Row Most Recent Value   Initial Alcohol Screen: US AUDIT-C     1. How often do you have a drink containing alcohol? 0 Filed at: 03/15/2025 0406   2. How many drinks containing alcohol do you have on a typical day you are drinking?  0 Filed at: 03/15/2025 0406   3a. Male UNDER 65: How often do you have five or more drinks on one occasion? 0 Filed at: 03/15/2025 0406   3b. FEMALE Any Age, or MALE 65+: How often do you have 4 or more drinks on one occassion? 0 Filed at: 03/15/2025 0406   Audit-C Score 0 Filed at: 03/15/2025 0406   JOSÉ MIGUEL: How many times in the past year have you...    Used an  illegal drug or used a prescription medication for non-medical reasons? Never Filed at: 03/15/2025 0406                            History of Present Illness       Chief Complaint   Patient presents with    Dizziness     Patient reports having increased dizziness yesterday. Had two syncopal episodes yesterday where he was able to lower himself to the ground. During one of the syncopal episodes he lost control of bowels. Wife also states after syncopal episode, had an episode of aphasia. Has lymphoma and currently in a study for treatment. Increased lethargy. Also complaining of headache. Hx of blood clots. Started on some new meds for cancer tx.        Past Medical History:   Diagnosis Date    Acute nephrotic syndrome     Disease of thyroid gland     DVT (deep venous thrombosis) (HCC)     Mantle cell lymphoma (HCC)       Past Surgical History:   Procedure Laterality Date    ABLATION OF DYSRHYTHMIC FOCUS      IR TUNNELED DIALYSIS CATHETER REMOVAL  11/13/2024      History reviewed. No pertinent family history.   Social History     Tobacco Use    Smoking status: Never    Smokeless tobacco: Never   Vaping Use    Vaping status: Never Used   Substance Use Topics    Alcohol use: Yes     Comment: rare    Drug use: Never      E-Cigarette/Vaping    E-Cigarette Use Never User       E-Cigarette/Vaping Substances    Nicotine No     THC No     CBD No     Flavoring No     Other No     Unknown No       I have reviewed and agree with the history as documented.     Ptr is 69M with mantle cell lymphoma, DM, minimal change dz, CKD, DVT/PE here with neck pain and syncope today. 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 Neck stiff, painful to move in any direction, cannot do chin to chest. After 2-3 h became v uncomfortable and restless and woke up wife but was essentially unintelligible and wife reports word salad and sig repetition of sentences/thoughts but no  dysarthria, no stuttering/aphasic speech. Saw white spots/squiggle lines today, lasted at least an hour mostly L eye that persisted despite closing eyes. Had some benadryl to try to help him sleep since he hasn't slept all week 2/2 prednisone No fevers, chills, N/V/D. No SOB or CP. Had first/only dose of neulasta 10pm last night. Is on [facility] trial study --mantle cell lymphoma s/p rituximab until Dec 2024. Now day 7 has gotten ibrutinib and lenalidomide w prednisone (last pred 2 days ago); had initial 2 doses of obinutuzumab       Dizziness  Associated symptoms: headaches    Associated symptoms: no chest pain, no diarrhea, no nausea, no palpitations and no vomiting        Review of Systems   Constitutional:  Positive for activity change and fatigue.   HENT:  Negative for congestion.    Eyes:  Positive for visual disturbance.   Respiratory: Negative.     Cardiovascular:  Positive for leg swelling. Negative for chest pain and palpitations.   Gastrointestinal:  Negative for constipation, diarrhea, nausea and vomiting.   Genitourinary: Negative.    Musculoskeletal:  Positive for myalgias, neck pain and neck stiffness.   Skin: Negative.    Neurological:  Positive for dizziness, syncope, light-headedness and headaches.   Psychiatric/Behavioral:  Positive for sleep disturbance.            Objective       ED Triage Vitals [03/15/25 0404]   Temperature Pulse Blood Pressure Respirations SpO2 Patient Position - Orthostatic VS   99.3 °F (37.4 °C) 86 139/70 20 97 % Sitting      Temp Source Heart Rate Source BP Location FiO2 (%) Pain Score    Temporal Monitor Left arm -- 8      Vitals      Date and Time Temp Pulse SpO2 Resp BP Pain Score FACES Pain Rating User   03/16/25 1050 98.5 °F (36.9 °C) 62 95 % -- 140/78 -- -- DII   03/16/25 0920 -- 64 95 % -- 161/79 -- -- DII   03/16/25 0723 98.8 °F (37.1 °C) 60 96 % -- 145/70 -- -- DII   03/16/25 0239 98.8 °F (37.1 °C) 63 96 % 18 141/77 -- -- DII   03/15/25 2224 99.7 °F (37.6 °C) 66 95 % 16  155/75 -- -- DII   03/15/25 1930 -- -- 97 % -- -- No Pain -- AS   03/15/25 1914 100.2 °F (37.9 °C) 73 96 % 15 123/67 -- -- DII   03/15/25 1638 -- -- -- -- -- 8 -- CF   03/15/25 1534 99.3 °F (37.4 °C) -- -- -- -- 7 -- CF   03/15/25 1534 -- 68 94 % -- 148/75 -- -- DII   03/15/25 1300 -- 70 97 % 16 145/71 -- -- BL   03/15/25 1200 -- 68 97 % 16 146/66 -- -- BL   03/15/25 1100 -- 70 95 % 13 135/61 -- -- BL   03/15/25 1000 -- 70 95 % 13 134/66 -- -- BL   03/15/25 0800 -- 76 94 % 13 128/61 -- -- BL   03/15/25 0700 -- 80 96 % 16 132/65 -- -- BL   03/15/25 0600 -- 78 94 % 18 141/65 -- -- RAHUL   03/15/25 0404 99.3 °F (37.4 °C) 86 97 % 20 139/70 8 -- OO            Physical Exam  Constitutional:       Appearance: He is ill-appearing and diaphoretic.   HENT:      Head: Normocephalic and atraumatic.      Right Ear: Tympanic membrane, ear canal and external ear normal.      Left Ear: Tympanic membrane, ear canal and external ear normal.      Mouth/Throat:      Mouth: Mucous membranes are dry.      Pharynx: No oropharyngeal exudate or posterior oropharyngeal erythema.   Eyes:      General: No scleral icterus.        Right eye: No discharge.         Left eye: No discharge.      Extraocular Movements: Extraocular movements intact.      Conjunctiva/sclera: Conjunctivae normal.      Pupils: Pupils are equal, round, and reactive to light.   Neck:      Comments: Stiff neck (not overtly rigid)  TTP BL and posterior  Pain w all ROM  Cannot touch chin to chest  Cardiovascular:      Rate and Rhythm: Normal rate and regular rhythm.      Pulses: Normal pulses.      Heart sounds: Murmur heard.   Pulmonary:      Effort: Pulmonary effort is normal.      Breath sounds: Normal breath sounds. No stridor. No rhonchi.   Abdominal:      General: There is no distension.      Palpations: Abdomen is soft.      Tenderness: There is no abdominal tenderness. There is no guarding.   Musculoskeletal:      Cervical back: Tenderness present.      Right lower leg:  Edema present.      Left lower leg: Edema present.   Lymphadenopathy:      Cervical: Cervical adenopathy present.   Skin:     General: Skin is warm.      Capillary Refill: Capillary refill takes less than 2 seconds.   Neurological:      General: No focal deficit present.      Mental Status: He is oriented to person, place, and time.      Cranial Nerves: No cranial nerve deficit.      Sensory: No sensory deficit.   Psychiatric:         Mood and Affect: Mood normal.         Behavior: Behavior normal.         Results Reviewed       Procedure Component Value Units Date/Time    Blood culture #1 [[id number]] Collected: 03/15/25 0437    Lab Status: Final result Specimen: Blood from Arm, Right Updated: 03/20/25 1401     Blood Culture No Growth After 5 Days.    Blood culture #2 [[id number]]  (Abnormal)  (Susceptibility) Collected: 03/15/25 0452    Lab Status: Final result Specimen: Blood from Arm, Left Updated: 03/18/25 1403     Blood Culture Micrococcus luteus    Narrative:      Susceptibility testing will not be performed as this organism, when isolated from a single set of blood cultures, represents probable skin arcenio contamination. Please call the Microbiology Laboratory within 5 days at [phone] if further workup is required.      Susceptibility       Micrococcus luteus (1)       Antibiotic Interpretation Microscan Method Status    ZID Performed  Yes MALCOLM Final                       Basic metabolic panel [[id number]]  (Abnormal) Collected: 03/16/25 0532    Lab Status: Final result Specimen: Blood from Arm, Right Updated: 03/16/25 0706     Sodium 136 mmol/L      Potassium 3.6 mmol/L      Chloride 107 mmol/L      CO2 25 mmol/L      ANION GAP 4 mmol/L      BUN 16 mg/dL      Creatinine 1.09 mg/dL      Glucose 70 mg/dL      Glucose, Fasting 70 mg/dL      Calcium 7.6 mg/dL      eGFR 69 ml/min/1.73sq m     Narrative:      National Kidney Disease Foundation guidelines for Chronic Kidney Disease (CKD):     Stage 1 with normal  "or high GFR (GFR > 90 mL/min/1.73 square meters)    Stage 2 Mild CKD (GFR = 60-89 mL/min/1.73 square meters)    Stage 3A Moderate CKD (GFR = 45-59 mL/min/1.73 square meters)    Stage 3B Moderate CKD (GFR = 30-44 mL/min/1.73 square meters)    Stage 4 Severe CKD (GFR = 15-29 mL/min/1.73 square meters)    Stage 5 End Stage CKD (GFR <15 mL/min/1.73 square meters)  Note: GFR calculation is accurate only with a steady state creatinine    Magnesium [[id number]]  (Normal) Collected: 03/16/25 0532    Lab Status: Final result Specimen: Blood from Arm, Right Updated: 03/16/25 0739     Magnesium 1.9 mg/dL     T4, free [[id number]]  (Normal) Collected: 03/16/25 0532    Lab Status: Final result Specimen: Blood from Arm, Right Updated: 03/16/25 0729     Free T4 0.88 ng/dL     Narrative:        \"Therapeutic range for patients medicated with thyroid disorders: 0.61-1.24 ng/dL.\"    CBC (With Platelets) [[id number]]  (Abnormal) Collected: 03/16/25 0532    Lab Status: Final result Specimen: Blood from Arm, Right Updated: 03/16/25 0657     WBC 50.04 Thousand/uL      RBC 3.22 Million/uL      Hemoglobin 10.5 g/dL      Hematocrit 32.0 %      MCV 99 fL      MCH 32.6 pg      MCHC 32.8 g/dL      RDW 12.0 %      Platelets 188 Thousands/uL      MPV 10.8 fL     C-reactive protein [[id number]]  (Normal) Collected: 03/15/25 0437    Lab Status: Final result Specimen: Blood from Arm, Right Updated: 03/15/25 1244     CRP 2.6 mg/L     HS Troponin I 4hr [[id number]]  (Normal) Collected: 03/15/25 1014    Lab Status: Final result Specimen: Blood from Arm, Right Updated: 03/15/25 1112     hs TnI 4hr 24 ng/L      Delta 4hr hsTnI 2 ng/L     Urine Microscopic [[id number]]  (Abnormal) Collected: 03/15/25 1040    Lab Status: Final result Specimen: Urine, Clean Catch Updated: 03/15/25 1108     RBC, UA Innumerable /hpf      WBC, UA None Seen /hpf      Epithelial Cells Occasional /hpf      Bacteria, UA None Seen /hpf     UA w Reflex to Microscopic w Reflex to Culture " [[id number]]  (Abnormal) Collected: 03/15/25 1040    Lab Status: Final result Specimen: Urine, Clean Catch Updated: 03/15/25 1059     Color, UA Light Yellow     Clarity, UA Clear     Specific Gravity, UA 1.032     pH, UA 8.0     Leukocytes, UA Negative     Nitrite, UA Negative     Protein, UA >=600 (4+) mg/dl      Glucose, UA Trace mg/dl      Ketones, UA Negative mg/dl      Urobilinogen, UA <2.0 mg/dl      Bilirubin, UA Negative     Occult Blood, UA Moderate    TSH [[id number]]  (Abnormal) Collected: 03/15/25 0437    Lab Status: Final result Specimen: Blood from Arm, Right Updated: 03/15/25 0851     TSH 3RD GENERATON 9.599 uIU/mL     Magnesium [[id number]]  (Normal) Collected: 03/15/25 0437    Lab Status: Final result Specimen: Blood from Arm, Right Updated: 03/15/25 0850     Magnesium 2.0 mg/dL     Procalcitonin [[id number]]  (Normal) Collected: 03/15/25 0437    Lab Status: Final result Specimen: Blood from Arm, Right Updated: 03/15/25 0814     Procalcitonin 0.10 ng/ml     HS Troponin I 2hr [[id number]]  (Normal) Collected: 03/15/25 0631    Lab Status: Final result Specimen: Blood from Arm, Left Updated: 03/15/25 0715     hs TnI 2hr 23 ng/L      Delta 2hr hsTnI 1 ng/L     FLU/RSV/COVID - if FLU/RSV clinically relevant (2hr TAT) [[id number]]  (Normal) Collected: 03/15/25 0456    Lab Status: Final result Specimen: Nares from Nose Updated: 03/15/25 0545     SARS-CoV-2 Negative     INFLUENZA A PCR Negative     INFLUENZA B PCR Negative     RSV PCR Negative    Narrative:      This test has been performed using the CoV-2/Flu/RSV plus assay on the [employer] GeneXpert platform. This test has been validated by the  and verified by the performing laboratory.     This test is designed to amplify and detect the following: nucleocapsid (N), envelope (E), and RNA-dependent RNA polymerase (RdRP) genes of the SARS-CoV-2 genome; matrix (M), basic polymerase (PB2), and acidic protein (PA) segments of the influenza A genome;  matrix (M) and non-structural protein (NS) segments of the influenza B genome, and the nucleocapsid genes of RSV A and RSV B.     Positive results are indicative of the presence of Flu A, Flu B, RSV, and/or SARS-CoV-2 RNA. Positive results for SARS-CoV-2 or suspected novel influenza should be reported to state, local, or federal health departments according to local reporting requirements.      All results should be assessed in conjunction with clinical presentation and other laboratory markers for clinical management.     FOR PEDIATRIC PATIENTS - copy/paste COVID Guidelines URL to browser: [email or web address]       B-Type Natriuretic Peptide(BNP) [[id number]]  (Normal) Collected: 03/15/25 0437    Lab Status: Final result Specimen: Blood from Arm, Right Updated: 03/15/25 0513     BNP 21 pg/mL     Comprehensive metabolic panel [[id number]]  (Abnormal) Collected: 03/15/25 0437    Lab Status: Final result Specimen: Blood from Arm, Right Updated: 03/15/25 0508     Sodium 137 mmol/L      Potassium 3.2 mmol/L      Chloride 101 mmol/L      CO2 29 mmol/L      ANION GAP 7 mmol/L      BUN 32 mg/dL      Creatinine 1.76 mg/dL      Glucose 92 mg/dL      Calcium 7.7 mg/dL      Corrected Calcium 9.0 mg/dL      AST 16 U/L      ALT 12 U/L      Alkaline Phosphatase 51 U/L      Total Protein 4.6 g/dL      Albumin 2.4 g/dL      Total Bilirubin 0.60 mg/dL      eGFR 38 ml/min/1.73sq m     Narrative:      National Kidney Disease Foundation guidelines for Chronic Kidney Disease (CKD):     Stage 1 with normal or high GFR (GFR > 90 mL/min/1.73 square meters)    Stage 2 Mild CKD (GFR = 60-89 mL/min/1.73 square meters)    Stage 3A Moderate CKD (GFR = 45-59 mL/min/1.73 square meters)    Stage 3B Moderate CKD (GFR = 30-44 mL/min/1.73 square meters)    Stage 4 Severe CKD (GFR = 15-29 mL/min/1.73 square meters)    Stage 5 End Stage CKD (GFR <15 mL/min/1.73 square meters)  Note: GFR calculation  is accurate only with a steady state creatinine    HS Troponin 0hr (reflex protocol) [[id number]]  (Normal) Collected: 03/15/25 0437    Lab Status: Final result Specimen: Blood from Arm, Right Updated: 03/15/25 0505     hs TnI 0hr 22 ng/L     Protime-INR [[id number]]  (Normal) Collected: 03/15/25 0437    Lab Status: Final result Specimen: Blood from Arm, Right Updated: 03/15/25 0456     Protime 14.8 seconds      INR 1.13    Narrative:      INR Therapeutic Range    Indication                                             INR Range      Atrial Fibrillation                                               2.0-3.0  Hypercoagulable State                                    2.0.2.3  Left Ventricular Asist Device                            2.0-3.0  Mechanical Heart Valve                                  -    Aortic(with afib, MI, embolism, HF, LA enlargement,    and/or coagulopathy)                                     2.0-3.0 (2.5-3.5)     Mitral                                                             2.5-3.5  Prosthetic/Bioprosthetic Heart Valve               2.0-3.0  Venous thromboembolism (VTE: VT, PE        2.0-3.0    CBC and differential [[id number]]  (Abnormal) Collected: 03/15/25 0437    Lab Status: Final result Specimen: Blood from Arm, Right Updated: 03/15/25 0447     WBC 25.60 Thousand/uL      RBC 3.55 Million/uL      Hemoglobin 11.5 g/dL      Hematocrit 33.9 %      MCV 96 fL      MCH 32.4 pg      MCHC 33.9 g/dL      RDW 11.9 %      MPV 10.6 fL      Platelets 236 Thousands/uL      nRBC 0 /100 WBCs      Segmented % 86 %      Immature Grans % 1 %      Lymphocytes % 4 %      Monocytes % 4 %      Eosinophils Relative 5 %      Basophils Relative 0 %      Absolute Neutrophils 22.03 Thousands/µL      Absolute Immature Grans 0.26 Thousand/uL      Absolute Lymphocytes 0.99 Thousands/µL      Absolute Monocytes 0.89 Thousand/µL      Eosinophils Absolute 1.34 Thousand/µL      Basophils Absolute 0.09 Thousands/µL             XR  chest pa & lateral   Final Interpretation by Sangeeta Garcia MD (03/15 2745)      No acute cardiopulmonary disease.            Workstation performed: [id number]         CTA head and neck with and without contrast   Final Interpretation by Ailyn Cunningham MD (03/15 8388)      CT Brain:  No definite CT evidence of acute intracranial abnormality. An area of encephalomalacia within the inferomedial right frontal lobe appears similar to December 19, 2019. Clinical correlation is necessary. If further evaluation is indicated, MRI    could be performed, if there are no contraindications.      Paranasal sinus disease, as described above. Acute sinusitis should be excluded clinically.      CT Angiography: No evidence of large vessel occlusion or hemodynamically significant stenosis. No evidence of acute arterial dissection.      There is a 1 cm enhancing nodule within the right parotid gland. Follow-up recommended.      The study was marked in EPIC for immediate notification.                  Workstation performed: [id number]             Procedures    ED Medication and Procedure Management   Prior to Admission Medications   Prescriptions Last Dose Informant Patient Reported? Taking?   DULoxetine (CYMBALTA) 20 mg capsule 3/14/2025 Self Yes Yes   Sig: Take 20 mg by mouth daily   MAGNESIUM PO Not Taking Self Yes No   Sig: Take 1 tablet by mouth daily   Patient not taking: Reported on 12/30/2024   NIFEdipine ER (ADALAT CC) 30 MG 24 hr tablet 3/14/2025 at  9:00 PM  Yes Yes   Sig: Take 30 mg by mouth daily   acetaminophen (TYLENOL) 325 mg tablet 3/14/2025 at  7:00 PM  Yes Yes   Sig: every 6 (six) hours as needed   apixaban (ELIQUIS) 5 mg 3/15/2025 at 12:00 PM  Yes Yes   Sig: Take 5 mg by mouth 2 (two) times a day   calcium carbonate (TUMS) 500 mg chewable tablet 3/15/2025  Yes Yes   Sig: Chew 1 tablet daily   cholecalciferol (VITAMIN D3) 1,000 units tablet 3/14/2025  No Yes   Sig: Take 2 tablets (2,000 Units total) by  mouth daily   docusate sodium (COLACE) 100 mg capsule 3/15/2025  No Yes   Sig: Take 1 capsule (100 mg total) by mouth 2 (two) times a day   enoxaparin (LOVENOX) 100 mg/mL Not Taking  No No   Sig: Inject 0.9 mL (90 mg total) under the skin every 12 (twelve) hours   Patient not taking: Reported on 3/15/2025   famotidine (PEPCID) 20 mg tablet Not Taking  No No   Sig: Take 1 tablet (20 mg total) by mouth in the morning   Patient not taking: Reported on 3/15/2025   levothyroxine 100 mcg tablet 3/14/2025 Self Yes Yes   Sig: TAKE 1 TABLET BY MOUTH DAILY   losartan (COZAAR) 25 mg tablet Not Taking  No No   Sig: Take 1 tablet (25 mg total) by mouth daily   Patient not taking: Reported on 3/15/2025   losartan (COZAAR) 50 mg tablet 3/14/2025  Yes Yes   Sig: Take 1 tablet by mouth in the morning   predniSONE 1 mg tablet Not Taking  Yes No   Sig: Take 1 tablet by mouth in the morning   Patient not taking: Reported on 3/15/2025   prochlorperazine (COMPAZINE) 5 mg tablet Past Month  Yes Yes   Sig: Take 5 mg by mouth   senna (SENOKOT) 8.6 mg Not Taking  Yes No   Sig: Take 8.6 mg by mouth as needed   Patient not taking: Reported on 12/30/2024   sevelamer carbonate (RENVELA) 800 mg tablet Not Taking  Yes No   Patient not taking: Reported on 3/15/2025   tadalafil (CIALIS) 5 MG tablet  Self Yes No   Sig: Take 5 mg by mouth in the morning   Patient not taking: Reported on 3/4/2025   torsemide (DEMADEX) 20 mg tablet Past Week  No Yes   Sig: Take 3 tablets (60 mg total) by mouth daily   traMADol (ULTRAM) 50 mg tablet Past Month  No Yes   Sig: Take 1 tablet (50 mg total) by mouth every 6 (six) hours as needed for moderate pain      Facility-Administered Medications: None     Discharge Medication List as of 3/16/2025 12:19 PM        START taking these medications    Details   allopurinol (ZYLOPRIM) 300 mg tablet Take 1 tablet (300 mg total) by mouth daily, Starting Mon 3/17/2025, No Print      Ibrutinib 140 MG CAPS Take 4 capsules (560 mg  total) by mouth in the morning Do not start before March 17, 2025., Starting Mon 3/17/2025, No Print      lenalidomide (REVLIMID) 10 MG CAPS Take 1 capsule (10 mg total) by mouth in the morning, Starting Mon 3/17/2025, No Print           CONTINUE these medications which have CHANGED    Details   apixaban (ELIQUIS) 5 mg Take 1 tablet (5 mg total) by mouth 2 (two) times a day, Starting Sun 3/16/2025, Until Tue 4/15/2025, Normal           CONTINUE these medications which have NOT CHANGED    Details   acetaminophen (TYLENOL) 325 mg tablet every 6 (six) hours as needed, Starting Sat 10/19/2024, Historical Med      calcium carbonate (TUMS) 500 mg chewable tablet Chew 1 tablet daily, Starting Sat 10/19/2024, Historical Med      cholecalciferol (VITAMIN D3) 1,000 units tablet Take 2 tablets (2,000 Units total) by mouth daily, Starting Mon 11/18/2024, Normal      docusate sodium (COLACE) 100 mg capsule Take 1 capsule (100 mg total) by mouth 2 (two) times a day, Starting u 12/5/2024, Normal      DULoxetine (CYMBALTA) 20 mg capsule Take 20 mg by mouth daily, Historical Med      levothyroxine 100 mcg tablet TAKE 1 TABLET BY MOUTH DAILY, Historical Med      losartan (COZAAR) 50 mg tablet Take 1 tablet by mouth in the morning, Starting Fri 1/10/2025, Historical Med      NIFEdipine ER (ADALAT CC) 30 MG 24 hr tablet Take 30 mg by mouth daily, Starting Mon 2/17/2025, Historical Med      prochlorperazine (COMPAZINE) 5 mg tablet Take 5 mg by mouth, Historical Med      torsemide (DEMADEX) 20 mg tablet Take 3 tablets (60 mg total) by mouth daily, Starting Mon 12/9/2024, NormalPaused since today until manually resumed      traMADol (ULTRAM) 50 mg tablet Take 1 tablet (50 mg total) by mouth every 6 (six) hours as needed for moderate pain, Starting Mon 12/9/2024, Normal           STOP taking these medications       enoxaparin (LOVENOX) 100 mg/mL Comments:   Reason for Stopping:         famotidine (PEPCID) 20 mg tablet Comments:   Reason  for Stopping:         MAGNESIUM PO Comments:   Reason for Stopping:         predniSONE 1 mg tablet Comments:   Reason for Stopping:         senna (SENOKOT) 8.6 mg Comments:   Reason for Stopping:         sevelamer carbonate (RENVELA) 800 mg tablet Comments:   Reason for Stopping:         tadalafil (CIALIS) 5 MG tablet Comments:   Reason for Stopping:             Outpatient Discharge Orders   Discharge Diet     ED SEPSIS DOCUMENTATION   Time reflects when diagnosis was documented in both MDM as applicable and the Disposition within this note       Time User Action Codes Description Comment    3/15/2025  8:50 AM Davi Portillo [R55] Syncope     3/15/2025  8:50 AM Davi Portillo [R41.0] Confusion     3/15/2025  8:51 AM Davi Portillo [C83.10] Mantle cell lymphoma (HCC)     3/16/2025 11:41 AM Onel Howard [Z86.711] History of pulmonary embolism                  Shanique Cutler MD  03/24/25 7037

## 2025-03-15 NOTE — ASSESSMENT & PLAN NOTE
WBC 25 suspect secondary to filgrastim which he received 3/14/2025  He was also on prednisone 100 mg daily from 3/7/2025 to 3/13/2025  He is afebrile, procalcitonin negative  He denies any flulike symptoms recently, abdominal pain diarrhea or dysuria, no open wounds  Received cefepime and vancomycin, hold off further antibiotics and monitor  Follow-up blood cultures  Repeat labs tomorrow

## 2025-03-15 NOTE — PLAN OF CARE
Problem: PAIN - ADULT  Goal: Verbalizes/displays adequate comfort level or baseline comfort level  Description: Interventions:  - Encourage patient to monitor pain and request assistance  - Assess pain using appropriate pain scale  - Administer analgesics based on type and severity of pain and evaluate response  - Implement non-pharmacological measures as appropriate and evaluate response  - Consider cultural and social influences on pain and pain management  - Notify physician/advanced practitioner if interventions unsuccessful or patient reports new pain  Outcome: Progressing     Problem: INFECTION - ADULT  Goal: Absence or prevention of progression during hospitalization  Description: INTERVENTIONS:  - Assess and monitor for signs and symptoms of infection  - Monitor lab/diagnostic results  - Monitor all insertion sites, i.e. indwelling lines, tubes, and drains  - Monitor endotracheal if appropriate and nasal secretions for changes in amount and color  - [place] appropriate cooling/warming therapies per order  - Administer medications as ordered  - Instruct and encourage patient and family to use good hand hygiene technique  - Identify and instruct in appropriate isolation precautions for identified infection/condition  Outcome: Progressing  Goal: Absence of fever/infection during neutropenic period  Description: INTERVENTIONS:  - Monitor WBC    Outcome: Progressing     Problem: SAFETY ADULT  Goal: Patient will remain free of falls  Description: INTERVENTIONS:  - Educate patient/family on patient safety including physical limitations  - Instruct patient to call for assistance with activity   - Consult OT/PT to assist with strengthening/mobility   - Keep Call bell within reach  - Keep bed low and locked with side rails adjusted as appropriate  - Keep care items and personal belongings within reach  - Initiate and maintain comfort rounds  - Make Fall Risk Sign visible to staff    - Apply yellow socks and  bracelet for high fall risk patients  - Consider moving patient to room near nurses station  Outcome: Progressing  Goal: Maintain or return to baseline ADL function  Description: INTERVENTIONS:  -  Assess patient's ability to carry out ADLs; assess patient's baseline for ADL function and identify physical deficits which impact ability to perform ADLs (bathing, care of mouth/teeth, toileting, grooming, dressing, etc.)  - Assess/evaluate cause of self-care deficits   - Assess range of motion  - Assess patient's mobility; develop plan if impaired  - Assess patient's need for assistive devices and provide as appropriate  - Encourage maximum independence but intervene and supervise when necessary  - Involve family in performance of ADLs  - Assess for home care needs following discharge   - Consider OT consult to assist with ADL evaluation and planning for discharge  - Provide patient education as appropriate  Outcome: Progressing  Goal: Maintains/Returns to pre admission functional level  Description: INTERVENTIONS:  - Perform AM-PAC 6 Click Basic Mobility/ Daily Activity assessment daily.  - Set and communicate daily mobility goal to care team and patient/family/caregiver.   - Collaborate with rehabilitation services on mobility goals if   - Out of bed for toileting  - Record patient progress and toleration of activity level   Outcome: Progressing     Problem: DISCHARGE PLANNING  Goal: Discharge to home or other facility with appropriate resources  Description: INTERVENTIONS:  - Identify barriers to discharge w/patient and caregiver  - Arrange for needed discharge resources and transportation as appropriate  - Identify discharge learning needs (meds, wound care, etc.)  - Arrange for interpretive services to assist at discharge as needed  - Refer to Case Management Department for coordinating discharge planning if the patient needs post-hospital services based on physician/advanced practitioner order or complex needs  related to functional status, cognitive ability, or social support system  Outcome: Progressing     Problem: Knowledge Deficit  Goal: Patient/family/caregiver demonstrates understanding of disease process, treatment plan, medications, and discharge instructions  Description: Complete learning assessment and assess knowledge base.  Interventions:  - Provide teaching at level of understanding  - Provide teaching via preferred learning methods  Outcome: Progressing

## 2025-03-15 NOTE — ASSESSMENT & PLAN NOTE
EKG with nonspecific ST-T changes and   Magnesium 2.0  Received a dose of calcium  Monitor on telemetry  Avoid QT prolonging agents

## 2025-03-15 NOTE — ASSESSMENT & PLAN NOTE
History of minimal-change disease, nephrotic syndrome thought to be secondary to mantle cell, he was on Rituxan and prednisone through [facility]  Kidney function stable

## 2025-03-15 NOTE — ASSESSMENT & PLAN NOTE
Home regimen is levothyroxine 100 mcg daily  TSH 9.59  Check free T4  Recheck thyroid panel in 4 to 6 weeks as outpatient

## 2025-03-15 NOTE — ASSESSMENT & PLAN NOTE
Lab Results   Component Value Date    EGFR 38 03/15/2025    EGFR 50 (L) 02/10/2025    EGFR 47 01/03/2025    CREATININE 1.76 (H) 03/15/2025    CREATININE 1.52 (H) 02/10/2025    CREATININE 1.50 (H) 01/03/2025   Creatinine currently at baseline  Continue to monitor

## 2025-03-15 NOTE — ASSESSMENT & PLAN NOTE
Hemoglobin is slightly above his baseline, per wife he has been having labs through clinical trials and it has been increasing lately

## 2025-03-16 VITALS
HEART RATE: 62 BPM | DIASTOLIC BLOOD PRESSURE: 78 MMHG | RESPIRATION RATE: 18 BRPM | SYSTOLIC BLOOD PRESSURE: 140 MMHG | HEIGHT: 72 IN | OXYGEN SATURATION: 95 % | WEIGHT: 197.09 LBS | TEMPERATURE: 98.5 F | BODY MASS INDEX: 26.7 KG/M2

## 2025-03-16 PROBLEM — K11.8 PAROTID NODULE: Status: ACTIVE | Noted: 2025-03-16

## 2025-03-16 LAB
ANION GAP SERPL CALCULATED.3IONS-SCNC: 4 MMOL/L (ref 4–13)
ANISOCYTOSIS BLD QL SMEAR: PRESENT
BUN SERPL-MCNC: 16 MG/DL (ref 5–25)
CALCIUM SERPL-MCNC: 7.6 MG/DL (ref 8.4–10.2)
CHLORIDE SERPL-SCNC: 107 MMOL/L (ref 96–108)
CO2 SERPL-SCNC: 25 MMOL/L (ref 21–32)
CREAT SERPL-MCNC: 1.09 MG/DL (ref 0.6–1.3)
EOSINOPHIL # BLD AUTO: 0.53 THOUSAND/UL (ref 0–0.61)
EOSINOPHIL NFR BLD MANUAL: 1 % (ref 0–6)
ERYTHROCYTE [DISTWIDTH] IN BLOOD BY AUTOMATED COUNT: 12 % (ref 11.6–15.1)
GFR SERPL CREATININE-BSD FRML MDRD: 69 ML/MIN/1.73SQ M
GLUCOSE P FAST SERPL-MCNC: 70 MG/DL (ref 65–99)
GLUCOSE SERPL-MCNC: 70 MG/DL (ref 65–140)
HCT VFR BLD AUTO: 32 % (ref 36.5–49.3)
HGB BLD-MCNC: 10.5 G/DL (ref 12–17)
MACROCYTES BLD QL AUTO: PRESENT
MAGNESIUM SERPL-MCNC: 1.9 MG/DL (ref 1.9–2.7)
MCH RBC QN AUTO: 32.6 PG (ref 26.8–34.3)
MCHC RBC AUTO-ENTMCNC: 32.8 G/DL (ref 31.4–37.4)
MCV RBC AUTO: 99 FL (ref 82–98)
METAMYELOCYTES # BLD MANUAL: 0.53 THOUSAND/UL (ref 0–0.1)
METAMYELOCYTES NFR BLD MANUAL: 1 % (ref 0–1)
MONOCYTES # BLD AUTO: 1.59 THOUSAND/UL (ref 0–1.22)
MONOCYTES NFR BLD AUTO: 3 % (ref 4–12)
NEUTS BAND NFR BLD MANUAL: 26 % (ref 0–8)
NEUTS SEG # BLD: 50.43 THOUSAND/UL (ref 1.81–6.82)
NEUTS SEG NFR BLD AUTO: 69 %
PLATELET # BLD AUTO: 188 THOUSANDS/UL (ref 149–390)
PLATELET BLD QL SMEAR: ADEQUATE
PMV BLD AUTO: 10.8 FL (ref 8.9–12.7)
POTASSIUM SERPL-SCNC: 3.6 MMOL/L (ref 3.5–5.3)
RBC # BLD AUTO: 3.22 MILLION/UL (ref 3.88–5.62)
RBC MORPH BLD: PRESENT
SODIUM SERPL-SCNC: 136 MMOL/L (ref 135–147)
T4 FREE SERPL-MCNC: 0.88 NG/DL (ref 0.61–1.12)
TOTAL CELLS COUNTED SPEC: 100
WBC # BLD AUTO: 50.04 THOUSAND/UL (ref 4.31–10.16)

## 2025-03-16 PROCEDURE — 85027 COMPLETE CBC AUTOMATED: CPT | Performed by: INTERNAL MEDICINE

## 2025-03-16 PROCEDURE — 85007 BL SMEAR W/DIFF WBC COUNT: CPT | Performed by: INTERNAL MEDICINE

## 2025-03-16 PROCEDURE — 93005 ELECTROCARDIOGRAM TRACING: CPT

## 2025-03-16 PROCEDURE — 99239 HOSP IP/OBS DSCHRG MGMT >30: CPT | Performed by: INTERNAL MEDICINE

## 2025-03-16 PROCEDURE — 83735 ASSAY OF MAGNESIUM: CPT | Performed by: INTERNAL MEDICINE

## 2025-03-16 PROCEDURE — 80048 BASIC METABOLIC PNL TOTAL CA: CPT | Performed by: INTERNAL MEDICINE

## 2025-03-16 PROCEDURE — 84439 ASSAY OF FREE THYROXINE: CPT | Performed by: INTERNAL MEDICINE

## 2025-03-16 RX ORDER — LENALIDOMIDE 10 MG/1
10 CAPSULE ORAL DAILY
Start: 2025-03-17

## 2025-03-16 RX ORDER — ALLOPURINOL 300 MG/1
300 TABLET ORAL DAILY
Start: 2025-03-17

## 2025-03-16 RX ORDER — NIFEDIPINE 30 MG/1
30 TABLET, EXTENDED RELEASE ORAL
Status: DISCONTINUED | OUTPATIENT
Start: 2025-03-16 | End: 2025-03-16 | Stop reason: HOSPADM

## 2025-03-16 RX ADMIN — LOSARTAN POTASSIUM 50 MG: 50 TABLET, FILM COATED ORAL at 09:20

## 2025-03-16 RX ADMIN — IBRUTINIB 4 TABLET: 140 TABLET, FILM COATED ORAL at 09:20

## 2025-03-16 RX ADMIN — CALCIUM CARBONATE (ANTACID) CHEW TAB 500 MG 500 MG: 500 CHEW TAB at 09:19

## 2025-03-16 RX ADMIN — DOCUSATE SODIUM 100 MG: 100 CAPSULE, LIQUID FILLED ORAL at 09:19

## 2025-03-16 RX ADMIN — ALLOPURINOL 300 MG: 300 TABLET ORAL at 09:19

## 2025-03-16 RX ADMIN — DULOXETINE HYDROCHLORIDE 20 MG: 20 CAPSULE, DELAYED RELEASE PELLETS ORAL at 09:19

## 2025-03-16 RX ADMIN — Medication 2000 UNITS: at 09:18

## 2025-03-16 RX ADMIN — APIXABAN 5 MG: 5 TABLET, FILM COATED ORAL at 09:19

## 2025-03-16 RX ADMIN — LENALIDOMIDE 10 MG: 5 CAPSULE ORAL at 09:21

## 2025-03-16 RX ADMIN — LEVOTHYROXINE SODIUM 100 MCG: 100 TABLET ORAL at 06:44

## 2025-03-16 NOTE — PLAN OF CARE
Problem: PAIN - ADULT  Goal: Verbalizes/displays adequate comfort level or baseline comfort level  Description: Interventions:  - Encourage patient to monitor pain and request assistance  - Assess pain using appropriate pain scale  - Administer analgesics based on type and severity of pain and evaluate response  - Implement non-pharmacological measures as appropriate and evaluate response  - Consider cultural and social influences on pain and pain management  - Notify physician/advanced practitioner if interventions unsuccessful or patient reports new pain  Outcome: Progressing     Problem: INFECTION - ADULT  Goal: Absence or prevention of progression during hospitalization  Description: INTERVENTIONS:  - Assess and monitor for signs and symptoms of infection  - Monitor lab/diagnostic results  - Monitor all insertion sites, i.e. indwelling lines, tubes, and drains  - Monitor endotracheal if appropriate and nasal secretions for changes in amount and color  - [place] appropriate cooling/warming therapies per order  - Administer medications as ordered  - Instruct and encourage patient and family to use good hand hygiene technique  - Identify and instruct in appropriate isolation precautions for identified infection/condition  Outcome: Progressing  Goal: Absence of fever/infection during neutropenic period  Description: INTERVENTIONS:  - Monitor WBC    Outcome: Progressing     Problem: SAFETY ADULT  Goal: Patient will remain free of falls  Description: INTERVENTIONS:  - Educate patient/family on patient safety including physical limitations  - Instruct patient to call for assistance with activity   - Consult OT/PT to assist with strengthening/mobility   - Keep Call bell within reach  - Keep bed low and locked with side rails adjusted as appropriate  - Keep care items and personal belongings within reach  - Initiate and maintain comfort rounds  - Make Fall Risk Sign visible to staff  - Offer Toileting every 2 Hours,  in advance of need  - Initiate/Maintain bed alarm  - Obtain necessary fall risk management equipment: alarm  - Apply yellow socks and bracelet for high fall risk patients  - Consider moving patient to room near nurses station  Outcome: Progressing  Goal: Maintain or return to baseline ADL function  Description: INTERVENTIONS:  -  Assess patient's ability to carry out ADLs; assess patient's baseline for ADL function and identify physical deficits which impact ability to perform ADLs (bathing, care of mouth/teeth, toileting, grooming, dressing, etc.)  - Assess/evaluate cause of self-care deficits   - Assess range of motion  - Assess patient's mobility; develop plan if impaired  - Assess patient's need for assistive devices and provide as appropriate  - Encourage maximum independence but intervene and supervise when necessary  - Involve family in performance of ADLs  - Assess for home care needs following discharge   - Consider OT consult to assist with ADL evaluation and planning for discharge  - Provide patient education as appropriate  Outcome: Progressing  Goal: Maintains/Returns to pre admission functional level  Description: INTERVENTIONS:  - Perform AM-PAC 6 Click Basic Mobility/ Daily Activity assessment daily.  - Set and communicate daily mobility goal to care team and patient/family/caregiver.   - Collaborate with rehabilitation services on mobility goals if consulted  - Perform Range of Motion 4 times a day.  - Reposition patient every 2 hours.  - Dangle patient 3 times a day  - Stand patient 3 times a day  - Ambulate patient 3 times a day  - Out of bed to chair 3 times a day   - Out of bed for meals 3 times a day  - Out of bed for toileting  - Record patient progress and toleration of activity level   Outcome: Progressing     Problem: DISCHARGE PLANNING  Goal: Discharge to home or other facility with appropriate resources  Description: INTERVENTIONS:  - Identify barriers to discharge w/patient and  caregiver  - Arrange for needed discharge resources and transportation as appropriate  - Identify discharge learning needs (meds, wound care, etc.)  - Arrange for interpretive services to assist at discharge as needed  - Refer to Case Management Department for coordinating discharge planning if the patient needs post-hospital services based on physician/advanced practitioner order or complex needs related to functional status, cognitive ability, or social support system  Outcome: Progressing     Problem: Knowledge Deficit  Goal: Patient/family/caregiver demonstrates understanding of disease process, treatment plan, medications, and discharge instructions  Description: Complete learning assessment and assess knowledge base.  Interventions:  - Provide teaching at level of understanding  - Provide teaching via preferred learning methods  Outcome: Progressing

## 2025-03-16 NOTE — ASSESSMENT & PLAN NOTE
EKG with nonspecific ST-T changes and   Magnesium 2.0  Received a dose of calcium  Monitor on telemetry  Avoid QT prolonging agents  Improved to 490 this morning   Deep Sutures: 4-0 Monocryl

## 2025-03-16 NOTE — ASSESSMENT & PLAN NOTE
History of minimal-change disease, nephrotic syndrome thought to be secondary to mantle cell, he was on Rituxan and prednisone through . [facility]  Creatinine has improved to 1.0 this morning  Pt counseled to monitor for edema and weight gain  Will hold torsemide on discharge for now

## 2025-03-16 NOTE — ED NOTES
Pt provided allopurinol and both chemo medications by wife. As per Rosemarie wife to provide home dose at this time due to patient needing immediately.      Geraldine Celis RN  03/16/25 0241

## 2025-03-16 NOTE — DISCHARGE SUMMARY
Discharge Summary - Hospitalist   Name: Angel Escoto 69 y.o. male I MRN: [id number]  Unit/Bed#: [facility] 917-01 I Date of Admission: 3/15/2025   Date of Service: 3/16/2025 I Hospital Day: 0     Assessment & Plan  Syncope  Patient is a 69-year-old male with a past medical history of mantle cell lymphoma, CKD stage IV, minimal-change disease, nephrotic syndrome in the setting of mantle cell lymphoma, history of PE, hypertension who presented with 2 syncopal episodes yesterday.  Patient started clinical trial for mantle cell lymphoma 3/6/2025, he has been taking high-dose steroids for 7 days, it was making him anxious, he slept about 2 hours daily for the past 7 days.  Yesterday while standing up twice he felt really lightheaded initially at 12 pm, he accidentally urinated himself at that time, the lightheadedness lasted for about 2 to 3 minutes.  Then around 3 PM again while standing up developed severe lightheadedness  and he passed out for a few seconds  Suspect orthostatic hypotension, generalized fatigue from sleep deprivation for the past 7 days, last oral intake.  Denies chest pain palpitation shortness of breath  He has CKD stage IV, creatinine at baseline, potassium 3.2, magnesium 2.0, procalcitonin negative, troponin x 2 negative, COVID/flu/RSV negative, TSH slightly elevated, hemoglobin stable.  No focal neurologic deficit  Found to have prolonged QT at 589  CTA head and neck showed no evidence of acute pathology    Improved with IV fluids, denies any dizziness or lightheadedness.  No events on telemetry  Qtc has improved to 490 this morning with electrolyte supplementation  Suspect his symptoms are due to high dose prednisone side effects, sleep deprivation and likely volume depletion from torsemide use.  Discharge home today, outpatient follow up with oncology  Leukocytosis  WBC 25 suspect secondary to filgrastim which he received 3/14/2025  He was also on prednisone 100 mg daily from 3/7/2025 to  3/13/2025  He is afebrile, procalcitonin negative  He denies any flulike symptoms recently, abdominal pain diarrhea or dysuria, no open wounds  Received cefepime and vancomycin, hold off further antibiotics and monitor  Follow-up blood cultures  No signs of acute infection at this time  Suspect his leukocytosis is due to g-CSF  Mantle cell lymphoma (HCC)  Follows [facility] heme-onc, [facility], currently in clinical trial  He started his clinical trial 3/6/2025  Received obinutuzumab on 3/7/2025 and 3/8/2025  He was on prednisone 100 mg daily from 3/7/2025 to 3/13/2025  Received a dose of filgrastim 3/14/2025  He is currently on ibrutinib 560 mg daily, taking it around 8 AM every morning, has to take it at the same time every day  On Revlimid 10 mg daily, also taking it at 8 AM every morning, has to take it at the same time every day  Ibrutinib and Revlimid sent to pharmacy to continue medications  On Bactrim Monday Wednesday Friday    Prolonged QT interval  EKG with nonspecific ST-T changes and   Magnesium 2.0  Received a dose of calcium  Monitor on telemetry  Avoid QT prolonging agents  Improved to 490 this morning  Hypothyroid  Home regimen is levothyroxine 100 mcg daily  TSH 9.59  free T4 is WNL  Recheck thyroid panel in 4 to 6 weeks as outpatient  Nephrotic syndrome  History of minimal-change disease, nephrotic syndrome thought to be secondary to mantle cell, he was on Rituxan and prednisone through [facility]  Creatinine has improved to 1.0 this morning  Pt counseled to monitor for edema and weight gain  Will hold torsemide on discharge for now  Anemia  Hemoglobin is slightly above his baseline, per wife he has been having labs through clinical trials and it has been increasing lately  CKD (chronic kidney disease) stage 4, GFR 15-29 ml/min ([facility])  Lab Results   Component Value Date    EGFR 69 03/16/2025    EGFR 38 03/15/2025    EGFR 50 (L) 02/10/2025    CREATININE 1.09 03/16/2025    CREATININE 1.76 (H) 03/15/2025     CREATININE 1.52 (H) 02/10/2025   Creatinine currently at baseline  Continue to monitor  History of pulmonary embolism  Diagnosed in December  On lifelong Eliquis  Hypertension  Continue losartan 50 mg daily, Procardia 30 mg daily and torsemide 40 mg daily  Monitor BP    Parotid nodule  Outpatient follow up with PCP/oncology for monitoring     Medical Problems       Resolved Problems  Date Reviewed: 3/15/2025   None       Discharging Physician / Practitioner: Onel Howard MD  PCP: Debra Crowe PA-C  Admission Date:   Admission Orders (From admission, onward)       Ordered        03/15/25 0851  Place in Observation  Once                          Discharge Date: 03/16/25    Consultations During Hospital Stay:  None    Procedures Performed:   None    Significant Findings / Test Results:   CT Brain:  No definite CT evidence of acute intracranial abnormality. An area of encephalomalacia within the inferomedial right frontal lobe appears similar to December 19, 2019. Clinical correlation is necessary. If further evaluation is indicated, MRI   could be performed, if there are no contraindications.     Paranasal sinus disease, as described above. Acute sinusitis should be excluded clinically.     CT Angiography: No evidence of large vessel occlusion or hemodynamically significant stenosis. No evidence of acute arterial dissection.     There is a 1 cm enhancing nodule within the right parotid gland. Follow-up recommended.    Incidental Findings:   Parotid nodule, as above   I reviewed the above mentioned incidental findings with the patient and/or family and they expressed understanding.    Test Results Pending at Discharge (will require follow up):   Blood cultures x2     Outpatient Tests Requested:  CT scan of face in 4-8 weeks    Complications:  None    Reason for Admission: syncope    Hospital Course:   Angel Escoto is a 69 y.o. male patient who originally presented to the hospital on 3/15/2025 due to a  syncopal event. He presented to the ED and was admitted for further evaluation. He was treated with IV fluids and electrolyte supplementation and his symptoms resolved. There no events on telemetry and he denied any infection symptoms and his evaluation was otherwise unremarkable except for side effects of his chemotherapy regimen. He was discharged home.      Please see above list of diagnoses and related plan for additional information.     Condition at Discharge: stable    Discharge Day Visit / Exam:   Subjective:  denies any nasal congesion, sore throat, nausea, vomiting, or diarrhea. Appetite has been decreased recently. Slept better tonight then previously.  Vitals: Blood Pressure: 140/78 (03/16/25 1050)  Pulse: 62 (03/16/25 1050)  Temperature: 98.5 °F (36.9 °C) (03/16/25 1050)  Temp Source: Oral (03/15/25 1534)  Respirations: 18 (03/16/25 0239)  Height: 6' (182.9 cm) (03/15/25 1534)  Weight - Scale: 89.4 kg (197 lb 1.5 oz) (03/16/25 0920)  SpO2: 95 % (03/16/25 1050)  Physical Exam  Constitutional:       Appearance: Normal appearance.   HENT:      Head: Normocephalic and atraumatic.      Nose: Nose normal.   Eyes:      Extraocular Movements: Extraocular movements intact.   Cardiovascular:      Rate and Rhythm: Normal rate and regular rhythm.   Pulmonary:      Effort: Pulmonary effort is normal.      Breath sounds: No wheezing or rales.   Musculoskeletal:      Right lower leg: No edema.      Left lower leg: No edema.   Neurological:      Mental Status: He is alert and oriented to person, place, and time.   Psychiatric:         Mood and Affect: Mood normal.         Behavior: Behavior normal.          Discussion with Family: Updated  (wife) at bedside.    Discharge instructions/Information to patient and family:   See after visit summary for information provided to patient and family.      Provisions for Follow-Up Care:  See after visit summary for information related to follow-up care and any  pertinent home health orders.      Mobility at time of Discharge:   Basic Mobility Inpatient Raw Score: 24  JH-HLM Goal: 8: Walk 250 feet or more  JH-HLM Achieved: 7: Walk 25 feet or more  HLM Goal NOT achieved. Continue to encourage mobility in post discharge setting.     Disposition:   Home    Planned Readmission: No    Discharge Medications:  See after visit summary for reconciled discharge medications provided to patient and/or family.      Administrative Statements   Discharge Statement:  I have spent a total time of 45 minutes in caring for this patient on the day of the visit/encounter. >30 minutes of time was spent on: Diagnostic results, Instructions for management, Patient and family education, Impressions, Counseling / Coordination of care, Documenting in the medical record, Reviewing / ordering tests, medicine, procedures  , and Communicating with other healthcare professionals .    **Please Note: This note may have been constructed using a voice recognition system**

## 2025-03-16 NOTE — ASSESSMENT & PLAN NOTE
Patient is a 69-year-old male with a past medical history of mantle cell lymphoma, CKD stage IV, minimal-change disease, nephrotic syndrome in the setting of mantle cell lymphoma, history of PE, hypertension who presented with 2 syncopal episodes yesterday.  Patient started clinical trial for mantle cell lymphoma 3/6/2025, he has been taking high-dose steroids for 7 days, it was making him anxious, he slept about 2 hours daily for the past 7 days.  Yesterday while standing up twice he felt really lightheaded initially at 12 pm, he accidentally urinated himself at that time, the lightheadedness lasted for about 2 to 3 minutes.  Then around 3 PM again while standing up developed severe lightheadedness  and he passed out for a few seconds  Suspect orthostatic hypotension, generalized fatigue from sleep deprivation for the past 7 days, last oral intake.  Denies chest pain palpitation shortness of breath  He has CKD stage IV, creatinine at baseline, potassium 3.2, magnesium 2.0, procalcitonin negative, troponin x 2 negative, COVID/flu/RSV negative, TSH slightly elevated, hemoglobin stable.  No focal neurologic deficit  Found to have prolonged QT at 589  CTA head and neck showed no evidence of acute pathology    Improved with IV fluids, denies any dizziness or lightheadedness.  No events on telemetry  Qtc has improved to 490 this morning with electrolyte supplementation  Suspect his symptoms are due to high dose prednisone side effects, sleep deprivation and likely volume depletion from torsemide use.  Discharge home today, outpatient follow up with oncology

## 2025-03-16 NOTE — ASSESSMENT & PLAN NOTE
[facility], [facility], currently in clinical trial  He started his clinical trial 3/6/2025  Received obinutuzumab on 3/7/2025 and 3/8/2025  He was on prednisone 100 mg daily from 3/7/2025 to 3/13/2025  Received a dose of filgrastim 3/14/2025  He is currently on ibrutinib 560 mg daily, taking it around 8 AM every morning, has to take it at the same time every day  On Revlimid 10 mg daily, also taking it at 8 AM every morning, has to take it at the same time every day  Ibrutinib and Revlimid sent to pharmacy to continue medications  On Bactrim Monday Wednesday Friday

## 2025-03-16 NOTE — INCIDENTAL FINDINGS
The following findings require follow up:  Radiographic finding   Finding: right parotid gland nodule   Follow up required: CT scan   Follow up should be done within 1-2 month(s)    Please notify the following clinician to assist with the follow up:   Primary care physician    Incidental finding results were discussed with the Patient by Onel Hoawrd MD on 03/16/25.   They expressed understanding and all questions answered.

## 2025-03-16 NOTE — ASSESSMENT & PLAN NOTE
Lab Results   Component Value Date    EGFR 69 03/16/2025    EGFR 38 03/15/2025    EGFR 50 (L) 02/10/2025    CREATININE 1.09 03/16/2025    CREATININE 1.76 (H) 03/15/2025    CREATININE 1.52 (H) 02/10/2025   Creatinine currently at baseline  Continue to monitor

## 2025-03-16 NOTE — ASSESSMENT & PLAN NOTE
Home regimen is levothyroxine 100 mcg daily  TSH 9.59  free T4 is WNL  Recheck thyroid panel in 4 to 6 weeks as outpatient

## 2025-03-16 NOTE — ASSESSMENT & PLAN NOTE
WBC 25 suspect secondary to filgrastim which he received 3/14/2025  He was also on prednisone 100 mg daily from 3/7/2025 to 3/13/2025  He is afebrile, procalcitonin negative  He denies any flulike symptoms recently, abdominal pain diarrhea or dysuria, no open wounds  Received cefepime and vancomycin, hold off further antibiotics and monitor  Follow-up blood cultures  No signs of acute infection at this time  Suspect his leukocytosis is due to g-CSF

## 2025-03-17 LAB
ATRIAL RATE: 62 BPM
P AXIS: 82 DEGREES
PR INTERVAL: 202 MS
QRS AXIS: 75 DEGREES
QRSD INTERVAL: 102 MS
QT INTERVAL: 482 MS
QTC INTERVAL: 490 MS
T WAVE AXIS: 82 DEGREES
VENTRICULAR RATE: 62 BPM

## 2025-03-17 PROCEDURE — 93010 ELECTROCARDIOGRAM REPORT: CPT | Performed by: INTERNAL MEDICINE

## 2025-03-17 NOTE — ED ATTENDING ATTESTATION
3/15/2025  I, Ialn Kendrick MD, saw and evaluated the patient. I have discussed the patient with the resident/non-physician practitioner and agree with the resident's/non-physician practitioner's findings, Plan of Care, and MDM as documented in the resident's/non-physician practitioner's note, except where noted. All available labs and Radiology studies were reviewed.  I was present for key portions of any procedure(s) performed by the resident/non-physician practitioner and I was immediately available to provide assistance.       At this point I agree with the current assessment done in the Emergency Department.  I have conducted an independent evaluation of this patient a history and physical is as follows:    ED Course   Impression: Syncope, dizziness, fever history of mantle cell lymphoma on active chemotherapy.  History of pulmonary embolism.  History of CKD    Differential diagnosis: At risk for sepsis, dehydration, electrolyte abnormality, ICH, CVA arrhythmia, long QT, l    Plan to perform broad workup including lab studies, lactate blood cultures urinalysis CTA head neck, chest x-ray IV fluids, pain control, anticipate admission        Critical Care Time  Procedures

## 2025-03-18 ENCOUNTER — TELEPHONE (OUTPATIENT)
Dept: OTHER | Facility: HOSPITAL | Age: 70
End: 2025-03-18

## 2025-03-18 DIAGNOSIS — R78.81 BACTEREMIA: Primary | ICD-10-CM

## 2025-03-18 LAB — BACTERIA BLD CULT: ABNORMAL

## 2025-03-18 NOTE — TELEPHONE ENCOUNTER
Received message from labs while on call that blood culture 1/2 positive for Micrococcus luteus while other set is negative at 72 hours. Suspect contaminant however given recent admission with leukocytosis in the setting of mantle cell lymphoma we will obtain repeat cultures to be followed up outpatient.  I discussed with patient who denies any fevers and reports he feels well.  Patient was recommended getting repeat blood cultures sets outpatient and to return to the ED if he has any fevers.  Patient agreeable with the plan.

## 2025-03-19 ENCOUNTER — APPOINTMENT (OUTPATIENT)
Dept: LAB | Facility: HOSPITAL | Age: 70
End: 2025-03-19
Payer: COMMERCIAL

## 2025-03-19 DIAGNOSIS — R78.81 BACTEREMIA: ICD-10-CM

## 2025-03-19 PROCEDURE — 36415 COLL VENOUS BLD VENIPUNCTURE: CPT

## 2025-03-19 PROCEDURE — 87040 BLOOD CULTURE FOR BACTERIA: CPT

## 2025-03-20 LAB — BACTERIA BLD CULT: NORMAL

## 2025-03-21 ENCOUNTER — APPOINTMENT (OUTPATIENT)
Dept: LAB | Facility: HOSPITAL | Age: 70
End: 2025-03-21
Payer: COMMERCIAL

## 2025-03-21 DIAGNOSIS — I51.9 MYXEDEMA HEART DISEASE: ICD-10-CM

## 2025-03-21 DIAGNOSIS — Z09 FOLLOW-UP EXAMINATION, FOLLOWING OTHER SURGERY: ICD-10-CM

## 2025-03-21 DIAGNOSIS — E87.6 HYPOPOTASSEMIA: ICD-10-CM

## 2025-03-21 DIAGNOSIS — E03.9 MYXEDEMA HEART DISEASE: ICD-10-CM

## 2025-03-21 LAB
BACTERIA UR QL AUTO: ABNORMAL /HPF
BILIRUB UR QL STRIP: NEGATIVE
CLARITY UR: CLEAR
COLOR UR: YELLOW
GLUCOSE UR STRIP-MCNC: NEGATIVE MG/DL
HGB UR QL STRIP.AUTO: ABNORMAL
KETONES UR STRIP-MCNC: NEGATIVE MG/DL
LEUKOCYTE ESTERASE UR QL STRIP: NEGATIVE
MAGNESIUM SERPL-MCNC: 2 MG/DL (ref 1.9–2.7)
NITRITE UR QL STRIP: NEGATIVE
NON-SQ EPI CELLS URNS QL MICRO: ABNORMAL /HPF
PH UR STRIP.AUTO: 7.5 [PH]
POTASSIUM SERPL-SCNC: 4.4 MMOL/L (ref 3.5–5.3)
PROT UR STRIP-MCNC: ABNORMAL MG/DL
RBC #/AREA URNS AUTO: ABNORMAL /HPF
SP GR UR STRIP.AUTO: 1.01 (ref 1–1.03)
T4 FREE SERPL-MCNC: 0.97 NG/DL (ref 0.61–1.12)
TSH SERPL DL<=0.05 MIU/L-ACNC: 19.53 UIU/ML (ref 0.45–4.5)
UROBILINOGEN UR STRIP-ACNC: <2 MG/DL
WBC #/AREA URNS AUTO: ABNORMAL /HPF

## 2025-03-21 PROCEDURE — 81001 URINALYSIS AUTO W/SCOPE: CPT

## 2025-03-21 PROCEDURE — 36415 COLL VENOUS BLD VENIPUNCTURE: CPT

## 2025-03-21 PROCEDURE — 84132 ASSAY OF SERUM POTASSIUM: CPT

## 2025-03-21 PROCEDURE — 87086 URINE CULTURE/COLONY COUNT: CPT

## 2025-03-21 PROCEDURE — 84439 ASSAY OF FREE THYROXINE: CPT

## 2025-03-21 PROCEDURE — 84443 ASSAY THYROID STIM HORMONE: CPT

## 2025-03-21 PROCEDURE — 83735 ASSAY OF MAGNESIUM: CPT

## 2025-03-22 LAB — BACTERIA UR CULT: NORMAL

## 2025-03-24 ENCOUNTER — RESULTS FOLLOW-UP (OUTPATIENT)
Dept: OTHER | Facility: HOSPITAL | Age: 70
End: 2025-03-24

## 2025-03-24 LAB
BACTERIA BLD CULT: NORMAL
BACTERIA BLD CULT: NORMAL

## 2025-04-12 DIAGNOSIS — K59.01 SLOW TRANSIT CONSTIPATION: ICD-10-CM

## 2025-04-14 RX ORDER — DOCUSATE SODIUM 100 MG/1
100 CAPSULE, LIQUID FILLED ORAL 2 TIMES DAILY
Qty: 60 CAPSULE | Refills: 0 | OUTPATIENT
Start: 2025-04-14